# Patient Record
Sex: FEMALE | Race: WHITE | NOT HISPANIC OR LATINO | Employment: UNEMPLOYED | URBAN - METROPOLITAN AREA
[De-identification: names, ages, dates, MRNs, and addresses within clinical notes are randomized per-mention and may not be internally consistent; named-entity substitution may affect disease eponyms.]

---

## 2018-09-13 ENCOUNTER — APPOINTMENT (EMERGENCY)
Dept: RADIOLOGY | Facility: HOSPITAL | Age: 53
End: 2018-09-13
Payer: COMMERCIAL

## 2018-09-13 ENCOUNTER — HOSPITAL ENCOUNTER (EMERGENCY)
Facility: HOSPITAL | Age: 53
Discharge: HOME | End: 2018-09-13
Attending: EMERGENCY MEDICINE | Admitting: EMERGENCY MEDICINE
Payer: COMMERCIAL

## 2018-09-13 VITALS
RESPIRATION RATE: 18 BRPM | WEIGHT: 178 LBS | SYSTOLIC BLOOD PRESSURE: 142 MMHG | DIASTOLIC BLOOD PRESSURE: 81 MMHG | HEIGHT: 61 IN | OXYGEN SATURATION: 100 % | TEMPERATURE: 98.6 F | BODY MASS INDEX: 33.61 KG/M2 | HEART RATE: 84 BPM

## 2018-09-13 DIAGNOSIS — M71.50 TRAUMATIC BURSITIS: Primary | ICD-10-CM

## 2018-09-13 PROCEDURE — 99283 EMERGENCY DEPT VISIT LOW MDM: CPT

## 2018-09-13 PROCEDURE — 96372 THER/PROPH/DIAG INJ SC/IM: CPT

## 2018-09-13 PROCEDURE — 73564 X-RAY EXAM KNEE 4 OR MORE: CPT

## 2018-09-13 RX ORDER — KETOROLAC TROMETHAMINE 30 MG/ML
30 INJECTION, SOLUTION INTRAMUSCULAR; INTRAVENOUS ONCE
Status: COMPLETED | OUTPATIENT
Start: 2018-09-13 | End: 2018-09-13

## 2018-09-13 RX ADMIN — KETOROLAC TROMETHAMINE 30 MG: 30 INJECTION, SOLUTION INTRAMUSCULAR at 12:55

## 2018-09-13 NOTE — DISCHARGE INSTRUCTIONS
Knee Bursitis   WHAT YOU NEED TO KNOW:   Knee bursitis is inflammation of the bursa in your knee  The bursa is a fluid-filled sac that acts as a cushion between a bone and a tendon  A tendon is a cord of strong tissue that connects muscles to bones  DISCHARGE INSTRUCTIONS:   Medicines:   · NSAIDs:  These medicines decrease swelling, pain, and fever  NSAIDs are available without a doctor's order  Ask your healthcare provider which medicine is right for you  Ask how much to take and when to take it  Take as directed  NSAIDs can cause stomach bleeding and kidney problems if not taken correctly  · Antibiotics: These help fight an infection caused by bacteria  You may need antibiotics if your bursitis is caused by infection  · Take your medicine as directed  Contact your healthcare provider if you think your medicine is not helping or if you have side effects  Tell him of her if you are allergic to any medicine  Keep a list of the medicines, vitamins, and herbs you take  Include the amounts, and when and why you take them  Bring the list or the pill bottles to follow-up visits  Carry your medicine list with you in case of an emergency  Manage your symptoms:   · Rest:  Rest your knee as much as possible to decrease pain and swelling  Slowly start to do more each day  Return to your daily activities as directed  · Ice:  Ice helps decrease swelling and pain  Ice may also help prevent tissue damage  Use an ice pack, or put crushed ice in a plastic bag  Cover it with a towel and place it on your knee for 15 to 20 minutes, 3 to 4 times each day, as directed  · Heat:  Heat helps decrease pain and stiffness  Apply heat on the area for 15 to 20 minutes, 3 to 4 times each day, as directed  · Compression:  Healthcare providers may wrap your knee with tape or an elastic bandage to decrease swelling  Loosen the elastic bandage if you start to lose feeling in your toes      · Elevation:  Raise your knee above the level of your heart as often as you can  This will help decrease swelling and pain  Prop your knee on pillows or blankets to keep it elevated comfortably  Physical therapy:  A physical therapist teaches you exercises to help improve movement and strength, and to decrease pain  Prevent another knee injury:   · Stretch, warm up, and cool down:  Always stretch and do warmup and cool-down exercises before and after you exercise  This will help loosen your muscles and decrease stress on your knees  Rest between workouts  · Protect your knees:  Use kneepads when you kneel on a hard surface and when you play sports  Stand and walk around every 20 minutes if you have to kneel for a long period of time  Follow up with your healthcare provider as directed:  Write down your questions so you remember to ask them during your visits  Contact your healthcare provider if:   · Your pain and swelling increase  · Your symptoms do not improve with treatment  · You have a fever  · You have questions or concerns about your condition or care  © 2017 [facility] Information is for End User's use only and may not be sold, redistributed or otherwise used for commercial purposes  All illustrations and images included in CareNotes® are the copyrighted property of A D A M , Inc  or Barry Hinojosa  The above information is an  only  It is not intended as medical advice for individual conditions or treatments  Talk to your doctor, nurse or pharmacist before following any medical regimen to see if it is safe and effective for you

## 2018-09-13 NOTE — ED PROVIDER NOTES
History  Chief Complaint   Patient presents with    Knee Injury     pt states she slipped on friday and injured her L knee  77-year-old female presents to the emergency department with complaints of left-sided knee pain  States that 6 days ago she was pushing her car to the side of the road when she slipped and fell onto the left knee  States she has had persistent pain in the front of the knee since that time which is worse with ambulation or when going up steps  Denies feeling of instability in this knee  Taking naproxen as needed for pain with minimal relief of symptoms  Patient states she has not had any previous injury to this knee  History provided by:  Patient   used: No        None       Past Medical History:   Diagnosis Date    Fibromyalgia     Medical history unknown        History reviewed  No pertinent surgical history  History reviewed  No pertinent family history  I have reviewed and agree with the history as documented  Social History   Substance Use Topics    Smoking status: Never Smoker    Smokeless tobacco: Never Used    Alcohol use No        Review of Systems   Constitutional: Negative for chills and fever  HENT: Negative for congestion, dental problem and sore throat  Respiratory: Negative for cough  Cardiovascular: Negative for chest pain  Gastrointestinal: Negative for abdominal pain  Musculoskeletal: Negative for back pain and neck pain  Knee pain     Skin: Negative for rash and wound  All other systems reviewed and are negative  Physical Exam  Physical Exam   Constitutional: She is oriented to person, place, and time  Vital signs are normal  She appears well-developed and well-nourished  HENT:   Head: Normocephalic and atraumatic  Cardiovascular: Normal rate and regular rhythm  Pulmonary/Chest: Effort normal and breath sounds normal  No respiratory distress  She has no wheezes  She has no rhonchi   She has no rales    Musculoskeletal:        Left knee: She exhibits swelling  She exhibits normal range of motion, no effusion, no ecchymosis, no deformity, no laceration, no erythema, normal alignment, no LCL laxity and normal patellar mobility  Tenderness (anteriorly) found  Neurological: She is alert and oriented to person, place, and time  Skin: Skin is warm and dry  Psychiatric: She has a normal mood and affect  Her behavior is normal    Nursing note and vitals reviewed  Vital Signs  ED Triage Vitals [09/13/18 1233]   Temperature Pulse Respirations Blood Pressure SpO2   98 6 °F (37 °C) 84 18 142/81 100 %      Temp Source Heart Rate Source Patient Position - Orthostatic VS BP Location FiO2 (%)   Oral Monitor Lying -- --      Pain Score       6           Vitals:    09/13/18 1233   BP: 142/81   Pulse: 84   Patient Position - Orthostatic VS: Lying       Visual Acuity      ED Medications  Medications   ketorolac (TORADOL) injection 30 mg (30 mg Intramuscular Given 9/13/18 1255)       Diagnostic Studies  Results Reviewed     None                 XR knee 4+ views left injury   ED Interpretation by Mason Tesfaye PA-C (09/13 1308)   Normal x-ray of the left knee                 Procedures  Procedures       Phone Contacts  ED Phone Contact    ED Course                               MDM  Number of Diagnoses or Management Options  Diagnosis management comments: Differential diagnosis includes but not limited to:  Contusion, traumatic bursitis, tendonitis  Doubt fracture         Amount and/or Complexity of Data Reviewed  Tests in the radiology section of CPT®: ordered      CritCare Time    Disposition  Final diagnoses:   Traumatic bursitis     Time reflects when diagnosis was documented in both MDM as applicable and the Disposition within this note     Time User Action Codes Description Comment    9/13/2018  1:10 PM Naeem Lucio Add [M71 50] Traumatic bursitis       ED Disposition     ED Disposition Condition Comment    Discharge  Kelton Felton discharge to home/self care  Condition at discharge: Stable        Follow-up Information     Follow up With Specialties Details Why [address] [place] [address] [facility] Schedule an appointment as soon as possible for a visit If symptoms worsen Mans[facility] [address] [place] [address] [id number] [phone]          Patient's Medications    No medications on file     No discharge procedures on file      ED Provider  Electronically Signed by           Sharon Agarwal PA-C  09/13/18 5708

## 2019-08-19 ENCOUNTER — TRANSCRIBE ORDERS (OUTPATIENT)
Dept: ADMINISTRATIVE | Facility: HOSPITAL | Age: 54
End: 2019-08-19

## 2019-08-19 DIAGNOSIS — Z12.39 BREAST SCREENING, UNSPECIFIED: Primary | ICD-10-CM

## 2019-08-19 DIAGNOSIS — Z13.820 SPECIAL SCREENING FOR OSTEOPOROSIS: ICD-10-CM

## 2019-09-24 ENCOUNTER — HOSPITAL ENCOUNTER (OUTPATIENT)
Dept: RADIOLOGY | Facility: MEDICAL CENTER | Age: 54
Discharge: HOME | End: 2019-09-24
Payer: COMMERCIAL

## 2019-09-24 VITALS — WEIGHT: 178 LBS | BODY MASS INDEX: 33.61 KG/M2 | HEIGHT: 61 IN

## 2019-09-24 DIAGNOSIS — Z13.820 SPECIAL SCREENING FOR OSTEOPOROSIS: ICD-10-CM

## 2019-09-24 DIAGNOSIS — Z12.39 BREAST SCREENING, UNSPECIFIED: ICD-10-CM

## 2019-09-24 PROCEDURE — 77080 DXA BONE DENSITY AXIAL: CPT

## 2019-09-24 PROCEDURE — 77067 SCR MAMMO BI INCL CAD: CPT

## 2019-10-04 ENCOUNTER — APPOINTMENT (OUTPATIENT)
Dept: RADIOLOGY | Facility: CLINIC | Age: 54
End: 2019-10-04
Payer: COMMERCIAL

## 2019-10-04 ENCOUNTER — OFFICE VISIT (OUTPATIENT)
Dept: URGENT CARE | Facility: CLINIC | Age: 54
End: 2019-10-04
Payer: COMMERCIAL

## 2019-10-04 VITALS
BODY MASS INDEX: 37.34 KG/M2 | HEART RATE: 69 BPM | DIASTOLIC BLOOD PRESSURE: 73 MMHG | HEIGHT: 61 IN | TEMPERATURE: 98.2 F | SYSTOLIC BLOOD PRESSURE: 173 MMHG | RESPIRATION RATE: 16 BRPM | WEIGHT: 197.8 LBS | OXYGEN SATURATION: 96 %

## 2019-10-04 DIAGNOSIS — G89.29 CHRONIC PAIN OF RIGHT ANKLE: Primary | ICD-10-CM

## 2019-10-04 DIAGNOSIS — M79.671 CHRONIC FOOT PAIN, RIGHT: ICD-10-CM

## 2019-10-04 DIAGNOSIS — G89.29 CHRONIC FOOT PAIN, RIGHT: ICD-10-CM

## 2019-10-04 DIAGNOSIS — M25.571 CHRONIC PAIN OF RIGHT ANKLE: Primary | ICD-10-CM

## 2019-10-04 PROCEDURE — 73630 X-RAY EXAM OF FOOT: CPT

## 2019-10-04 PROCEDURE — 99213 OFFICE O/P EST LOW 20 MIN: CPT | Performed by: PHYSICIAN ASSISTANT

## 2019-10-04 PROCEDURE — 73610 X-RAY EXAM OF ANKLE: CPT

## 2019-10-04 RX ORDER — IBUPROFEN 600 MG/1
600 TABLET ORAL EVERY 8 HOURS SCHEDULED
Qty: 30 TABLET | Refills: 0 | Status: SHIPPED | OUTPATIENT
Start: 2019-10-04 | End: 2020-04-22

## 2019-10-04 NOTE — PROGRESS NOTES
[facility]        NAME: Skinny Vidal is a 47 y o  female  : 1965    MRN: [id number]  DATE: 2019  TIME: 10:53 AM    Assessment and Plan   Chronic pain of right ankle [M25 571, G89 29]  1  Chronic pain of right ankle  XR ankle 3+ vw right    ibuprofen (MOTRIN) 600 mg tablet    Ambulatory referral to Physical Therapy    Ambulatory referral to Orthopedic Surgery   2  Chronic foot pain, right  XR foot 3+ vw right    ibuprofen (MOTRIN) 600 mg tablet    Ambulatory referral to Physical Therapy    Ambulatory referral to Orthopedic Surgery         Patient Instructions   X-ray of right foot and ankle performed in office-  Official radiology report pending at this time  If there are any positive findings our office will contact you  Alternate ice and heat therapy  Recommend a trial of scheduled high-dose anti inflammatory medications to decrease inflammation-prescription sent to the pharmacy for ibuprofen 600 mg  Wear supportive shoes  Consultation placed for Orthopedics and Physical therapy  Follow up with PCP in 3-5 days  Proceed to  ER if symptoms worsen  Chief Complaint     Chief Complaint   Patient presents with    Ankle Pain     Pt reports that she twisted her ankle approx a month ago and has only had it wrapped but pt reports its not any better         History of Present Illness   The patient is a 66-year-old female who presents with right foot and ankle pain that has been present for approximately 1 month  She cannot recall a specific event or injury  She states that she may have been walking and rolled her ankle   She denies any prior right ankle/foot injury or surgeries  She did follow up with her primary care physician and was told to apply an Ace wrap  Since then the pain has not improved and she now has increased pain in her foot  There is no swelling or bruising  Negative wound or abrasion    The pain is primarily located on the outer aspect of the ankle as well as the lateral aspect of the foot  She has been taking Motrin as needed for the pain but not scheduled  HPI    Review of Systems   Review of Systems   Constitutional: Negative for chills and fever  Musculoskeletal:        Right foot and ankle pain  Skin: Negative for color change, pallor, rash and wound  All other systems reviewed and are negative  Current Medications       Current Outpatient Medications:     ibuprofen (MOTRIN) 600 mg tablet, Take 1 tablet (600 mg total) by mouth every 8 (eight) hours, Disp: 30 tablet, Rfl: 0    Current Allergies     Allergies as of 10/04/2019    (No Known Allergies)            The following portions of the patient's history were reviewed and updated as appropriate: allergies, current medications, past family history, past medical history, past social history, past surgical history and problem list      Past Medical History:   Diagnosis Date    Fibromyalgia     Medical history unknown     Osteopenia        Past Surgical History:   Procedure Laterality Date    REDUCTION MAMMAPLASTY Bilateral        Family History   Problem Relation Age of Onset    Ovarian cancer Mother     Colon cancer Father     No Known Problems Maternal Grandmother     No Known Problems Maternal Grandfather     No Known Problems Paternal Grandmother     No Known Problems Paternal Grandfather     No Known Problems Son     Leukemia Maternal Uncle          Medications have been verified  Objective   BP (!) 173/73   Pulse 69   Temp 98 2 °F (36 8 °C)   Resp 16   Ht 5' 1" (1 549 m)   Wt 89 7 kg (197 lb 12 8 oz)   SpO2 96%   BMI 37 37 kg/m²        Physical Exam     Physical Exam   Constitutional: She appears well-developed and well-nourished  No distress  HENT:   Head: Normocephalic and atraumatic  Musculoskeletal:        Right ankle: She exhibits normal range of motion, no swelling, no ecchymosis, no deformity, no laceration and normal pulse  Tenderness   Lateral malleolus tenderness found  No medial malleolus, no AITFL, no CF ligament, no posterior TFL, no head of 5th metatarsal and no proximal fibula tenderness found  Achilles tendon normal  Achilles tendon exhibits no pain, no defect and normal Garcia's test results  Right foot: There is tenderness  There is normal range of motion, no bony tenderness, no swelling, normal capillary refill, no crepitus, no deformity and no laceration  Feet:    Skin: She is not diaphoretic  Nursing note and vitals reviewed

## 2019-10-04 NOTE — PATIENT INSTRUCTIONS
X-ray of right foot and ankle performed in office-  Official radiology report pending at this time  If there are any positive findings our office will contact you  Alternate ice and heat therapy  Recommend a trial of scheduled high-dose anti inflammatory medications to decrease inflammation-prescription sent to the pharmacy for ibuprofen 600 mg  Wear supportive shoes  Consultation placed for Orthopedics and Physical therapy  Follow up with PCP in 3-5 days  Proceed to  ER if symptoms worsen

## 2019-10-05 ENCOUNTER — DOCUMENTATION (OUTPATIENT)
Dept: URGENT CARE | Facility: CLINIC | Age: 54
End: 2019-10-05

## 2019-10-07 ENCOUNTER — OFFICE VISIT (OUTPATIENT)
Dept: PHYSICAL THERAPY | Facility: CLINIC | Age: 54
End: 2019-10-07
Payer: COMMERCIAL

## 2019-10-07 DIAGNOSIS — M25.571 PAIN IN JOINT, ANKLE AND FOOT, RIGHT: ICD-10-CM

## 2019-10-07 PROCEDURE — 97140 MANUAL THERAPY 1/> REGIONS: CPT | Performed by: PHYSICAL THERAPIST

## 2019-10-07 PROCEDURE — 97110 THERAPEUTIC EXERCISES: CPT | Performed by: PHYSICAL THERAPIST

## 2019-10-07 PROCEDURE — 97162 PT EVAL MOD COMPLEX 30 MIN: CPT | Performed by: PHYSICAL THERAPIST

## 2019-10-07 NOTE — PROGRESS NOTES
PT Evaluation     Today's date: 10/7/2019  Patient name: Donnie Frazier  : 1965  MRN: [id number]  Referring provider: Karishma Still PA-C  Dx:   Encounter Diagnosis     ICD-10-CM    1  Chronic pain of right ankle M25 571 Ambulatory referral to Physical Therapy    G89 29    2  Chronic foot pain, right M79 671 Ambulatory referral to Physical Therapy    G89 29                   Assessment  Assessment details: Donnie Frazier is a 47 y o  female was evaluated on 10/7/2019 under Direct Access for signs and symptoms consistent with Pain in joint, ankle and foot, right  Donnie Frazier has the above listed impairments and will benefit from skilled PT to improve deficits to return to prior level of function  Under direct access, the patient can be treated for 30 days without a prescription from the physician  Please do not hesitate to contact me at [phone]  Thank you for allowing me to participate in Donnie Frazier care  Impairments: abnormal coordination, abnormal gait, abnormal muscle firing, abnormal muscle tone, abnormal or restricted ROM, abnormal movement, activity intolerance, impaired balance, impaired physical strength, lacks appropriate home exercise program, pain with function and weight-bearing intolerance  Barriers to therapy: Shoes that the patient must wear during working hours      Understanding of Dx/Px/POC: good   Prognosis: good    Goals  Impairment Goals 4-6 weeks  - Decrease pain to 5/10  - Improve ankle AROM to equal to the unaffected lower extremity  - Increase ankle strength to 5/5 throughout  - Increase hip strength to 4/5 throughout    Functional Goals 6-8 weeks  - Return to Prior Level of Function  - Increase Functional Status Measure (FOTO) to: 72  - Patient will be independent with HEP  - Patient will be able to walk functionally >1 hour  - Patient will be able to perform sit to stand x10  - Patient will be able to ascend and descend stairs x10        Plan  Patient would benefit from: skilled PT  Referral necessary: No  Planned modality interventions: cryotherapy, electrical stimulation/Russian stimulation, H-Wave, TENS, thermotherapy: hydrocollator packs and unattended electrical stimulation  Planned therapy interventions: abdominal trunk stabilization, activity modification, ADL retraining, balance/weight bearing training, breathing training, body mechanics training, coordination, flexibility, functional ROM exercises, graded exercise, home exercise program, work reintegration, therapeutic training, therapeutic exercise, therapeutic activities, stretching, strengthening, self care, postural training, patient education, neuromuscular re-education, muscle pump exercises, motor coordination training, Longo taping, manual therapy, joint mobilization, IADL retraining, balance and gait training  Frequency: 2x week  Duration in visits: 16  Duration in weeks: 8  Treatment plan discussed with: patient, PTA and referring physician        Subjective Evaluation    Pain  Current pain ratin  At best pain ratin  At worst pain ratin  Location: Right lateral foot pain  Patient Goals  Patient goal: She wants to be able to walk  WORK:  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  Her work is not heavy, she has to  the lids, load them in a box, and then push the box onto the conveyer  She is on her feet for 8 hours with 2 30 min breaks  Wears steel toed shoes  She stands on a foam mat  She works [employer]  HOME LIFE: Lives alone with her dog  She reports that he is a big dog  She reports that she does not have to take him for walks, because he would not be able to make it  She has to mow, and take care of all the house stuff by herself  HOBBIES/EXERCISE: She likes to walk 2 miles a day  Has not been able to do this lately      PAIN LOCATION/DESCRIPTORS:  Patient reports her pain is on the side of her foot   Pain is achy, sharp, intermittent, varies, deep  AGGRAVATING FACTORS:  Sleeping in bed is painful  Heel rise, down stairs, walking, standing in one spot  EASES: meds  LATENCY:  30-45 mins  DAY PATTERN:  Usually pain is worst at night  Or first think in the AM     IMAGING:  X-ray: negative  PLOF:  100%  HISTORY OF CURRENT INJURY:  Patient reports that the pain started 2 weeks ago  Patient reports that she noticed the pain really ramped up last Friday  She does not reports that she can recall a mechanism of injury  She reports that she just woke one morning and can't remember what might have happened  She reports that she was doing yard work the day before  She thinks maybe she might have twisted it and just couldn't remember  She reports that some days it is better than others  Objective     Observations     Right Ankle/Foot   Positive for edema  Active Range of Motion   Left Ankle/Foot   Dorsiflexion (ke): 10 degrees   Plantar flexion: 40 degrees   Inversion: 24 degrees   Eversion: 6 degrees     Right Ankle/Foot   Dorsiflexion (ke): 5 degrees with pain  Plantar flexion: 40 degrees with pain  Inversion: 32 degrees with pain  Eversion: 2 degrees with pain    Joint Play     Right Ankle/Foot  Joints within functional limits are the midfoot and forefoot  Hypomobile in the talocrural joint  Tests     Right Ankle/Foot   Negative for anterior drawer, eversion talar tilt, impingement sign, posterior drawer and syndesmosis external rotation  Additional Tests Details  TTP to 3rd met head dosally, and 5th met head laterally 3/4        Swelling   Left Ankle/Foot   Figure 8: 52 cm    Right Ankle/Foot   Figure 8: 54 cm            Diagnosis: Right ankle/foot pain   Precautions: n/a   Manual Therapy 10/7/19       STM ankle/   lateral foot HJS, PT                                       Exercise Diary         Ankle pumps full range 20x       Kneeling DF stretch [id number]       Standing HR Wobble board balance        Gait mechanics- not walking on the outside of her foot                                                                                                                Taping Star tape               Modalities

## 2019-10-10 ENCOUNTER — OFFICE VISIT (OUTPATIENT)
Dept: PHYSICAL THERAPY | Facility: CLINIC | Age: 54
End: 2019-10-10
Payer: COMMERCIAL

## 2019-10-10 DIAGNOSIS — M25.571 PAIN IN JOINT, ANKLE AND FOOT, RIGHT: Primary | ICD-10-CM

## 2019-10-10 PROCEDURE — 97110 THERAPEUTIC EXERCISES: CPT | Performed by: PHYSICAL THERAPIST

## 2019-10-10 PROCEDURE — 97140 MANUAL THERAPY 1/> REGIONS: CPT | Performed by: PHYSICAL THERAPIST

## 2019-10-10 PROCEDURE — 97112 NEUROMUSCULAR REEDUCATION: CPT | Performed by: PHYSICAL THERAPIST

## 2019-10-10 NOTE — PROGRESS NOTES
Daily Note     Today's date: 10/10/2019  Patient name: Veronica Luna  : 1965  MRN: [id number]  Referring provider: Lucien Hernandez PA-C  Dx:   Encounter Diagnosis     ICD-10-CM    1  Pain in joint, ankle and foot, right M25 571                 Patient showed up without an appointment this visit and was still accommodated  Subjective: Patient states that her foot continues to be very painful  She states that she has noticed her shoes that she has to wear at work seem to be really bothering it  Objective: See treatment diary below      Assessment: Tolerated treatment well  Patient exhibited good technique with therapeutic exercises  Patient was able to perform all exercises noted today without increased pain  She is improving slowly toward long term goals  Advised in modified shoe wear  Taped bottom of foot  Patient presents with signs and symptoms consistent with possible gar's neuroma  Rule out as able  Plan: Continue per plan of care  Diagnosis: Right ankle/foot pain   Precautions: n/a   Manual Therapy 10/7/19 10/10/19      STM ankle/   lateral foot HJS, PT HJS, PT                                      Exercise Diary         Ankle pumps full range 20x 20x      Kneeling DF stretch [id number]      Standing HR  20x      Wobble board balance front to back only  2 mins       Gait mechanics- not walking on the outside of her foot  HJS, PT      BAPs                                                                                                 Pt education  Discussed shoe wear/modifications Discuss cushion       Taping Star tape Star tape over 3-4th met heads              Modalities

## 2019-10-21 ENCOUNTER — APPOINTMENT (OUTPATIENT)
Dept: PHYSICAL THERAPY | Facility: CLINIC | Age: 54
End: 2019-10-21
Payer: COMMERCIAL

## 2019-10-24 ENCOUNTER — APPOINTMENT (OUTPATIENT)
Dept: PHYSICAL THERAPY | Facility: CLINIC | Age: 54
End: 2019-10-24
Payer: COMMERCIAL

## 2019-10-28 ENCOUNTER — APPOINTMENT (OUTPATIENT)
Dept: PHYSICAL THERAPY | Facility: CLINIC | Age: 54
End: 2019-10-28
Payer: COMMERCIAL

## 2019-10-31 ENCOUNTER — APPOINTMENT (OUTPATIENT)
Dept: PHYSICAL THERAPY | Facility: CLINIC | Age: 54
End: 2019-10-31
Payer: COMMERCIAL

## 2020-04-20 ENCOUNTER — TELEPHONE (OUTPATIENT)
Dept: FAMILY MEDICINE CLINIC | Facility: CLINIC | Age: 55
End: 2020-04-20

## 2020-04-22 ENCOUNTER — DOCUMENTATION (OUTPATIENT)
Dept: URGENT CARE | Facility: CLINIC | Age: 55
End: 2020-04-22

## 2020-04-22 ENCOUNTER — TELEMEDICINE (OUTPATIENT)
Dept: FAMILY MEDICINE CLINIC | Facility: CLINIC | Age: 55
End: 2020-04-22
Payer: COMMERCIAL

## 2020-04-22 DIAGNOSIS — Z20.828 EXPOSURE TO SARS-ASSOCIATED CORONAVIRUS: ICD-10-CM

## 2020-04-22 DIAGNOSIS — Z20.828 EXPOSURE TO SARS-ASSOCIATED CORONAVIRUS: Primary | ICD-10-CM

## 2020-04-22 PROCEDURE — 99213 OFFICE O/P EST LOW 20 MIN: CPT | Performed by: NURSE PRACTITIONER

## 2020-04-22 PROCEDURE — 87635 SARS-COV-2 COVID-19 AMP PRB: CPT

## 2020-04-22 RX ORDER — ERGOCALCIFEROL 1.25 MG/1
CAPSULE ORAL
COMMUNITY
Start: 2020-03-26 | End: 2021-01-19 | Stop reason: SDUPTHER

## 2020-04-22 RX ORDER — HYDROXYZINE HYDROCHLORIDE 25 MG/1
25 TABLET, FILM COATED ORAL 3 TIMES DAILY
COMMUNITY
Start: 2020-03-31 | End: 2020-04-22

## 2020-04-22 RX ORDER — TRAMADOL HYDROCHLORIDE 50 MG/1
50 TABLET ORAL 2 TIMES DAILY
COMMUNITY
Start: 2020-04-13 | End: 2020-09-07

## 2020-04-23 ENCOUNTER — TELEMEDICINE (OUTPATIENT)
Dept: FAMILY MEDICINE CLINIC | Facility: CLINIC | Age: 55
End: 2020-04-23
Payer: COMMERCIAL

## 2020-04-23 VITALS — BODY MASS INDEX: 37.19 KG/M2 | WEIGHT: 197 LBS | HEIGHT: 61 IN

## 2020-04-23 DIAGNOSIS — Z20.828 EXPOSURE TO SARS-ASSOCIATED CORONAVIRUS: Primary | ICD-10-CM

## 2020-04-23 PROCEDURE — 99213 OFFICE O/P EST LOW 20 MIN: CPT | Performed by: FAMILY MEDICINE

## 2020-04-23 RX ORDER — AZITHROMYCIN 250 MG/1
TABLET, FILM COATED ORAL
Qty: 6 TABLET | Refills: 0 | Status: SHIPPED | OUTPATIENT
Start: 2020-04-23 | End: 2020-04-28 | Stop reason: ALTCHOICE

## 2020-04-23 RX ORDER — FLUTICASONE PROPIONATE 50 MCG
2 SPRAY, SUSPENSION (ML) NASAL DAILY
Qty: 1 BOTTLE | Refills: 1 | Status: SHIPPED | OUTPATIENT
Start: 2020-04-23 | End: 2020-05-18

## 2020-04-24 ENCOUNTER — TELEPHONE (OUTPATIENT)
Dept: FAMILY MEDICINE CLINIC | Facility: CLINIC | Age: 55
End: 2020-04-24

## 2020-04-24 LAB — SARS-COV-2 RNA SPEC QL NAA+PROBE: DETECTED

## 2020-04-28 ENCOUNTER — TELEMEDICINE (OUTPATIENT)
Dept: FAMILY MEDICINE CLINIC | Facility: CLINIC | Age: 55
End: 2020-04-28
Payer: COMMERCIAL

## 2020-04-28 VITALS — WEIGHT: 197 LBS | BODY MASS INDEX: 37.19 KG/M2 | HEIGHT: 61 IN

## 2020-04-28 DIAGNOSIS — U07.1 COVID-19 VIRUS INFECTION: Primary | ICD-10-CM

## 2020-04-28 PROCEDURE — 99213 OFFICE O/P EST LOW 20 MIN: CPT | Performed by: FAMILY MEDICINE

## 2020-04-28 RX ORDER — HYDROXYZINE HYDROCHLORIDE 25 MG/1
TABLET, FILM COATED ORAL
COMMUNITY
Start: 2020-04-26 | End: 2021-01-18 | Stop reason: ALTCHOICE

## 2020-04-28 RX ORDER — BUDESONIDE AND FORMOTEROL FUMARATE DIHYDRATE 160; 4.5 UG/1; UG/1
2 AEROSOL RESPIRATORY (INHALATION) 2 TIMES DAILY
Qty: 1 INHALER | Refills: 0 | Status: SHIPPED | OUTPATIENT
Start: 2020-04-28 | End: 2020-05-20

## 2020-04-28 RX ORDER — CHOLECALCIFEROL (VITAMIN D3) 125 MCG
1 CAPSULE ORAL DAILY
Qty: 90 CAPSULE | Refills: 1 | Status: SHIPPED | OUTPATIENT
Start: 2020-04-28 | End: 2020-10-16

## 2020-04-29 ENCOUNTER — TELEMEDICINE (OUTPATIENT)
Dept: FAMILY MEDICINE CLINIC | Facility: CLINIC | Age: 55
End: 2020-04-29
Payer: COMMERCIAL

## 2020-04-29 VITALS — HEIGHT: 61 IN | WEIGHT: 197 LBS | BODY MASS INDEX: 37.19 KG/M2

## 2020-04-29 DIAGNOSIS — U07.1 COVID-19 VIRUS INFECTION: Primary | ICD-10-CM

## 2020-04-29 PROBLEM — K63.5 COLON POLYP: Status: ACTIVE | Noted: 2020-04-29

## 2020-04-29 PROBLEM — Z20.828 EXPOSURE TO SARS-ASSOCIATED CORONAVIRUS: Status: RESOLVED | Noted: 2020-04-22 | Resolved: 2020-04-29

## 2020-04-29 PROBLEM — K64.8 INTERNAL HEMORRHOIDS: Status: ACTIVE | Noted: 2020-04-29

## 2020-04-29 PROBLEM — Z80.0 FAMILY HISTORY OF COLORECTAL CANCER: Status: ACTIVE | Noted: 2020-04-29

## 2020-04-29 PROCEDURE — 99213 OFFICE O/P EST LOW 20 MIN: CPT | Performed by: FAMILY MEDICINE

## 2020-04-30 ENCOUNTER — TELEMEDICINE (OUTPATIENT)
Dept: FAMILY MEDICINE CLINIC | Facility: CLINIC | Age: 55
End: 2020-04-30
Payer: COMMERCIAL

## 2020-04-30 VITALS — BODY MASS INDEX: 37.19 KG/M2 | WEIGHT: 197 LBS | HEIGHT: 61 IN

## 2020-04-30 DIAGNOSIS — U07.1 COVID-19 VIRUS INFECTION: Primary | ICD-10-CM

## 2020-04-30 PROCEDURE — 99213 OFFICE O/P EST LOW 20 MIN: CPT | Performed by: FAMILY MEDICINE

## 2020-05-08 ENCOUNTER — TELEMEDICINE (OUTPATIENT)
Dept: FAMILY MEDICINE CLINIC | Facility: CLINIC | Age: 55
End: 2020-05-08
Payer: COMMERCIAL

## 2020-05-08 VITALS — WEIGHT: 197 LBS | BODY MASS INDEX: 37.19 KG/M2 | HEIGHT: 61 IN

## 2020-05-08 DIAGNOSIS — U07.1 COVID-19 VIRUS INFECTION: Primary | ICD-10-CM

## 2020-05-08 PROCEDURE — 99213 OFFICE O/P EST LOW 20 MIN: CPT | Performed by: FAMILY MEDICINE

## 2020-05-08 PROCEDURE — 3008F BODY MASS INDEX DOCD: CPT | Performed by: FAMILY MEDICINE

## 2020-05-15 DIAGNOSIS — Z20.828 EXPOSURE TO SARS-ASSOCIATED CORONAVIRUS: ICD-10-CM

## 2020-05-18 RX ORDER — FLUTICASONE PROPIONATE 50 MCG
SPRAY, SUSPENSION (ML) NASAL
Qty: 16 ML | Refills: 1 | Status: SHIPPED | OUTPATIENT
Start: 2020-05-18 | End: 2020-06-11

## 2020-05-20 DIAGNOSIS — U07.1 COVID-19 VIRUS INFECTION: ICD-10-CM

## 2020-05-20 RX ORDER — BUDESONIDE AND FORMOTEROL FUMARATE DIHYDRATE 160; 4.5 UG/1; UG/1
AEROSOL RESPIRATORY (INHALATION)
Qty: 10.2 INHALER | Refills: 0 | Status: SHIPPED | OUTPATIENT
Start: 2020-05-20 | End: 2021-01-18 | Stop reason: ALTCHOICE

## 2020-06-10 DIAGNOSIS — Z20.828 EXPOSURE TO SARS-ASSOCIATED CORONAVIRUS: ICD-10-CM

## 2020-06-11 RX ORDER — FLUTICASONE PROPIONATE 50 MCG
SPRAY, SUSPENSION (ML) NASAL
Qty: 16 ML | Refills: 1 | Status: SHIPPED | OUTPATIENT
Start: 2020-06-11 | End: 2020-10-19 | Stop reason: ALTCHOICE

## 2020-09-06 DIAGNOSIS — M79.7 FIBROMYALGIA: ICD-10-CM

## 2020-09-07 RX ORDER — TRAMADOL HYDROCHLORIDE 50 MG/1
TABLET ORAL
Qty: 45 TABLET | Refills: 2 | Status: SHIPPED | OUTPATIENT
Start: 2020-09-07 | End: 2021-01-18 | Stop reason: ALTCHOICE

## 2020-10-16 DIAGNOSIS — U07.1 COVID-19 VIRUS INFECTION: ICD-10-CM

## 2020-10-16 RX ORDER — CHOLECALCIFEROL (VITAMIN D3) 125 MCG
CAPSULE ORAL
Qty: 90 CAPSULE | Refills: 0 | Status: SHIPPED | OUTPATIENT
Start: 2020-10-16 | End: 2020-10-19 | Stop reason: ALTCHOICE

## 2020-11-19 ENCOUNTER — TELEPHONE (OUTPATIENT)
Dept: FAMILY MEDICINE CLINIC | Facility: CLINIC | Age: 55
End: 2020-11-19

## 2020-11-19 DIAGNOSIS — F33.1 MODERATE EPISODE OF RECURRENT MAJOR DEPRESSIVE DISORDER (HCC): ICD-10-CM

## 2020-11-19 DIAGNOSIS — Z11.4 SCREENING FOR HIV (HUMAN IMMUNODEFICIENCY VIRUS): ICD-10-CM

## 2020-11-19 DIAGNOSIS — E61.1 IRON DEFICIENCY: ICD-10-CM

## 2020-11-19 DIAGNOSIS — Z11.59 NEED FOR HEPATITIS C SCREENING TEST: ICD-10-CM

## 2020-11-19 DIAGNOSIS — E53.8 B12 DEFICIENCY: ICD-10-CM

## 2020-11-19 DIAGNOSIS — E55.9 VITAMIN D DEFICIENCY: ICD-10-CM

## 2020-11-19 DIAGNOSIS — M79.7 FIBROMYALGIA: Primary | ICD-10-CM

## 2020-11-19 RX ORDER — MILNACIPRAN HYDROCHLORIDE 50 MG/1
50 TABLET, FILM COATED ORAL 2 TIMES DAILY
Qty: 60 TABLET | Refills: 0 | Status: SHIPPED | OUTPATIENT
Start: 2020-11-19 | End: 2020-12-15

## 2020-11-19 RX ORDER — DOXEPIN HYDROCHLORIDE 75 MG/1
CAPSULE ORAL
Qty: 45 CAPSULE | Refills: 0 | Status: SHIPPED | OUTPATIENT
Start: 2020-11-19 | End: 2020-12-08

## 2020-11-19 RX ORDER — PREDNISONE 10 MG/1
TABLET ORAL
COMMUNITY
Start: 2020-10-22 | End: 2021-01-18 | Stop reason: ALTCHOICE

## 2020-12-08 DIAGNOSIS — M79.7 FIBROMYALGIA: ICD-10-CM

## 2020-12-08 DIAGNOSIS — F33.1 MODERATE EPISODE OF RECURRENT MAJOR DEPRESSIVE DISORDER (HCC): ICD-10-CM

## 2020-12-08 RX ORDER — DOXEPIN HYDROCHLORIDE 75 MG/1
CAPSULE ORAL
Qty: 45 CAPSULE | Refills: 0 | Status: SHIPPED | OUTPATIENT
Start: 2020-12-08 | End: 2021-01-06

## 2020-12-15 DIAGNOSIS — M79.7 FIBROMYALGIA: ICD-10-CM

## 2020-12-15 RX ORDER — MILNACIPRAN HYDROCHLORIDE 50 MG/1
TABLET, FILM COATED ORAL
Qty: 180 TABLET | Refills: 1 | Status: SHIPPED | OUTPATIENT
Start: 2020-12-15 | End: 2021-05-18 | Stop reason: ALTCHOICE

## 2021-01-06 DIAGNOSIS — F33.1 MODERATE EPISODE OF RECURRENT MAJOR DEPRESSIVE DISORDER (HCC): ICD-10-CM

## 2021-01-06 DIAGNOSIS — M79.7 FIBROMYALGIA: ICD-10-CM

## 2021-01-06 RX ORDER — DOXEPIN HYDROCHLORIDE 75 MG/1
CAPSULE ORAL
Qty: 90 CAPSULE | Refills: 1 | Status: SHIPPED | OUTPATIENT
Start: 2021-01-06 | End: 2021-03-22 | Stop reason: ALTCHOICE

## 2021-01-18 ENCOUNTER — LAB (OUTPATIENT)
Dept: LAB | Facility: CLINIC | Age: 56
End: 2021-01-18
Payer: COMMERCIAL

## 2021-01-18 DIAGNOSIS — Z11.59 NEED FOR HEPATITIS C SCREENING TEST: ICD-10-CM

## 2021-01-18 DIAGNOSIS — E53.8 B12 DEFICIENCY: ICD-10-CM

## 2021-01-18 DIAGNOSIS — E61.1 IRON DEFICIENCY: ICD-10-CM

## 2021-01-18 DIAGNOSIS — E55.9 VITAMIN D DEFICIENCY: ICD-10-CM

## 2021-01-18 DIAGNOSIS — Z11.4 SCREENING FOR HIV (HUMAN IMMUNODEFICIENCY VIRUS): ICD-10-CM

## 2021-01-18 DIAGNOSIS — M79.7 FIBROMYALGIA: ICD-10-CM

## 2021-01-18 LAB
25(OH)D3 SERPL-MCNC: 28.5 NG/ML (ref 30–100)
ALBUMIN SERPL BCP-MCNC: 3.5 G/DL (ref 3.5–5)
ALP SERPL-CCNC: 76 U/L (ref 46–116)
ALT SERPL W P-5'-P-CCNC: 20 U/L (ref 12–78)
ANION GAP SERPL CALCULATED.3IONS-SCNC: 8 MMOL/L (ref 4–13)
AST SERPL W P-5'-P-CCNC: 18 U/L (ref 5–45)
BASOPHILS # BLD AUTO: 0.04 THOUSANDS/ΜL (ref 0–0.1)
BASOPHILS NFR BLD AUTO: 1 % (ref 0–1)
BILIRUB SERPL-MCNC: 0.26 MG/DL (ref 0.2–1)
BUN SERPL-MCNC: 10 MG/DL (ref 5–25)
CALCIUM SERPL-MCNC: 8.6 MG/DL (ref 8.3–10.1)
CHLORIDE SERPL-SCNC: 103 MMOL/L (ref 100–108)
CO2 SERPL-SCNC: 28 MMOL/L (ref 21–32)
CREAT SERPL-MCNC: 0.71 MG/DL (ref 0.6–1.3)
EOSINOPHIL # BLD AUTO: 0.15 THOUSAND/ΜL (ref 0–0.61)
EOSINOPHIL NFR BLD AUTO: 3 % (ref 0–6)
ERYTHROCYTE [DISTWIDTH] IN BLOOD BY AUTOMATED COUNT: 11.2 % (ref 11.6–15.1)
FERRITIN SERPL-MCNC: 10 NG/ML (ref 8–388)
GFR SERPL CREATININE-BSD FRML MDRD: 96 ML/MIN/1.73SQ M
GLUCOSE P FAST SERPL-MCNC: 96 MG/DL (ref 65–99)
HCT VFR BLD AUTO: 39.3 % (ref 34.8–46.1)
HCV AB SER QL: NORMAL
HGB BLD-MCNC: 12.7 G/DL (ref 11.5–15.4)
IMM GRANULOCYTES # BLD AUTO: 0.01 THOUSAND/UL (ref 0–0.2)
IMM GRANULOCYTES NFR BLD AUTO: 0 % (ref 0–2)
LYMPHOCYTES # BLD AUTO: 1.35 THOUSANDS/ΜL (ref 0.6–4.47)
LYMPHOCYTES NFR BLD AUTO: 25 % (ref 14–44)
MCH RBC QN AUTO: 31 PG (ref 26.8–34.3)
MCHC RBC AUTO-ENTMCNC: 32.3 G/DL (ref 31.4–37.4)
MCV RBC AUTO: 96 FL (ref 82–98)
MONOCYTES # BLD AUTO: 0.5 THOUSAND/ΜL (ref 0.17–1.22)
MONOCYTES NFR BLD AUTO: 9 % (ref 4–12)
NEUTROPHILS # BLD AUTO: 3.43 THOUSANDS/ΜL (ref 1.85–7.62)
NEUTS SEG NFR BLD AUTO: 62 % (ref 43–75)
NRBC BLD AUTO-RTO: 0 /100 WBCS
PLATELET # BLD AUTO: 236 THOUSANDS/UL (ref 149–390)
PMV BLD AUTO: 9.9 FL (ref 8.9–12.7)
POTASSIUM SERPL-SCNC: 4.3 MMOL/L (ref 3.5–5.3)
PROT SERPL-MCNC: 7.4 G/DL (ref 6.4–8.2)
RBC # BLD AUTO: 4.1 MILLION/UL (ref 3.81–5.12)
SODIUM SERPL-SCNC: 139 MMOL/L (ref 136–145)
VIT B12 SERPL-MCNC: 316 PG/ML (ref 100–900)
WBC # BLD AUTO: 5.48 THOUSAND/UL (ref 4.31–10.16)

## 2021-01-18 PROCEDURE — 87389 HIV-1 AG W/HIV-1&-2 AB AG IA: CPT

## 2021-01-18 PROCEDURE — 82728 ASSAY OF FERRITIN: CPT

## 2021-01-18 PROCEDURE — 86803 HEPATITIS C AB TEST: CPT

## 2021-01-18 PROCEDURE — 36415 COLL VENOUS BLD VENIPUNCTURE: CPT

## 2021-01-18 PROCEDURE — 85025 COMPLETE CBC W/AUTO DIFF WBC: CPT

## 2021-01-18 PROCEDURE — 80053 COMPREHEN METABOLIC PANEL: CPT

## 2021-01-18 PROCEDURE — 82306 VITAMIN D 25 HYDROXY: CPT

## 2021-01-18 PROCEDURE — 82607 VITAMIN B-12: CPT

## 2021-01-19 ENCOUNTER — OFFICE VISIT (OUTPATIENT)
Dept: FAMILY MEDICINE CLINIC | Facility: CLINIC | Age: 56
End: 2021-01-19
Payer: COMMERCIAL

## 2021-01-19 VITALS
SYSTOLIC BLOOD PRESSURE: 122 MMHG | RESPIRATION RATE: 16 BRPM | BODY MASS INDEX: 36.44 KG/M2 | HEIGHT: 61 IN | WEIGHT: 193 LBS | DIASTOLIC BLOOD PRESSURE: 84 MMHG | HEART RATE: 86 BPM | OXYGEN SATURATION: 98 %

## 2021-01-19 DIAGNOSIS — E55.9 VITAMIN D DEFICIENCY: Primary | ICD-10-CM

## 2021-01-19 DIAGNOSIS — E61.1 IRON DEFICIENCY: ICD-10-CM

## 2021-01-19 DIAGNOSIS — Z12.31 ENCOUNTER FOR SCREENING MAMMOGRAM FOR BREAST CANCER: ICD-10-CM

## 2021-01-19 DIAGNOSIS — E53.8 B12 DEFICIENCY: ICD-10-CM

## 2021-01-19 DIAGNOSIS — M54.42 ACUTE LEFT-SIDED LOW BACK PAIN WITH LEFT-SIDED SCIATICA: ICD-10-CM

## 2021-01-19 DIAGNOSIS — Z00.00 WELL ADULT EXAM: ICD-10-CM

## 2021-01-19 DIAGNOSIS — Z23 NEED FOR VACCINATION: ICD-10-CM

## 2021-01-19 DIAGNOSIS — E66.01 CLASS 2 SEVERE OBESITY DUE TO EXCESS CALORIES WITH SERIOUS COMORBIDITY AND BODY MASS INDEX (BMI) OF 36.0 TO 36.9 IN ADULT (HCC): ICD-10-CM

## 2021-01-19 LAB — HIV 1+2 AB+HIV1 P24 AG SERPL QL IA: NORMAL

## 2021-01-19 PROCEDURE — 90471 IMMUNIZATION ADMIN: CPT | Performed by: FAMILY MEDICINE

## 2021-01-19 PROCEDURE — 3008F BODY MASS INDEX DOCD: CPT | Performed by: FAMILY MEDICINE

## 2021-01-19 PROCEDURE — 99396 PREV VISIT EST AGE 40-64: CPT | Performed by: FAMILY MEDICINE

## 2021-01-19 PROCEDURE — 3725F SCREEN DEPRESSION PERFORMED: CPT | Performed by: FAMILY MEDICINE

## 2021-01-19 PROCEDURE — 1036F TOBACCO NON-USER: CPT | Performed by: FAMILY MEDICINE

## 2021-01-19 PROCEDURE — 90682 RIV4 VACC RECOMBINANT DNA IM: CPT | Performed by: FAMILY MEDICINE

## 2021-01-19 RX ORDER — ERGOCALCIFEROL 1.25 MG/1
50000 CAPSULE ORAL WEEKLY
Qty: 12 CAPSULE | Refills: 0 | Status: SHIPPED | OUTPATIENT
Start: 2021-01-19 | End: 2021-04-09

## 2021-01-19 RX ORDER — CYANOCOBALAMIN (VITAMIN B-12) 500 MCG
1000 TABLET ORAL DAILY
Qty: 90 TABLET | Refills: 1 | Status: SHIPPED | OUTPATIENT
Start: 2021-01-19 | End: 2021-03-17

## 2021-01-19 RX ORDER — TIZANIDINE 2 MG/1
2 TABLET ORAL
Qty: 30 TABLET | Refills: 0 | Status: SHIPPED | OUTPATIENT
Start: 2021-01-19 | End: 2021-02-15

## 2021-01-19 NOTE — PROGRESS NOTES
Assessment/Plan:    1  Vitamin D deficiency  -     ergocalciferol (VITAMIN D2) 50,000 units; Take 1 capsule (50,000 Units total) by mouth once a week    2  Iron deficiency  -     Ambulatory referral to Hematology / Oncology; Future    3  Need for vaccination  -     influenza vaccine, quadrivalent, recombinant, PF, 0 5 mL, for patients 18 yr+ (FLUBLOK)    4  Well adult exam    5  Encounter for screening mammogram for breast cancer  -     Ambulatory referral to Gynecology; Future  -     Mammo screening bilateral w 3d & cad; Future; Expected date: 01/19/2021    6  B12 deficiency  -     vitamin B-12 (CYANOCOBALAMIN) 500 MCG TABS; Take 2 tablets (1,000 mcg total) by mouth daily    7  Acute left-sided low back pain with left-sided sciatica  -     XR spine lumbar minimum 4 views non injury; Future; Expected date: 01/19/2021  -     tiZANidine (ZANAFLEX) 2 mg tablet; Take 1 tablet (2 mg total) by mouth daily at bedtime    8  Class 2 severe obesity due to excess calories with serious comorbidity and body mass index (BMI) of 36 0 to 36 9 in adult ([facility] [address] )       Vit d 50K   b12 1000  Send to hematology for iron ifusion   Has had it in [place] past and felt much better with it   feritin 10   Also saella increase to 100 bid   And look at the [facility] for swimming     coivd is finsihed       Subjective:      Patient ID: Bhavin Smallwood is a 54 y o  female  HPI     Lab review   covid is finsihed   Fibro is eh savalla didn't work much   Did well with water ex  Doxepin at night for sleeping   No major wt chagnes     Left sided sciatca off and on for months but worsening over past couple weeks   No injury     The following portions of the patient's history were reviewed and updated as appropriate: allergies, current medications, past family history, past medical history, past social history, past surgical history and problem list     Review of Systems   Constitutional: Negative for fever and unexpected weight change     HENT: Negative for nosebleeds and trouble swallowing  Eyes: Negative for visual disturbance  Respiratory: Negative for chest tightness and shortness of breath  Cardiovascular: Negative for chest pain, palpitations and leg swelling  Gastrointestinal: Negative for abdominal pain, constipation, diarrhea and nausea  Endocrine: Negative for cold intolerance  Genitourinary: Negative for dysuria and urgency  Musculoskeletal: Positive for arthralgias, back pain and myalgias  Negative for joint swelling  Skin: Negative for rash  Neurological: Negative for tremors, seizures and syncope  Hematological: Does not bruise/bleed easily  Psychiatric/Behavioral: Negative for hallucinations and suicidal ideas           Objective:      /84 (BP Location: Left arm, Patient Position: Sitting, Cuff Size: Large)   Pulse 86   Resp 16   Ht 5' 1" (1 549 m)   Wt 87 5 kg (193 lb)   SpO2 98%   BMI 36 47 kg/m²     Lab on 01/18/2021   Component Date Value    Vit D, 25-Hydroxy 01/18/2021 28 5*    Ferritin 01/18/2021 10     Sodium 01/18/2021 139     Potassium 01/18/2021 4 3     Chloride 01/18/2021 103     CO2 01/18/2021 28     ANION GAP 01/18/2021 8     BUN 01/18/2021 10     Creatinine 01/18/2021 0 71     Glucose, Fasting 01/18/2021 96     Calcium 01/18/2021 8 6     AST 01/18/2021 18     ALT 01/18/2021 20     Alkaline Phosphatase 01/18/2021 76     Total Protein 01/18/2021 7 4     Albumin 01/18/2021 3 5     Total Bilirubin 01/18/2021 0 26     eGFR 01/18/2021 96     WBC 01/18/2021 5 48     RBC 01/18/2021 4 10     Hemoglobin 01/18/2021 12 7     Hematocrit 01/18/2021 39 3     MCV 01/18/2021 96     MCH 01/18/2021 31 0     MCHC 01/18/2021 32 3     RDW 01/18/2021 11 2*    MPV 01/18/2021 9 9     Platelets 42/62/1761 236     nRBC 01/18/2021 0     Neutrophils Relative 01/18/2021 62     Immat GRANS % 01/18/2021 0     Lymphocytes Relative 01/18/2021 25     Monocytes Relative 01/18/2021 9     Eosinophils Relative 01/18/2021 3     Basophils Relative 01/18/2021 1     Neutrophils Absolute 01/18/2021 3 43     Immature Grans Absolute 01/18/2021 0 01     Lymphocytes Absolute 01/18/2021 1 35     Monocytes Absolute 01/18/2021 0 50     Eosinophils Absolute 01/18/2021 0 15     Basophils Absolute 01/18/2021 0 04     Vitamin B-12 01/18/2021 316     Hepatitis C Ab 01/18/2021 Non-reactive           Physical Exam  Vitals signs and nursing note reviewed  Constitutional:       Appearance: She is well-developed  She is obese  HENT:      Head: Normocephalic and atraumatic  Neck:      Musculoskeletal: Normal range of motion and neck supple  Cardiovascular:      Rate and Rhythm: Normal rate and regular rhythm  Heart sounds: Normal heart sounds  No murmur  Pulmonary:      Effort: Pulmonary effort is normal       Breath sounds: Normal breath sounds  No wheezing or rales  Abdominal:      General: Bowel sounds are normal  There is no distension  Palpations: Abdomen is soft  Tenderness: There is no abdominal tenderness  Musculoskeletal: Normal range of motion  General: Tenderness present  Lymphadenopathy:      Cervical: No cervical adenopathy  Skin:     General: Skin is warm and dry  Capillary Refill: Capillary refill takes less than 2 seconds  Findings: No rash  Neurological:      Mental Status: She is alert and oriented to person, place, and time  Cranial Nerves: No cranial nerve deficit  Sensory: No sensory deficit  Motor: No abnormal muscle tone  Psychiatric:         Behavior: Behavior normal          Thought Content: Thought content normal          Judgment: Judgment normal          BMI Counseling: Body mass index is 36 47 kg/m²  The BMI is above normal  Nutrition recommendations include decreasing portion sizes, encouraging healthy choices of fruits and vegetables and limiting drinks that contain sugar   Exercise recommendations include moderate physical activity 150 minutes/week  No pharmacotherapy was ordered             Berhane Guo MD  [place] [address]

## 2021-01-21 ENCOUNTER — TELEPHONE (OUTPATIENT)
Dept: HEMATOLOGY ONCOLOGY | Facility: CLINIC | Age: 56
End: 2021-01-21

## 2021-01-21 NOTE — TELEPHONE ENCOUNTER
New Patient Encounter    New Patient Intake Form   Patient Details:  Patt Rubi  1965  [id number]    Background Information:  [address] starts by opening a telephone encounter and gathering the following information   Who is calling to schedule? If not self, relationship to patient? self   Referring Provider Julieta Woods   What is the diagnosis? fe def anemia   Is this diagnosis confirmed? Yes   When was the diagnosis? 2 years ago   Is there a confirmed diagnosis from a biopsy/tissue reviewed by pathology? Were outside slides requested? Is patient aware of diagnosis? Yes   Is there a personal history and what kind? Is there a family history and what kind? Reason for visit? History Of   Have you had any imaging or labs done? If so: when, where? yes  [facility]   Are records in EPIC? yes   If patient has a prior history of breast cancer were old records obtained? NA   Was the patient told to bring a disk? Does the patient smoke or Vape? no   If yes, how many packs or cartridges per day? Scheduling Information:   Preferred Dane: Cassandra Bowen     Are there any dates/time the patient cannot be seen? Miscellaneous: pt does not recall name of hematologist she saw at [facility], but she did have iron infusions in her office  She will try to remember who it was and request records be sent to our office   After completing the above information, please route to Financial Counselor and the appropriate Nurse Navigator for review

## 2021-01-25 ENCOUNTER — APPOINTMENT (OUTPATIENT)
Dept: RADIOLOGY | Facility: CLINIC | Age: 56
End: 2021-01-25
Payer: COMMERCIAL

## 2021-01-25 DIAGNOSIS — M54.42 ACUTE LEFT-SIDED LOW BACK PAIN WITH LEFT-SIDED SCIATICA: ICD-10-CM

## 2021-01-25 PROCEDURE — 72110 X-RAY EXAM L-2 SPINE 4/>VWS: CPT

## 2021-01-26 DIAGNOSIS — M54.42 ACUTE LEFT-SIDED LOW BACK PAIN WITH LEFT-SIDED SCIATICA: Primary | ICD-10-CM

## 2021-01-26 RX ORDER — TRAMADOL HYDROCHLORIDE 50 MG/1
50 TABLET ORAL EVERY 6 HOURS
Qty: 20 TABLET | Refills: 2 | Status: SHIPPED | OUTPATIENT
Start: 2021-01-26 | End: 2021-01-31

## 2021-01-29 ENCOUNTER — NURSE TRIAGE (OUTPATIENT)
Dept: PHYSICAL THERAPY | Facility: OTHER | Age: 56
End: 2021-01-29

## 2021-01-29 DIAGNOSIS — M54.50 ACUTE LEFT-SIDED LOW BACK PAIN, UNSPECIFIED WHETHER SCIATICA PRESENT: Primary | ICD-10-CM

## 2021-01-29 NOTE — TELEPHONE ENCOUNTER
Additional Information   Negative: Is this related to a work injury?  Negative: Is this related to an MVA?  Negative: Has the patient had unexplained weight loss?  Negative: Does the patient have a fever?  Negative: Is the patient experiencing blood in sputum?  Negative: Is the patient experiencing urine retention?  Negative: Is the patient experiencing acute drop foot or paralysis?  Negative: Has the patient experienced major trauma? (fall from height, high speed collision, direct blow to spine) and is also experiencing nausea, light-headedness, or loss of consciousness?  Negative: Is this a chronic condition? Background - Initial Assessment  Clinical complaint: left lower back pain which radiates down the left leg down to the foot  No numbness or tingling  No history of back or neck issues  States she also has bilateral upper back pain which is non radiating  No incident or injury noted  Date of onset: 1/15/2021  Frequency of pain: constant  Quality of pain: aching and throbbing at times    Protocols used: [facility] COMPREHENSIVE SPINE PROGRAM PROTOCOL    This RN did review in detail the Comprehensive Spine Program and what we can provide for their back/neck pain  Patient is agreeable to being triaged by this RN and would like to proceed with Physical Therapy  Referral was placed for Physical Therapy at the [facility] site  Patients information was sent to the  to make evaluation appointment  Patient made aware that the PT office  will be calling to schedule the appointment  Patient was provided with the phone number to the PT office  No further questions and/or concerns were voiced by the patient at this time  Patient states understanding of the referral that was placed  Referral Closed

## 2021-02-13 DIAGNOSIS — M54.42 ACUTE LEFT-SIDED LOW BACK PAIN WITH LEFT-SIDED SCIATICA: ICD-10-CM

## 2021-02-15 RX ORDER — TIZANIDINE 2 MG/1
2 TABLET ORAL
Qty: 30 TABLET | Refills: 0 | Status: SHIPPED | OUTPATIENT
Start: 2021-02-15 | End: 2021-03-10

## 2021-02-17 ENCOUNTER — EVALUATION (OUTPATIENT)
Dept: PHYSICAL THERAPY | Facility: CLINIC | Age: 56
End: 2021-02-17
Payer: COMMERCIAL

## 2021-02-17 VITALS — TEMPERATURE: 97.8 F | SYSTOLIC BLOOD PRESSURE: 147 MMHG | HEART RATE: 86 BPM | DIASTOLIC BLOOD PRESSURE: 94 MMHG

## 2021-02-17 DIAGNOSIS — M54.50 ACUTE LEFT-SIDED LOW BACK PAIN, UNSPECIFIED WHETHER SCIATICA PRESENT: Primary | ICD-10-CM

## 2021-02-17 PROCEDURE — 97161 PT EVAL LOW COMPLEX 20 MIN: CPT

## 2021-02-17 NOTE — PROGRESS NOTES
PT Evaluation     Today's date: 2021  Patient name: Masha Steele  : 1965  MRN: [id number]  Referring provider: Todd Zaldivar PT  Dx:   Encounter Diagnosis     ICD-10-CM    1  Acute left-sided low back pain, unspecified whether sciatica present  M54 5 Ambulatory referral to PT spine     PT plan of care cert/re-cert                  Assessment  Assessment details: Pt is a pleasant 54 y o  female who presents to [place] [address] PT with acute onset of L sided low back pain w/ radicular sx into B LE, no specific CHIQUIS  Today, she presents with decreased and painful thoracolumbar ROM and joint mobility, decreased B LE and core strength, poor postural awareness, and high self reports of pain    Functionally, she is limited in her ability to stand and ambulate, negotiate stairs, sleep through the night, perform age appropriate recreation and exercise, and perform normal home duties  She is motivated to improve  Pt will benefit from skilled PT to address the aforementioned deficits and limitations in an effort to maximize pain free functional mobility and overall quality of life  Progress as able with these goals in mind  Impairments: abnormal coordination, abnormal gait, abnormal muscle firing, abnormal muscle tone, abnormal or restricted ROM, abnormal movement, activity intolerance, impaired physical strength and pain with function  Understanding of Dx/Px/POC: good   Prognosis: good    Goals  Short term goals (3 weeks):  1) Pt will improve thoracolumbar mobility deficits by 25% pain free  2) Pt will improve B LE and core strength deficits by 1/3 grade MMT  3) Pt will improve pain at worse to <4/10  4) Pt will initiate and progress HEP w/ special emphasis on functional core control and thoracolumbar mobility  Long term goals (6 weeks)  1) Pt will improve FOTO to at least 60   2) Pt will sleep through the night in positioning of choosing 6/7 nights a week w/o waking due to pain    3) Pt will perform full week of self care and home activities w/ improved thoracolumbar mechanics and pain <3/10 throughout  4) Pt will be independent and compliant w/ HEP in order to maximize functional benefit of skilled PT following d/c          Plan  Plan details: HEP to start: see scanned doc    Patient would benefit from: skilled PT  Planned modality interventions: cryotherapy and thermotherapy: hydrocollator packs  Planned therapy interventions: abdominal trunk stabilization, activity modification, ADL retraining, manual therapy, massage, motor coordination training, neuromuscular re-education, patient education, therapeutic training, therapeutic exercise, therapeutic activities, stretching, strengthening, home exercise program, functional ROM exercises, gait training, balance, balance/weight bearing training and body mechanics training  Frequency: 2x week  Duration in visits: 12  Duration in weeks: 6  Treatment plan discussed with: patient        Subjective Evaluation    History of Present Illness  Mechanism of injury: Pt has had L side low back and posterior hip pain, radiating into B LE L>R for the last 3-4 weeks  Notes that just before this time she had dog who was ill, she was sleeping on the floor with him  Not sure if this contributed to pain as she notes no other specific CHIQUIS  Also gets pain in L side shoulder and back  Hard to get comfortable and sleep at night, has trouble w/ prolonged positioning and prolonged WB  Feels that she cannot stand up straight s/t pain  Main goal is pain relief  Hopeful to be able to walk and move without pain   Functional status below:   Quality of life: good    Pain  Current pain ratin  At best pain ratin  At worst pain rating: 10  Location: L side low back, L side hip, radiating down L LE> R LE   Relieving factors: heat and rest  Aggravating factors: stair climbing, standing, walking, overhead activity, lifting and running  Progression: worsening    Social Support  Steps to enter house: yes  Stairs in house: no   Lives in: [facility]  Lives with: alone    Employment status: not working (used to work for [employer] )  Patient Goals  Patient goals for therapy: increased strength, decreased pain, increased motion and return to sport/leisure activities  Patient goal: be able to walk, ride horses, move without pain! Objective     Concurrent Complaints  Negative for bladder dysfunction, bowel dysfunction and saddle (S4) numbness    Postural Observations  Seated posture: poor  Standing posture: poor  Correction of posture: makes symptoms better        Palpation     Additional Palpation Details  Pt is TTP and has increased tissue density through L side lumbar PS and QL/piri     Neurological Testing     Sensation     Lumbar   Left   Intact: light touch    Right   Intact: light touch    Reflexes   Left   Patellar (L4): normal (2+)  Achilles (S1): normal (2+)    Right   Patellar (L4): normal (2+)  Achilles (S1): normal (2+)    Active Range of Motion     Additional Active Range of Motion Details  Flex: 75% w/ bracing upon standing and mod pain, guarded   Ext: 50% w/ mod pain in L side low back  SB R: 50% w/ min L sided pain  SB L: 25% w/ mod L sided pain  Rot R: 25-50% w/ mod L sided pain  Rot L: 50-75% w/o significant pain    Guarded throughout       Strength/Myotome Testing     Additional Strength Details  B LE grossly 4-/5 w/ guarding and discomfort throughout    B knee flex/ext grossly 4/5     Muscle Activation   Patient unable to activate left transverse abdominals and right transverse abdominals       Additional Muscle Activation Details  Core Strength: 1/5 w/ cueing to maintain proper brace w/ 1+/5 march     Tests     Additional Tests Details  Manual lumbar traction: LAD on R provides relief to L, L side no relief w/ LAD    CORY provides relief w/ pillow under hips     Passive SLR to ~90 deg B w/o significant pain    Standing lumbar ext helps to relieve discomfort     L side lumbar UPAs painful, L side SI TTP, sacral mobs min antalgic (PA)    Ambulation     Ambulation: Level Surfaces     Additional Level Surfaces Ambulation Details  Forward flexed through hips and stiff through tspine and low back, poor WB on L LE, fatigues quickly, mod antalgic during clinical distances     Functional Assessment        Comments  Sit<>stand: UE support on LE w/ R sided WS and mod pain     BW squat: to 50% w/ R sided WS and mod pain    Stairs: TBA    SLS: unable on L LE s/t pain      Flowsheet Rows      Most Recent Value   PT/OT G-Codes   Current Score  39   Projected Score  57   FOTO information reviewed  Yes             Precautions: fibromyalgia, HTN       Date (Visit #) 2/17 (1) IE       Manual              DTM and TPR             Lumbar mobs  tested B            Lumbar traction  trialed B                                          Exercise Diary              Ther Ex        Active w/u             HS/glute/piri/HF stretching B HS and piri tested x30 sec           Mobility (LTR, open books, cat/cow) LTR x 10 B    CORY and PPU to elbow x3-4 mins       Rows/ext        Hip stability                                                Neuro Re Ed        TrA progressions   isos and march x 5-10 each            Bridge progressions  reg w/ TrA brace x 5            Squat progressions             Hip abd walking/monster walking             Balance         Standing and sitting lumbar ext w/ overpressure demo and practice x 7 mins        Review of HEP and POC x 5 mins                       Ther Act             Stairs             Functional Transfers             Functional Lifting                                                                                                                                                           Modalities             heat             Ice              Mechanical Traction

## 2021-02-19 ENCOUNTER — APPOINTMENT (OUTPATIENT)
Dept: PHYSICAL THERAPY | Facility: CLINIC | Age: 56
End: 2021-02-19
Payer: COMMERCIAL

## 2021-02-22 ENCOUNTER — CONSULT (OUTPATIENT)
Dept: HEMATOLOGY ONCOLOGY | Facility: CLINIC | Age: 56
End: 2021-02-22
Payer: COMMERCIAL

## 2021-02-22 VITALS
HEART RATE: 95 BPM | BODY MASS INDEX: 36.35 KG/M2 | TEMPERATURE: 98.3 F | WEIGHT: 192.5 LBS | RESPIRATION RATE: 17 BRPM | SYSTOLIC BLOOD PRESSURE: 140 MMHG | HEIGHT: 61 IN | DIASTOLIC BLOOD PRESSURE: 81 MMHG | OXYGEN SATURATION: 98 %

## 2021-02-22 DIAGNOSIS — Z98.84 H/O GASTRIC BYPASS: Primary | ICD-10-CM

## 2021-02-22 DIAGNOSIS — E53.8 B12 DEFICIENCY: ICD-10-CM

## 2021-02-22 DIAGNOSIS — E61.1 IRON DEFICIENCY: ICD-10-CM

## 2021-02-22 PROCEDURE — 99243 OFF/OP CNSLTJ NEW/EST LOW 30: CPT | Performed by: PHYSICIAN ASSISTANT

## 2021-02-22 RX ORDER — CYANOCOBALAMIN 1000 UG/ML
1000 INJECTION INTRAMUSCULAR; SUBCUTANEOUS ONCE
Status: CANCELLED | OUTPATIENT
Start: 2021-02-25

## 2021-02-22 RX ORDER — SODIUM CHLORIDE 9 MG/ML
20 INJECTION, SOLUTION INTRAVENOUS ONCE
Status: CANCELLED | OUTPATIENT
Start: 2021-02-25

## 2021-02-22 NOTE — PROGRESS NOTES
Oncology Outpatient Consult Note  Magali Sanford 54 y o  female MRN: @ Encounter: [id number]        Date:  2/22/2021      Assessment/ Plan:    1  Iron deficiency anemia and B12 deficiency secondary to decreased absorption  She status post gastric bypass surgery  She has received IV iron in the past with good tolerance  Potential side effects  of IV iron could include but may not be limited to:  change in taste, diarrhea, muscle cramps, nausea or vomiting, pain in the arms or legs, pain or burning sensation in the injection site, allergic reaction  The patient verbalized understanding and wishes to proceed  Will arrange for Venofer 300 milligrams x 6 doses along with B12 1000 micro grams IM x6 doses  We discussed possibility of a maintenance program       She is asked to follow-up in 6 months with repeat labs      Labs and imaging studies are reviewed by ordering provider once results are available  If there are findings that need immediate attention, you will be contacted when results available  Discussing results and the implication on your healthcare is best discussed in person at your follow-up visit  HPI:  Magali Sanford is a 54 y o  seen for initial consultation 2/22/2021 at the referral of Marisol Devine MD regarding iron deficiency  1/18/2021 hemoglobin 12 7, MCV of 96, white blood cell count 5 48,  62% neutrophils, 25% lymphocytes, 9% monocytes, platelets 580  CMP normal     Vitamin B12 316, ferritin of 10  Normal hepatitis C antibody and HIV antigens/ antibody    5/13/20:   Hemoglobin 13 6, MCV 91 6, white blood cell count 5 2, normal differential, platelets 423    She is status post gastric bypass surgery approximately 2006  She underwent upper endoscopy and colonoscopy 12/31/2019 by Dr Nathaly Tobias  The anastomosis was noted to look good  Internal hemorrhoids noted in 1 small polyp was removed    Repeat colonoscopy in 5 years was recommended,    She has received IV iron in the past at [facility] with good tolerance  Cannot recall when her last treatment was but it was many years ago  States she does not like going to doctors    She does have chronic back pain  Spends her free time wood working and being creative    She has been postmenopausal for many years  Menses had always been erratic  She has fatigue  Denies any headaches, dizziness, chest pain or shortness of breath  No palpitations  No GI or  issues  She denies any melena, hematochezia or other sources of blood loss  She does take B12 orally faithfully        Test Results:      Labs:   Lab Results   Component Value Date    HGB 12 7 01/18/2021    HCT 39 3 01/18/2021    MCV 96 01/18/2021     01/18/2021    WBC 5 48 01/18/2021    NRBC 0 01/18/2021     Lab Results   Component Value Date    K 4 3 01/18/2021     01/18/2021    CO2 28 01/18/2021    BUN 10 01/18/2021    CREATININE 0 71 01/18/2021    GLUF 96 01/18/2021    CALCIUM 8 6 01/18/2021    AST 18 01/18/2021    ALT 20 01/18/2021    ALKPHOS 76 01/18/2021    EGFR 96 01/18/2021           Imaging:   Xr Spine Lumbar Minimum 4 Views Non Injury    Result Date: 1/26/2021  Narrative: LUMBAR SPINE INDICATION:   M[age]: Lumbago with sciatica, left side  COMPARISON:  None VIEWS:  XR SPINE LUMBAR MINIMUM 4 VIEWS NON INJURY Images: 5 FINDINGS: There are 5 non rib bearing lumbar vertebral bodies  There is no evidence of acute fracture or destructive osseous lesion  Mild dextroconvex scoliosis  There are degenerative changes in the facet joints of the lower lumbar spine and in the disc spaces of the lower thoracic and upper lumbar spine  The pedicles appear intact  No pars defects  SI joints appear normal  There are nonspecific calcifications in the left flank area not clearly related to the left kidney  Impression: No acute osseous abnormality  Degenerative changes as described  Scoliosis   Nonspecific left flank calcification of uncertain etiology  Workstation performed: [id number]           ROS: As mentioned in HPI & Interval History otherwise 14 point ROS negative  Active Problems:   Patient Active Problem List   Diagnosis    COVID-19 virus infection    Colon polyp    Internal hemorrhoids    Family history of colorectal cancer    Vitamin D deficiency    Iron deficiency       Past Medical History:   Past Medical History:   Diagnosis Date    Fibromyalgia     Medical history unknown     Osteopenia        Surgical History:   Past Surgical History:   Procedure Laterality Date    CARPAL TUNNEL RELEASE Bilateral     21 y o  with left ulnar nerve release   CARPAL TUNNEL RELEASE Bilateral      SECTION      ELBOW SURGERY      Tennis elbow    GASTRIC BYPASS      REDUCTION MAMMAPLASTY Bilateral     WISDOM TOOTH EXTRACTION         Family History:    Family History   Problem Relation Age of Onset    Ovarian cancer Mother     Heart disease Mother     Colon cancer Father     No Known Problems Maternal Grandmother     No Known Problems Maternal Grandfather     Diabetes Paternal Grandmother     No Known Problems Paternal Grandfather     No Known Problems Son     Leukemia Maternal Uncle     Diabetes Paternal Aunt     Diabetes Paternal Uncle        Cancer-related family history includes Colon cancer in her father; Ovarian cancer in her mother  Social History:   Social History     Socioeconomic History    Marital status:       Spouse name: Not on file    Number of children: Not on file    Years of education: Not on file    Highest education level: Not on file   Occupational History    Not on file   Social Needs    Financial resource strain: Not on file    Food insecurity     Worry: Not on file     Inability: Not on file    Transportation needs     Medical: Not on file     Non-medical: Not on file   Tobacco Use    Smoking status: Never Smoker    Smokeless tobacco: Never Used   Substance and Sexual Activity    Alcohol use: No    Drug use: No    Sexual activity: Not on file   Lifestyle    Physical activity     Days per week: Not on file     Minutes per session: Not on file    Stress: Not on file   Relationships    Social connections     Talks on phone: Not on file     Gets together: Not on file     Attends Mandaen service: Not on file     Active member of club or organization: Not on file     Attends meetings of clubs or organizations: Not on file     Relationship status: Not on file    Intimate partner violence     Fear of current or ex partner: Not on file     Emotionally abused: Not on file     Physically abused: Not on file     Forced sexual activity: Not on file   Other Topics Concern    Not on file   Social History Narrative    Not on file       Current Medications:   Current Outpatient Medications   Medication Sig Dispense Refill    doxepin (SINEquan) 75 MG capsule TAKE 1 CAP AT BEDTIME FOR 1 WEEK THEN INCREASE TO 2 CAPSULES AFTER 1 WEEK 90 capsule 1    ergocalciferol (VITAMIN D2) 50,000 units Take 1 capsule (50,000 Units total) by mouth once a week 12 capsule 0    Savella 50 MG TABS TAKE 1 TABLET BY MOUTH TWICE A  tablet 1    tiZANidine (ZANAFLEX) 2 mg tablet TAKE 1 TABLET (2 MG TOTAL) BY MOUTH DAILY AT BEDTIME 30 tablet 0    vitamin B-12 (CYANOCOBALAMIN) 500 MCG TABS Take 2 tablets (1,000 mcg total) by mouth daily 90 tablet 1     No current facility-administered medications for this visit  Allergies: Allergies   Allergen Reactions    Calamine Other (See Comments)         Physical Exam:    There is no height or weight on file to calculate BSA     Ht Readings from Last 3 Encounters:   01/19/21 5' 1" (1 549 m)   05/08/20 5' 1" (1 549 m)   04/30/20 5' 1" (1 549 m)       Wt Readings from Last 3 Encounters:   01/19/21 87 5 kg (193 lb)   05/08/20 89 4 kg (197 lb)   04/30/20 89 4 kg (197 lb)        Temp Readings from Last 3 Encounters:   02/17/21 97 8 °F (36 6 °C)   10/04/19 98 2 °F (36 8 °C)   09/13/18 98 6 °F (37 °C) (Oral)        BP Readings from Last 3 Encounters:   02/17/21 147/94   01/19/21 122/84   10/04/19 (!) 173/73         Pulse Readings from Last 3 Encounters:   02/17/21 86   01/19/21 86   10/04/19 69         Physical Exam    Physical Exam  Vitals signs reviewed  Constitutional:       General: She is not in acute distress  Appearance: She is well-developed  She is not diaphoretic  HENT:      Head: Normocephalic and atraumatic  Eyes:      Conjunctiva/sclera: Conjunctivae normal    Neck:      Musculoskeletal: Normal range of motion and neck supple  Trachea: No tracheal deviation  Cardiovascular:      Rate and Rhythm: Normal rate and regular rhythm  Heart sounds: No murmur  No friction rub  No gallop  Pulmonary:      Effort: Pulmonary effort is normal  No respiratory distress  Breath sounds: Normal breath sounds  No wheezing or rales  Chest:      Chest wall: No tenderness  Abdominal:      General: There is no distension  Palpations: Abdomen is soft  Tenderness: There is no abdominal tenderness  Lymphadenopathy:      Cervical: No cervical adenopathy  Skin:     General: Skin is warm and dry  Coloration: Skin is not pale  Findings: No erythema  Neurological:      Mental Status: She is alert and oriented to person, place, and time  Psychiatric:         Behavior: Behavior normal          Thought Content:  Thought content normal          Judgment: Judgment normal              Emergency Contacts:    Contact,No, [phone],

## 2021-02-24 ENCOUNTER — OFFICE VISIT (OUTPATIENT)
Dept: PHYSICAL THERAPY | Facility: CLINIC | Age: 56
End: 2021-02-24
Payer: COMMERCIAL

## 2021-02-24 DIAGNOSIS — M54.50 ACUTE LEFT-SIDED LOW BACK PAIN, UNSPECIFIED WHETHER SCIATICA PRESENT: Primary | ICD-10-CM

## 2021-02-24 PROCEDURE — 97112 NEUROMUSCULAR REEDUCATION: CPT

## 2021-02-24 PROCEDURE — 97110 THERAPEUTIC EXERCISES: CPT

## 2021-02-24 NOTE — PROGRESS NOTES
Daily Note     Today's date: 2021  Patient name: Priscila López  : 1965  MRN: [id number]  Referring provider: Shellie Noe, PT  Dx:   Encounter Diagnosis     ICD-10-CM    1  Acute left-sided low back pain, unspecified whether sciatica present  M54 5                   Subjective: Pt reports lots of stress in personal life over last week, back has held up pretty well  Has been using different shoes and feels that this has been beneficial        Objective: Excellent ability to brace through core stabilizers w/ minimal cueing, holds contraction well despite fatigue  Assessment: Tolerated treatment well  Patient demonstrated fatigue post treatment and would benefit from continued PT  Excellent tolerance to initial progressions, shows good form and pacing and takes cueing well  Will benefit from progressive strengthening and exercise increases as sx allow  Focus on techniques that can be replicated at home  Plan: Continue per plan of care   sumo squats, stairs, hip abd work       Precautions: fibromyalgia, HTN       Date (Visit #)  (1) IE  (2)       Manual              DTM and TPR             Lumbar mobs  tested B            Lumbar traction  trialed B                                          Exercise Diary              Ther Ex        Active w/u    recumbent pre 6 min lvl 3          HS/glute/piri/HF stretching B HS and piri tested x30 sec HS and piri x3-4 min total          Mobility (LTR, open books, cat/cow) LTR x 10 B    CORY and PPU to elbow x3-4 mins LTR 2x10      Rows/ext  Blue tband sh ext 3x10       Hip stability                                                Neuro Re Ed        TrA progressions   isos and march x 5-10 each   isos to 90/90 single leg ext taps 2x10-15         Bridge progressions  reg w/ TrA brace x 5  2x10 w/ ad sq         Squat progressions    dowel hip hinge x10, reg squat at bar 2x10         Hip abd walking/monster walking             Balance  GTB pallof press 2x10 B, overhead chop x 10-15        Standing and sitting lumbar ext w/ overpressure demo and practice x 7 mins Standing lumbar ext w/ dowel x10-15 w/ demo and practice x2-3 mins        Review of HEP and POC x 5 mins Review and update x 2-3 mins                       Ther Act             Stairs             Functional Transfers             Functional Lifting                                                                                                                                                           Modalities             heat             Ice              Mechanical Traction

## 2021-02-25 ENCOUNTER — APPOINTMENT (OUTPATIENT)
Dept: PHYSICAL THERAPY | Facility: CLINIC | Age: 56
End: 2021-02-25
Payer: COMMERCIAL

## 2021-03-01 ENCOUNTER — OFFICE VISIT (OUTPATIENT)
Dept: PHYSICAL THERAPY | Facility: CLINIC | Age: 56
End: 2021-03-01
Payer: COMMERCIAL

## 2021-03-01 DIAGNOSIS — E53.8 B12 DEFICIENCY: Primary | ICD-10-CM

## 2021-03-01 DIAGNOSIS — M54.50 ACUTE LEFT-SIDED LOW BACK PAIN, UNSPECIFIED WHETHER SCIATICA PRESENT: Primary | ICD-10-CM

## 2021-03-01 DIAGNOSIS — E61.1 IRON DEFICIENCY: ICD-10-CM

## 2021-03-01 PROCEDURE — 97112 NEUROMUSCULAR REEDUCATION: CPT

## 2021-03-01 PROCEDURE — 97110 THERAPEUTIC EXERCISES: CPT

## 2021-03-01 PROCEDURE — 97140 MANUAL THERAPY 1/> REGIONS: CPT

## 2021-03-01 RX ORDER — SODIUM CHLORIDE 9 MG/ML
20 INJECTION, SOLUTION INTRAVENOUS ONCE
Status: CANCELLED | OUTPATIENT
Start: 2021-03-03

## 2021-03-01 RX ORDER — CYANOCOBALAMIN 1000 UG/ML
1000 INJECTION INTRAMUSCULAR; SUBCUTANEOUS ONCE
Status: CANCELLED | OUTPATIENT
Start: 2021-03-03

## 2021-03-01 NOTE — PROGRESS NOTES
Daily Note     Today's date: 3/1/2021  Patient name: Kody Yo  : 1965  MRN: [id number]  Referring provider: Richar Graves, PT  Dx:   Encounter Diagnosis     ICD-10-CM    1  Acute left-sided low back pain, unspecified whether sciatica present  M54 5                   Subjective: Pt reports that she felt good after last session but that she is sore today  Notes that she had busy day walking and being on feet yesterday  Objective: Requires cueing for depth, pacing, and form w/ split squat, sumo squat, and initially w/ hip hinge work  Shows better ability to self correct w/ continued reps and cueing  Assessment: Tolerated treatment well  Patient demonstrated fatigue post treatment and would benefit from continued PT  Pt reports fatiuge but no increase in pain by end of session  Most of her discomfort is localized to L side SI joint, responds well to AP mobs in supine  Attempts at PA mobs are more painful  Added split squat, bridge march, and HF stretching to HEP  Continue to progress intensity of exercise program as sx allow, focus on techniques that can be replicated at home  Plan: Continue per plan of care   stand from ground, prayer or child's pose, self hip mobility        Precautions: fibromyalgia, HTN       Date (Visit #)  (1) IE  (2)  3/1 (3)      Manual              DTM and TPR             Lumbar mobs  tested B     tested       Lumbar traction  trialed B     LAD x2-3 min hold B LE             PA sacral mobs in prone grade III x 1 min              3 x 1 min rounds supine AP L SI joint mobs           Exercise Diary              Ther Ex        Active w/u    recumbent pre 6 min lvl 3   upright pre 6 min lvl 2-3       HS/glute/piri/HF stretching B HS and piri tested x30 sec HS and piri x3-4 min total   B HS and piri  5 mins        Mobility (LTR, open books, cat/cow) LTR x 10 B    CORY and PPU to elbow x3-4 mins LTR 2x10 x10-15     CORY and PPU review x 1 min      Rows/ext Blue tband sh ext 3x10       Hip stability   S/l open books x 10-15 B                                              Neuro Re Ed        TrA progressions   isos and march x 5-10 each   isos to 90/90 single leg ext taps 2x10-15  isos x10-15       Bridge progressions  reg w/ TrA brace x 5  2x10 w/ ad sq  2x10 w/ ad sq, march 2x10-15        Squat progressions    dowel hip hinge x10, reg squat at bar 2x10  dowel hip hinge x 10    Reg squat x 10-15        Hip abd walking/monster walking      SLDL x10-15        Balance  GTB pallof press 2x10 B, overhead chop x 10-15  Review of tband work       Standing and sitting lumbar ext w/ overpressure demo and practice x 7 mins Standing lumbar ext w/ dowel x10-15 w/ demo and practice x2-3 mins  Review       Review of HEP and POC x 5 mins Review and update x 2-3 mins  Review x 2-3 mins                      Ther Act             Stairs             Functional Transfers             Functional Lifting                                                                                                                                                           Modalities             heat             Ice              Mechanical Traction

## 2021-03-03 ENCOUNTER — HOSPITAL ENCOUNTER (OUTPATIENT)
Dept: INFUSION CENTER | Facility: CLINIC | Age: 56
Discharge: HOME | End: 2021-03-03
Payer: COMMERCIAL

## 2021-03-03 VITALS
HEART RATE: 85 BPM | OXYGEN SATURATION: 98 % | DIASTOLIC BLOOD PRESSURE: 73 MMHG | RESPIRATION RATE: 19 BRPM | SYSTOLIC BLOOD PRESSURE: 123 MMHG

## 2021-03-03 DIAGNOSIS — E61.1 IRON DEFICIENCY: ICD-10-CM

## 2021-03-03 DIAGNOSIS — E53.8 B12 DEFICIENCY: Primary | ICD-10-CM

## 2021-03-03 PROCEDURE — 96372 THER/PROPH/DIAG INJ SC/IM: CPT

## 2021-03-03 PROCEDURE — 96365 THER/PROPH/DIAG IV INF INIT: CPT

## 2021-03-03 RX ORDER — CYANOCOBALAMIN 1000 UG/ML
1000 INJECTION INTRAMUSCULAR; SUBCUTANEOUS ONCE
Status: COMPLETED | OUTPATIENT
Start: 2021-03-03 | End: 2021-03-03

## 2021-03-03 RX ORDER — CYANOCOBALAMIN 1000 UG/ML
1000 INJECTION INTRAMUSCULAR; SUBCUTANEOUS ONCE
Status: CANCELLED | OUTPATIENT
Start: 2021-03-12

## 2021-03-03 RX ORDER — SODIUM CHLORIDE 9 MG/ML
20 INJECTION, SOLUTION INTRAVENOUS ONCE
Status: CANCELLED | OUTPATIENT
Start: 2021-03-12

## 2021-03-03 RX ORDER — SODIUM CHLORIDE 9 MG/ML
20 INJECTION, SOLUTION INTRAVENOUS ONCE
Status: COMPLETED | OUTPATIENT
Start: 2021-03-03 | End: 2021-03-03

## 2021-03-03 RX ADMIN — IRON SUCROSE 300 MG: 20 INJECTION, SOLUTION INTRAVENOUS at 09:46

## 2021-03-03 RX ADMIN — SODIUM CHLORIDE 20 ML/HR: 0.9 INJECTION, SOLUTION INTRAVENOUS at 09:39

## 2021-03-03 RX ADMIN — CYANOCOBALAMIN 1000 MCG: 1000 INJECTION, SOLUTION INTRAMUSCULAR; SUBCUTANEOUS at 09:46

## 2021-03-03 NOTE — PROGRESS NOTES
Pt  tolerated treatment without incident, B12 admin  IM in   PATRICK  AVS given  Next appt  confirmed

## 2021-03-04 ENCOUNTER — APPOINTMENT (OUTPATIENT)
Dept: PHYSICAL THERAPY | Facility: CLINIC | Age: 56
End: 2021-03-04
Payer: COMMERCIAL

## 2021-03-10 DIAGNOSIS — M54.42 ACUTE LEFT-SIDED LOW BACK PAIN WITH LEFT-SIDED SCIATICA: ICD-10-CM

## 2021-03-10 RX ORDER — CYANOCOBALAMIN 1000 UG/ML
1000 INJECTION INTRAMUSCULAR; SUBCUTANEOUS ONCE
Status: CANCELLED | OUTPATIENT
Start: 2021-03-12

## 2021-03-10 RX ORDER — TIZANIDINE 2 MG/1
2 TABLET ORAL
Qty: 30 TABLET | Refills: 0 | Status: SHIPPED | OUTPATIENT
Start: 2021-03-10 | End: 2021-03-22

## 2021-03-10 RX ORDER — SODIUM CHLORIDE 9 MG/ML
20 INJECTION, SOLUTION INTRAVENOUS ONCE
Status: CANCELLED | OUTPATIENT
Start: 2021-03-12

## 2021-03-11 ENCOUNTER — APPOINTMENT (OUTPATIENT)
Dept: PHYSICAL THERAPY | Facility: CLINIC | Age: 56
End: 2021-03-11
Payer: COMMERCIAL

## 2021-03-12 ENCOUNTER — HOSPITAL ENCOUNTER (OUTPATIENT)
Dept: INFUSION CENTER | Facility: CLINIC | Age: 56
Discharge: HOME | End: 2021-03-12
Payer: COMMERCIAL

## 2021-03-12 VITALS
HEART RATE: 94 BPM | OXYGEN SATURATION: 98 % | TEMPERATURE: 97.4 F | RESPIRATION RATE: 18 BRPM | SYSTOLIC BLOOD PRESSURE: 162 MMHG | DIASTOLIC BLOOD PRESSURE: 78 MMHG

## 2021-03-12 DIAGNOSIS — E61.1 IRON DEFICIENCY: Primary | ICD-10-CM

## 2021-03-12 DIAGNOSIS — E53.8 B12 DEFICIENCY: ICD-10-CM

## 2021-03-12 PROCEDURE — 96365 THER/PROPH/DIAG IV INF INIT: CPT

## 2021-03-12 PROCEDURE — 96372 THER/PROPH/DIAG INJ SC/IM: CPT

## 2021-03-12 RX ORDER — SODIUM CHLORIDE 9 MG/ML
20 INJECTION, SOLUTION INTRAVENOUS ONCE
Status: COMPLETED | OUTPATIENT
Start: 2021-03-12 | End: 2021-03-12

## 2021-03-12 RX ORDER — CYANOCOBALAMIN 1000 UG/ML
1000 INJECTION INTRAMUSCULAR; SUBCUTANEOUS ONCE
Status: COMPLETED | OUTPATIENT
Start: 2021-03-12 | End: 2021-03-12

## 2021-03-12 RX ORDER — SODIUM CHLORIDE 9 MG/ML
20 INJECTION, SOLUTION INTRAVENOUS ONCE
Status: CANCELLED | OUTPATIENT
Start: 2021-03-19

## 2021-03-12 RX ORDER — CYANOCOBALAMIN 1000 UG/ML
1000 INJECTION INTRAMUSCULAR; SUBCUTANEOUS ONCE
Status: CANCELLED | OUTPATIENT
Start: 2021-03-19

## 2021-03-12 RX ADMIN — IRON SUCROSE 300 MG: 20 INJECTION, SOLUTION INTRAVENOUS at 12:46

## 2021-03-12 RX ADMIN — SODIUM CHLORIDE 20 ML/HR: 0.9 INJECTION, SOLUTION INTRAVENOUS at 12:40

## 2021-03-12 RX ADMIN — CYANOCOBALAMIN 1000 MCG: 1000 INJECTION, SOLUTION INTRAMUSCULAR; SUBCUTANEOUS at 12:49

## 2021-03-12 NOTE — NURSING NOTE
Patient arrives infusion center for Venofer and B12 injection  Patient offers no complaints  B12 injection given to LUE with no issues noted  Patient resting in recliner chair

## 2021-03-12 NOTE — PLAN OF CARE
Problem: Potential for Falls  Goal: Patient will remain free of falls  Description: INTERVENTIONS:  - Assess patient frequently for physical needs  -  Identify cognitive and physical deficits and behaviors that affect risk of falls  -  [place] fall precautions as indicated by assessment   - Educate patient/family on patient safety including physical limitations  - Instruct patient to call for assistance with activity based on assessment  - Modify environment to reduce risk of injury  - Consider OT/PT consult to assist with strengthening/mobility  Outcome: Progressing     Problem: Knowledge Deficit  Goal: Patient/family/caregiver demonstrates understanding of disease process, treatment plan, medications, and discharge instructions  Description: Complete learning assessment and assess knowledge base    Interventions:  - Provide teaching at level of understanding  - Provide teaching via preferred learning methods  Outcome: Progressing

## 2021-03-12 NOTE — NURSING NOTE
Venofer completed with no adverse reactions noted  Patient ate snack prior to discharge  AAox4, ambulating with no gait disturbance noted   Patient aware of next appointment, declined AVS

## 2021-03-15 ENCOUNTER — OFFICE VISIT (OUTPATIENT)
Dept: PHYSICAL THERAPY | Facility: CLINIC | Age: 56
End: 2021-03-15
Payer: COMMERCIAL

## 2021-03-15 DIAGNOSIS — M54.50 ACUTE LEFT-SIDED LOW BACK PAIN, UNSPECIFIED WHETHER SCIATICA PRESENT: Primary | ICD-10-CM

## 2021-03-15 PROCEDURE — 97110 THERAPEUTIC EXERCISES: CPT

## 2021-03-15 PROCEDURE — 97112 NEUROMUSCULAR REEDUCATION: CPT

## 2021-03-15 NOTE — PROGRESS NOTES
Daily Note     Today's date: 3/15/2021  Patient name: Patt Rubi  : 1965  MRN: [id number]  Referring provider: Yasmine Gomes, PT  Dx:   Encounter Diagnosis     ICD-10-CM    1  Acute left-sided low back pain, unspecified whether sciatica present  M54 5                   Subjective: Pt reports that she missed last few visits with a sinus infection  Reports that she felt good after last PT session, fatigued but not in more pain  Has kept up w/ exercises in her time away from PT  Objective: Requires cueing for proper hip abd activation during functional training but is able to correct  Fatigues quickly  Assessment: Tolerated treatment well  Patient demonstrated fatigue post treatment and would benefit from continued PT  Pt reports fatigue but no increase in pain by end of session  Does well w/ restart of exercise program, pt in similar position to where she was at prior to PT layoff  Plan to re-evaluate in coming sessions  Plan: Continue per plan of care   core stab progressions, SLDL and hip abd work       Precautions: fibromyalgia, HTN       Date (Visit #)  (1) IE  (2)  3/1 (3)  3/15 (4)     Manual              DTM and TPR             Lumbar mobs  tested B     tested       Lumbar traction  trialed B     LAD x2-3 min hold B LE  x2 min           PA sacral mobs in prone grade III x 1 min              3 x 1 min rounds supine AP L SI joint mobs           Exercise Diary              Ther Ex        Active w/u    recumbent pre 6 min lvl 3   upright pre 6 min lvl 2-3  x10 min mid session lvl 2-3      HS/glute/piri/HF stretching B HS and piri tested x30 sec HS and piri x3-4 min total   B HS and piri  5 mins   B HS and piri x 5 min total      Mobility (LTR, open books, cat/cow) LTR x 10 B    CORY and PPU to elbow x3-4 mins LTR 2x10 x10-15     CORY and PPU review x 1 min  x10-15 total     Review of prone work     Rows/ext  Blue tband sh ext 3x10   Yellow tubing rows and ext 3x10 each    Hip stability   S/l open books x 10-15 B  Review                                             Neuro Re Ed        TrA progressions   isos and march x 5-10 each   isos to 90/90 single leg ext taps 2x10-15  isos x10-15  isos x 10-15      Bridge progressions  reg w/ TrA brace x 5  2x10 w/ ad sq  2x10 w/ ad sq, march 2x10-15   2x10 w/ ad sq      Squat progressions    dowel hip hinge x10, reg squat at bar 2x10  dowel hip hinge x 10    Reg squat x 10-15   review      Hip abd walking/monster walking      SLDL x10-15        Balance  GTB pallof press 2x10 B, overhead chop x 10-15  Review of tband work  Yellow tubing pallof x 20 B     YTB sh ext w/ march 2x15      Standing and sitting lumbar ext w/ overpressure demo and practice x 7 mins Standing lumbar ext w/ dowel x10-15 w/ demo and practice x2-3 mins  Review  Review      Review of HEP and POC x 5 mins Review and update x 2-3 mins  Review x 2-3 mins  Review and update x 2-3 mins         pball iso press x 10, w/ GTB hip abd and ext 2x10 each             Ther Act             Stairs             Functional Transfers             Functional Lifting                                                                                                                                                           Modalities             heat             Ice              Mechanical Traction

## 2021-03-17 DIAGNOSIS — E53.8 B12 DEFICIENCY: ICD-10-CM

## 2021-03-17 RX ORDER — CHOLECALCIFEROL (VITAMIN D3) 125 MCG
CAPSULE ORAL
Qty: 60 TABLET | Refills: 2 | Status: SHIPPED | OUTPATIENT
Start: 2021-03-17 | End: 2021-05-12

## 2021-03-18 ENCOUNTER — OFFICE VISIT (OUTPATIENT)
Dept: PHYSICAL THERAPY | Facility: CLINIC | Age: 56
End: 2021-03-18
Payer: COMMERCIAL

## 2021-03-18 DIAGNOSIS — M54.50 ACUTE LEFT-SIDED LOW BACK PAIN, UNSPECIFIED WHETHER SCIATICA PRESENT: Primary | ICD-10-CM

## 2021-03-18 PROCEDURE — 97110 THERAPEUTIC EXERCISES: CPT

## 2021-03-18 PROCEDURE — 97112 NEUROMUSCULAR REEDUCATION: CPT

## 2021-03-18 NOTE — PROGRESS NOTES
Daily Note     Today's date: 3/18/2021  Patient name: Aj Reddy  : 1965  MRN: [id number]  Referring provider: Dayna Fish, PT  Dx:   Encounter Diagnosis     ICD-10-CM    1  Acute left-sided low back pain, unspecified whether sciatica present  M54 5                   Subjective: Pt reports that her low back and hips were sore after last session  Was fine immediately after but sat down later in day and was very stiff upon standing  Objective: Requires cueing for proper core bracing during all standing activities, pain slightly decreases w/ proper positioning and weight bearing  Flexion bias today, all ext drills immediately increase sx  Assessment: Tolerated treatment well  Patient demonstrated fatigue post treatment and would benefit from continued PT  Pt shows good ability to correct and brace through core once cued  Asked to consider position and muscle activation during static activities such as standing to do dishes  Shows good tolerance to gentle activation drills, progress as these allow  Plan on re-assessment at next session barring setback  Plan: Continue per plan of care   progress note, washing dishes drills - static posture control      Precautions: fibromyalgia, HTN       Date (Visit #)  (1) IE  (2)  3/1 (3)  3/15 (4)  3/18 (5)   Manual              DTM and TPR             Lumbar mobs  tested B     tested       Lumbar traction  trialed B     LAD x2-3 min hold B LE  x2 min  x2 min B         PA sacral mobs in prone grade III x 1 min    trialed           3 x 1 min rounds supine AP L SI joint mobs           Exercise Diary              Ther Ex        Active w/u    recumbent pre 6 min lvl 3   upright pre 6 min lvl 2-3  x10 min mid session lvl 2-3   pre 10 min lvl 3-4   HS/glute/piri/HF stretching B HS and piri tested x30 sec HS and piri x3-4 min total   B HS and piri  5 mins   B HS and piri x 5 min total   HS and piri w/ intermittent LAD x 5 mins    Mobility (LTR, open books, cat/cow) LTR x 10 B    CORY and PPU to elbow x3-4 mins LTR 2x10 x10-15     CORY and PPU review x 1 min  x10-15 total     Review of prone work  x10-15     CORY x2 mins, PPU x 5 (deferred)    Standing ext deferred    Rows/ext  Blue tband sh ext 3x10   Yellow tubing rows and ext 3x10 each    Hip stability   S/l open books x 10-15 B  Review  Review                                            Neuro Re Ed        TrA progressions   isos and march x 5-10 each   isos to 90/90 single leg ext taps 2x10-15  isos x10-15  isos x 10-15   isos x 10, ad sq x 10   Bridge progressions  reg w/ TrA brace x 5  2x10 w/ ad sq  2x10 w/ ad sq, march 2x10-15   2x10 w/ ad sq   3x10 w/ ad sq   Squat progressions    dowel hip hinge x10, reg squat at bar 2x10  dowel hip hinge x 10    Reg squat x 10-15   review      Hip abd walking/monster walking      SLDL x10-15        Balance  GTB pallof press 2x10 B, overhead chop x 10-15  Review of tband work  Yellow tubing pallof x 20 B     YTB sh ext w/ march 2x15      Standing and sitting lumbar ext w/ overpressure demo and practice x 7 mins Standing lumbar ext w/ dowel x10-15 w/ demo and practice x2-3 mins  Review  Review      Review of HEP and POC x 5 mins Review and update x 2-3 mins  Review x 2-3 mins  Review and update x 2-3 mins         pball iso press x 10, w/ GTB hip abd and ext 2x10 each  pball iso press x20 total, w/ WS demo and mini march x20 - explanation of form x 5 mins         Review of HEP and POC x 3-4 mins   Ther Act             Stairs             Functional Transfers             Functional Lifting                                                                                                                                                           Modalities             heat             Ice              Mechanical Traction

## 2021-03-19 ENCOUNTER — TELEPHONE (OUTPATIENT)
Dept: FAMILY MEDICINE CLINIC | Facility: CLINIC | Age: 56
End: 2021-03-19

## 2021-03-19 ENCOUNTER — HOSPITAL ENCOUNTER (OUTPATIENT)
Dept: INFUSION CENTER | Facility: CLINIC | Age: 56
Discharge: HOME | End: 2021-03-19
Payer: COMMERCIAL

## 2021-03-19 VITALS
HEART RATE: 79 BPM | DIASTOLIC BLOOD PRESSURE: 67 MMHG | SYSTOLIC BLOOD PRESSURE: 129 MMHG | RESPIRATION RATE: 18 BRPM | TEMPERATURE: 94 F | OXYGEN SATURATION: 97 %

## 2021-03-19 DIAGNOSIS — E61.1 IRON DEFICIENCY: Primary | ICD-10-CM

## 2021-03-19 DIAGNOSIS — E53.8 B12 DEFICIENCY: ICD-10-CM

## 2021-03-19 PROCEDURE — 96372 THER/PROPH/DIAG INJ SC/IM: CPT

## 2021-03-19 PROCEDURE — 96365 THER/PROPH/DIAG IV INF INIT: CPT

## 2021-03-19 RX ORDER — SODIUM CHLORIDE 9 MG/ML
20 INJECTION, SOLUTION INTRAVENOUS ONCE
Status: CANCELLED | OUTPATIENT
Start: 2021-03-26

## 2021-03-19 RX ORDER — CYANOCOBALAMIN 1000 UG/ML
1000 INJECTION INTRAMUSCULAR; SUBCUTANEOUS ONCE
Status: CANCELLED | OUTPATIENT
Start: 2021-03-26

## 2021-03-19 RX ORDER — CYANOCOBALAMIN 1000 UG/ML
1000 INJECTION INTRAMUSCULAR; SUBCUTANEOUS ONCE
Status: COMPLETED | OUTPATIENT
Start: 2021-03-19 | End: 2021-03-19

## 2021-03-19 RX ORDER — SODIUM CHLORIDE 9 MG/ML
20 INJECTION, SOLUTION INTRAVENOUS ONCE
Status: COMPLETED | OUTPATIENT
Start: 2021-03-19 | End: 2021-03-19

## 2021-03-19 RX ADMIN — CYANOCOBALAMIN 1000 MCG: 1000 INJECTION, SOLUTION INTRAMUSCULAR; SUBCUTANEOUS at 14:35

## 2021-03-19 RX ADMIN — IRON SUCROSE 300 MG: 20 INJECTION, SOLUTION INTRAVENOUS at 14:35

## 2021-03-19 RX ADMIN — SODIUM CHLORIDE 20 ML/HR: 0.9 INJECTION, SOLUTION INTRAVENOUS at 14:35

## 2021-03-19 NOTE — TELEPHONE ENCOUNTER
SUSY from the [facility] Determination sent to [id number] on 3/19/2021

## 2021-03-22 ENCOUNTER — OFFICE VISIT (OUTPATIENT)
Dept: PHYSICAL THERAPY | Facility: CLINIC | Age: 56
End: 2021-03-22
Payer: COMMERCIAL

## 2021-03-22 ENCOUNTER — OFFICE VISIT (OUTPATIENT)
Dept: FAMILY MEDICINE CLINIC | Facility: CLINIC | Age: 56
End: 2021-03-22
Payer: COMMERCIAL

## 2021-03-22 VITALS
DIASTOLIC BLOOD PRESSURE: 82 MMHG | SYSTOLIC BLOOD PRESSURE: 138 MMHG | HEART RATE: 88 BPM | HEIGHT: 61 IN | OXYGEN SATURATION: 98 % | RESPIRATION RATE: 16 BRPM | BODY MASS INDEX: 36.44 KG/M2 | WEIGHT: 193 LBS

## 2021-03-22 DIAGNOSIS — E66.01 CLASS 2 SEVERE OBESITY DUE TO EXCESS CALORIES WITH SERIOUS COMORBIDITY AND BODY MASS INDEX (BMI) OF 36.0 TO 36.9 IN ADULT (HCC): ICD-10-CM

## 2021-03-22 DIAGNOSIS — E53.8 B12 DEFICIENCY: ICD-10-CM

## 2021-03-22 DIAGNOSIS — M79.7 FIBROMYALGIA: ICD-10-CM

## 2021-03-22 DIAGNOSIS — M54.42 ACUTE LEFT-SIDED LOW BACK PAIN WITH LEFT-SIDED SCIATICA: ICD-10-CM

## 2021-03-22 DIAGNOSIS — M54.50 ACUTE LEFT-SIDED LOW BACK PAIN, UNSPECIFIED WHETHER SCIATICA PRESENT: Primary | ICD-10-CM

## 2021-03-22 DIAGNOSIS — Z98.84 H/O GASTRIC BYPASS: ICD-10-CM

## 2021-03-22 DIAGNOSIS — E55.9 VITAMIN D DEFICIENCY: Primary | ICD-10-CM

## 2021-03-22 DIAGNOSIS — F33.1 MODERATE EPISODE OF RECURRENT MAJOR DEPRESSIVE DISORDER (HCC): ICD-10-CM

## 2021-03-22 DIAGNOSIS — E61.1 IRON DEFICIENCY: ICD-10-CM

## 2021-03-22 PROBLEM — U07.1 COVID-19 VIRUS INFECTION: Status: RESOLVED | Noted: 2020-04-28 | Resolved: 2021-03-22

## 2021-03-22 PROCEDURE — 99214 OFFICE O/P EST MOD 30 MIN: CPT | Performed by: FAMILY MEDICINE

## 2021-03-22 PROCEDURE — 1036F TOBACCO NON-USER: CPT | Performed by: FAMILY MEDICINE

## 2021-03-22 PROCEDURE — 3008F BODY MASS INDEX DOCD: CPT | Performed by: FAMILY MEDICINE

## 2021-03-22 PROCEDURE — 97110 THERAPEUTIC EXERCISES: CPT

## 2021-03-22 RX ORDER — FAMOTIDINE 20 MG/1
20 TABLET, FILM COATED ORAL 2 TIMES DAILY
Qty: 28 TABLET | Refills: 0 | Status: SHIPPED | OUTPATIENT
Start: 2021-03-22 | End: 2021-05-18 | Stop reason: ALTCHOICE

## 2021-03-22 RX ORDER — NAPROXEN 500 MG/1
500 TABLET ORAL 2 TIMES DAILY WITH MEALS
Qty: 28 TABLET | Refills: 0 | Status: SHIPPED | OUTPATIENT
Start: 2021-03-22 | End: 2021-05-18 | Stop reason: ALTCHOICE

## 2021-03-22 RX ORDER — TRAMADOL HYDROCHLORIDE 50 MG/1
TABLET ORAL
COMMUNITY
Start: 2021-03-11 | End: 2021-03-22 | Stop reason: ALTCHOICE

## 2021-03-22 RX ORDER — METHOCARBAMOL 500 MG/1
500 TABLET, FILM COATED ORAL 2 TIMES DAILY
Qty: 28 TABLET | Refills: 0 | Status: SHIPPED | OUTPATIENT
Start: 2021-03-22 | End: 2021-05-18 | Stop reason: ALTCHOICE

## 2021-03-22 RX ORDER — DOXEPIN HYDROCHLORIDE 75 MG/1
CAPSULE ORAL
Qty: 90 CAPSULE | Refills: 1 | Status: SHIPPED | OUTPATIENT
Start: 2021-03-22 | End: 2021-04-11

## 2021-03-22 NOTE — PROGRESS NOTES
Progress Note     Today's date: 3/22/2021  Patient name: Michelle Goodman  : 1965  MRN: [id number]  Referring provider: Maik Calvillo, PT  Dx:   Encounter Diagnosis     ICD-10-CM    1  Acute left-sided low back pain, unspecified whether sciatica present  M54 5        Start Time: 925  Stop Time: 1010  Total time in clinic (min): 45 minutes    Subjective: Pt reports that today has not been a good day  Pain up to 8/10  Has had more pain in the last 1 5 weeks  Not sure why  Saw PCP this morning, was told to continue w/ PT  Pt notes that infusion treatments help her overall health and mental state but not low back pain  Has had a very stressful personal life as of late  Functional update below:     Goals  Short term goals (3 weeks): ALL MIN PROGRESS  1) Pt will improve thoracolumbar mobility deficits by 25% pain free  2) Pt will improve B LE and core strength deficits by 1/3 grade MMT  3) Pt will improve pain at worse to <4/10  4) Pt will initiate and progress HEP w/ special emphasis on functional core control and thoracolumbar mobility  Long term goals (6 weeks) ALL MIN PROGRESS  1) Pt will improve FOTO to at least 60   2) Pt will sleep through the night in positioning of choosing 6/7 nights a week w/o waking due to pain  3) Pt will perform full week of self care and home activities w/ improved thoracolumbar mechanics and pain <3/10 throughout     4) Pt will be independent and compliant w/ HEP in order to maximize functional benefit of skilled PT following d/c      *goals extended by 2 and 4 weeks, respectively       Pain  Current pain ratin  At best pain rating: 3  At worst pain ratin-9  Location: L side low back, L side hip, radiating down L LE> R LE   Relieving factors: heat and rest  Aggravating factors: stair climbing, standing, walking, overhead activity, lifting and running  Progression: similar since IE    Social Support  Steps to enter house: yes  Stairs in house: no   Lives in: one-story house  Lives with: alone    Employment status: not working (used to work for [employer] )  Patient Goals  Patient goals for therapy: increased strength, decreased pain, increased motion and return to sport/leisure activities  Patient goal: be able to walk, ride horses, move without pain! Objective     Concurrent Complaints  Negative for bladder dysfunction, bowel dysfunction and saddle (S4) numbness    Postural Observations  Seated posture: poor  Standing posture: poor  Correction of posture: makes symptoms better        Palpation     Additional Palpation Details  Pt is TTP and has increased tissue density through L side lumbar PS and QL/piri     Neurological Testing     Sensation     Lumbar   Left   Intact: light touch    Right   Intact: light touch    Reflexes   Left   Patellar (L4): normal (2+)  Achilles (S1): normal (2+)    Right   Patellar (L4): normal (2+)  Achilles (S1): normal (2+)    Active Range of Motion     Additional Active Range of Motion Details  Flex: 75% w/ bracing upon standing and mod pain, guarded   Ext: 50% w/ mod pain in L side low back  SB R: 50% w/ min L sided pain  SB L: 25% w/ mod L sided pain  Rot R: 25-50% w/ mod L sided pain  Rot L: 50-75% w/o significant pain    Guarded throughout - no significant change today      Strength/Myotome Testing     Additional Strength Details  B LE grossly 4-/5 w/ guarding and discomfort throughout    B knee flex/ext grossly 4/5     Muscle Activation   Patient unable to activate left transverse abdominals and right transverse abdominals       Additional Muscle Activation Details  Core Strength: 1/5 w/ cueing to maintain proper brace w/ 1+/5 march     Tests     Additional Tests Details  Manual lumbar traction: LAD on R provides relief to L, L side no relief w/ LAD    CORY provides relief w/ pillow under hips     Passive SLR to ~90 deg B w/o significant pain    Standing lumbar ext greatly increases sx    Mechanical lumbar traction provides good relief today    Ambulation     Ambulation: Level Surfaces     Additional Level Surfaces Ambulation Details  Forward flexed through hips and stiff through tspine and low back, poor WB on L LE, fatigues quickly, mod antalgic during clinical distances     Hunched forward at hips and low back, antalgic, guarded today - 3/22     Functional Assessment        Comments  Sit<>stand: UE support on LE w/ R sided WS and mod pain     BW squat: to 50% w/ R sided WS and mod pain    Stairs: TBA    SLS: unable on L LE s/t pain    *not reassessed 3/22 due to high pain levels         Assessment: Tolerated treatment fair  Patient demonstrated fatigue post treatment and would benefit from continued PT  Pt does well w/ mechanical traction, notes relief from sx to 4-5/10 to end session  Will explore this further in future sessions  She has not made significant progress since IE, however today was a difficult day to need an progress note as this was her worst day since starting skilled PT  There had been some initial improvement but sx have not continued to improve since  She is motivated to improve and will continue on 2x/week basis for next 2-4 weeks  Should she continue to fail to see improvement we will make appropriate referral       Plan: Continue per plan of care   traction progressions      Precautions: fibromyalgia, HTN       Date (Visit #) 3/22 (6) PN  3/1 (3)  3/15 (4)  3/18 (5)   Manual              DTM and TPR             Lumbar mobs     tested       Lumbar traction      LAD x2-3 min hold B LE  x2 min  x2 min B         PA sacral mobs in prone grade III x 1 min    trialed           3 x 1 min rounds supine AP L SI joint mobs           Exercise Diary              Ther Ex        Active w/u    recumbent pre 6 min lvl 3   upright pre 6 min lvl 2-3  x10 min mid session lvl 2-3   pre 10 min lvl 3-4   HS/glute/piri/HF stretching B HS and piri x2-3 mins  HS and piri x3-4 min total   B HS and piri  5 mins   B HS and piri x 5 min total HS and piri w/ intermittent LAD x 5 mins    Mobility (LTR, open books, cat/cow) x10-15  LTR 2x10 x10-15     CORY and PPU review x 1 min  x10-15 total     Review of prone work  x10-15     CORY x2 mins, PPU x 5 (deferred)    Standing ext deferred    Rows/ext  Blue tband sh ext 3x10   Yellow tubing rows and ext 3x10 each    Hip stability   S/l open books x 10-15 B  Review  Review     Mechanical traction 18 min up to 90 lbs intermittent         FOTO, ROM, HEP, MMT review x 5 mins                                Neuro Re Ed        TrA progressions    isos to 90/90 single leg ext taps 2x10-15  isos x10-15  isos x 10-15   isos x 10, ad sq x 10   Bridge progressions  2x10 w/ ad sq  2x10 w/ ad sq, march 2x10-15   2x10 w/ ad sq   3x10 w/ ad sq   Squat progressions   dowel hip hinge x10, reg squat at bar 2x10  dowel hip hinge x 10    Reg squat x 10-15   review      Hip abd walking/monster walking      SLDL x10-15        Balance  GTB pallof press 2x10 B, overhead chop x 10-15  Review of tband work  Yellow tubing pallof x 20 B     YTB sh ext w/ march 2x15       Standing lumbar ext w/ dowel x10-15 w/ demo and practice x2-3 mins  Review  Review       Review and update x 2-3 mins  Review x 2-3 mins  Review and update x 2-3 mins         pball iso press x 10, w/ GTB hip abd and ext 2x10 each  pball iso press x20 total, w/ WS demo and mini march x20 - explanation of form x 5 mins         Review of HEP and POC x 3-4 mins   Ther Act             Stairs             Functional Transfers             Functional Lifting                                                                                                                                                           Modalities             heat  pre x 5 mins seated            Ice              Mechanical Traction

## 2021-03-22 NOTE — PROGRESS NOTES
Assessment/Plan:  Left foot her back on the doxepin and were going to cut the Savella down as it really was not helping her anyway social to half a pill in the morning full pill at night for a week half a pill in the morning half a pill at night for a week and then 0 pills in the morning and half a pill at night for a week then stop    1  Vitamin D deficiency  -     Vitamin D 25 hydroxy; Future; Expected date: 2021    2  Fibromyalgia  -     doxepin (SINEquan) 75 MG capsule; 1 cap qhs  -     Ambulatory referral to Rheumatology; Future    3  Moderate episode of recurrent major depressive disorder (HCC)  -     doxepin (SINEquan) 75 MG capsule; 1 cap qhs    4  Iron deficiency  -     Ferritin; Future; Expected date: 2021    5  H/O gastric bypass  -     Ambulatory referral to Weight Management; Future    6  B12 deficiency  -     Vitamin B12; Future; Expected date: 2021    7  Class 2 severe obesity due to excess calories with serious comorbidity and body mass index (BMI) of 36 0 to 36 9 in adult ([facility] )    8  Acute left-sided low back pain with left-sided sciatica  Comments:  2-3 months but woseing last couple weeks - cant stand long or sit long   Orders:  -     methocarbamol (ROBAXIN) 500 mg tablet; Take 1 tablet (500 mg total) by mouth 2 (two) times a day for 14 days  -     naproxen (NAPROSYN) 500 mg tablet; Take 1 tablet (500 mg total) by mouth 2 (two) times a day with meals for 14 days  -     famotidine (PEPCID) 20 mg tablet; Take 1 tablet (20 mg total) by mouth 2 (two) times a day for 14 days         Subjective:      Patient ID: Priscila López is a 54 y o  female      HPI   Since last visit   On savella for fibro - was seeing rheum Dr Miguel Elliott but ins issues   Needs a new rheum   Also with iron def - seeing heme and 3 irons in and no majior improvement in sx   And vit d 50K  was slightly low at 28 and she is on otc   b12 def - on otc for that isreal   Was on doxapin  75mg   MDD:  Dog  son tried to kill himself       The following portions of the patient's history were reviewed and updated as appropriate: allergies, current medications, past family history, past medical history, past social history, past surgical history and problem list     Review of Systems   Constitutional: Negative for fever  Cardiovascular: Negative for chest pain  Gastrointestinal: Negative for abdominal pain  Genitourinary: Negative for dysuria and pelvic pain  Musculoskeletal: Positive for back pain  Neurological: Positive for weakness, numbness and headaches  All other systems reviewed and are negative  Objective:      /82 (BP Location: Left arm, Patient Position: Sitting, Cuff Size: Standard)   Pulse 88   Resp 16   Ht 5' 1" (1 549 m)   Wt 87 5 kg (193 lb)   SpO2 98%   BMI 36 47 kg/m²     No visits with results within 2 Week(s) from this visit     Latest known visit with results is:   Lab on 01/18/2021   Component Date Value    Vit D, 25-Hydroxy 01/18/2021 28 5*    Ferritin 01/18/2021 10     Sodium 01/18/2021 139     Potassium 01/18/2021 4 3     Chloride 01/18/2021 103     CO2 01/18/2021 28     ANION GAP 01/18/2021 8     BUN 01/18/2021 10     Creatinine 01/18/2021 0 71     Glucose, Fasting 01/18/2021 96     Calcium 01/18/2021 8 6     AST 01/18/2021 18     ALT 01/18/2021 20     Alkaline Phosphatase 01/18/2021 76     Total Protein 01/18/2021 7 4     Albumin 01/18/2021 3 5     Total Bilirubin 01/18/2021 0 26     eGFR 01/18/2021 96     WBC 01/18/2021 5 48     RBC 01/18/2021 4 10     Hemoglobin 01/18/2021 12 7     Hematocrit 01/18/2021 39 3     MCV 01/18/2021 96     MCH 01/18/2021 31 0     MCHC 01/18/2021 32 3     RDW 01/18/2021 11 2*    MPV 01/18/2021 9 9     Platelets 96/01/0711 236     nRBC 01/18/2021 0     Neutrophils Relative 01/18/2021 62     Immat GRANS % 01/18/2021 0     Lymphocytes Relative 01/18/2021 25     Monocytes Relative 01/18/2021 9     Eosinophils Relative 01/18/2021 3     Basophils Relative 01/18/2021 1     Neutrophils Absolute 01/18/2021 3 43     Immature Grans Absolute 01/18/2021 0 01     Lymphocytes Absolute 01/18/2021 1 35     Monocytes Absolute 01/18/2021 0 50     Eosinophils Absolute 01/18/2021 0 15     Basophils Absolute 01/18/2021 0 04     Vitamin B-12 01/18/2021 316     Hepatitis C Ab 01/18/2021 Non-reactive     HIV-1/HIV-2 Ab 01/18/2021 Non-Reactive           Physical Exam  Vitals signs and nursing note reviewed  Constitutional:       Appearance: She is well-developed  She is obese  HENT:      Head: Normocephalic and atraumatic  Neck:      Musculoskeletal: Normal range of motion and neck supple  Cardiovascular:      Rate and Rhythm: Normal rate and regular rhythm  Heart sounds: Normal heart sounds  No murmur  Pulmonary:      Effort: Pulmonary effort is normal       Breath sounds: Normal breath sounds  No wheezing or rales  Abdominal:      General: Bowel sounds are normal  There is no distension  Palpations: Abdomen is soft  Tenderness: There is no abdominal tenderness  Musculoskeletal: Normal range of motion  General: No tenderness  Lymphadenopathy:      Cervical: No cervical adenopathy  Skin:     General: Skin is warm and dry  Capillary Refill: Capillary refill takes less than 2 seconds  Findings: No rash  Neurological:      Mental Status: She is alert and oriented to person, place, and time  Cranial Nerves: No cranial nerve deficit  Sensory: No sensory deficit  Motor: No abnormal muscle tone  Psychiatric:         Behavior: Behavior normal          Thought Content:  Thought content normal          Judgment: Judgment normal               Polly Carrero MD  [place] [id number]

## 2021-03-25 ENCOUNTER — APPOINTMENT (OUTPATIENT)
Dept: PHYSICAL THERAPY | Facility: CLINIC | Age: 56
End: 2021-03-25
Payer: COMMERCIAL

## 2021-03-26 ENCOUNTER — HOSPITAL ENCOUNTER (OUTPATIENT)
Dept: INFUSION CENTER | Facility: CLINIC | Age: 56
Discharge: HOME | End: 2021-03-26
Payer: COMMERCIAL

## 2021-03-26 VITALS
RESPIRATION RATE: 20 BRPM | DIASTOLIC BLOOD PRESSURE: 62 MMHG | SYSTOLIC BLOOD PRESSURE: 152 MMHG | TEMPERATURE: 98.2 F | HEART RATE: 88 BPM

## 2021-03-26 DIAGNOSIS — E61.1 IRON DEFICIENCY: Primary | ICD-10-CM

## 2021-03-26 DIAGNOSIS — E53.8 B12 DEFICIENCY: ICD-10-CM

## 2021-03-26 PROCEDURE — 96372 THER/PROPH/DIAG INJ SC/IM: CPT

## 2021-03-26 PROCEDURE — 96365 THER/PROPH/DIAG IV INF INIT: CPT

## 2021-03-26 RX ORDER — SODIUM CHLORIDE 9 MG/ML
20 INJECTION, SOLUTION INTRAVENOUS ONCE
Status: COMPLETED | OUTPATIENT
Start: 2021-03-26 | End: 2021-03-26

## 2021-03-26 RX ORDER — CYANOCOBALAMIN 1000 UG/ML
1000 INJECTION INTRAMUSCULAR; SUBCUTANEOUS ONCE
Status: COMPLETED | OUTPATIENT
Start: 2021-03-26 | End: 2021-03-26

## 2021-03-26 RX ORDER — CYANOCOBALAMIN 1000 UG/ML
1000 INJECTION INTRAMUSCULAR; SUBCUTANEOUS ONCE
Status: CANCELLED | OUTPATIENT
Start: 2021-04-02

## 2021-03-26 RX ORDER — SODIUM CHLORIDE 9 MG/ML
20 INJECTION, SOLUTION INTRAVENOUS ONCE
Status: CANCELLED | OUTPATIENT
Start: 2021-04-02

## 2021-03-26 RX ADMIN — IRON SUCROSE 300 MG: 20 INJECTION, SOLUTION INTRAVENOUS at 13:38

## 2021-03-26 RX ADMIN — CYANOCOBALAMIN 1000 MCG: 1000 INJECTION, SOLUTION INTRAMUSCULAR; SUBCUTANEOUS at 13:39

## 2021-03-26 RX ADMIN — SODIUM CHLORIDE 20 ML/HR: 0.9 INJECTION, SOLUTION INTRAVENOUS at 13:30

## 2021-03-30 ENCOUNTER — TELEPHONE (OUTPATIENT)
Dept: PHYSICAL THERAPY | Facility: CLINIC | Age: 56
End: 2021-03-30

## 2021-03-30 NOTE — TELEPHONE ENCOUNTER
Called pt to discuss POC as she cancelled last visit and has no future visits scheduled  Will attempt again in future if no call back  Asked for call back regarding future sessions and need for more sessions

## 2021-04-02 ENCOUNTER — HOSPITAL ENCOUNTER (OUTPATIENT)
Dept: INFUSION CENTER | Facility: CLINIC | Age: 56
End: 2021-04-02

## 2021-04-02 ENCOUNTER — TELEPHONE (OUTPATIENT)
Dept: INFUSION CENTER | Facility: CLINIC | Age: 56
End: 2021-04-02

## 2021-04-02 NOTE — TELEPHONE ENCOUNTER
Received VM from Savannah Hernandez today from 7:54am that only included her name and telephone number  I looked into her record and noted that she is scheduled today in the afternoon to receive Venofer  I attempted to return her call  I left her VM with our hours of operation and requested a return call so that we may assist her

## 2021-04-02 NOTE — TELEPHONE ENCOUNTER
Km Borges called back stating that she is not feeling well and needs to Cx today's Venofer Infusion  She will keep her appnt next week and reschedule this appnt while she is here next week

## 2021-04-05 ENCOUNTER — OFFICE VISIT (OUTPATIENT)
Dept: PHYSICAL THERAPY | Facility: CLINIC | Age: 56
End: 2021-04-05
Payer: COMMERCIAL

## 2021-04-05 DIAGNOSIS — M54.50 ACUTE LEFT-SIDED LOW BACK PAIN, UNSPECIFIED WHETHER SCIATICA PRESENT: Primary | ICD-10-CM

## 2021-04-05 PROCEDURE — 97110 THERAPEUTIC EXERCISES: CPT

## 2021-04-05 NOTE — PROGRESS NOTES
Daily Note     Today's date: 2021  Patient name: Reymundo Giraldo  : 1965  MRN: [id number]  Referring provider: Michell Rojas, PT  Dx:   Encounter Diagnosis     ICD-10-CM    1  Acute left-sided low back pain, unspecified whether sciatica present  M54 5                   Subjective: Pt reports that she felt good for 1-2 days after last session, thinks that traction table was beneficial  Reports that she has had more pain lately, pain up to 8/10  Wanted to garden this morning but was unable to due to pain  Objective: no clear directional bias today, all forms of motion increase or maintain pain  Assessment: Tolerated treatment fair  Patient demonstrated fatigue post treatment and would benefit from continued PT  Pt reports no significant change within session today  She has significant discomfort to begin and end, w/ no clear pattern of exacerbating or relieving activity  I walked her over to ortho office to get an appt w/ pain management  She has more visits here and we will continue to work through pain process, but further referral is appropriate at this point due to continued nature of pain, variability and high irritability of sx, and no clear benefit from PT to date  Pt in agreement w/ plan and will call w/ any changes  Plan: Continue per plan of care   check on MD follow up      Precautions: fibromyalgia, HTN       Date (Visit #) 3/22 (6) PN  (7)   3/15 (4)  3/18 (5)   Manual              DTM and TPR             Lumbar mobs     tested       Lumbar traction      LAD x2-3 min hold B LE  x2 min  x2 min B         PA sacral mobs in prone grade III x 1 min    trialed           3 x 1 min rounds supine AP L SI joint mobs           Exercise Diary              Ther Ex        Active w/u     upright pre 6 min lvl 2-3  x10 min mid session lvl 2-3   pre 10 min lvl 3-4   HS/glute/piri/HF stretching B HS and piri x2-3 mins  No   B HS and piri  5 mins   B HS and piri x 5 min total   HS and piri w/ intermittent LAD x 5 mins    Mobility (LTR, open books, cat/cow) x10-15   x10-15     CORY and PPU review x 1 min  x10-15 total     Review of prone work  x10-15     CORY x2 mins, PPU x 5 (deferred)    Standing ext deferred    Rows/ext    Yellow tubing rows and ext 3x10 each    Hip stability   S/l open books x 10-15 B  Review  Review     Mechanical traction 18 min up to 90 lbs intermittent  Mechanical traction x 17 min up to 93 lbs intermittent in supported hooklying       FOTO, ROM, HEP, MMT review x 5 mins  Review of HEP and POC, walked to ortho x 6-7 mins                              Neuro Re Ed        TrA progressions     isos x10-15  isos x 10-15   isos x 10, ad sq x 10   Bridge progressions    2x10 w/ ad sq, march 2x10-15   2x10 w/ ad sq   3x10 w/ ad sq   Squat progressions    dowel hip hinge x 10    Reg squat x 10-15   review      Hip abd walking/monster walking      SLDL x10-15        Balance   Review of tband work  Yellow tubing pallof x 20 B     YTB sh ext w/ march 2x15        Review  Review        Review x 2-3 mins  Review and update x 2-3 mins         pball iso press x 10, w/ GTB hip abd and ext 2x10 each  pball iso press x20 total, w/ WS demo and mini march x20 - explanation of form x 5 mins         Review of HEP and POC x 3-4 mins   Ther Act             Stairs             Functional Transfers             Functional Lifting                                                                                                                                                           Modalities             heat  pre x 5 mins seated            Ice              Mechanical Traction

## 2021-04-06 DIAGNOSIS — M79.7 FIBROMYALGIA: ICD-10-CM

## 2021-04-06 RX ORDER — MILNACIPRAN HYDROCHLORIDE 50 MG/1
TABLET, FILM COATED ORAL 2 TIMES DAILY
OUTPATIENT
Start: 2021-04-06

## 2021-04-07 ENCOUNTER — APPOINTMENT (OUTPATIENT)
Dept: PHYSICAL THERAPY | Facility: CLINIC | Age: 56
End: 2021-04-07
Payer: COMMERCIAL

## 2021-04-09 ENCOUNTER — HOSPITAL ENCOUNTER (OUTPATIENT)
Dept: INFUSION CENTER | Facility: CLINIC | Age: 56
Discharge: HOME | End: 2021-04-09
Payer: COMMERCIAL

## 2021-04-09 VITALS — TEMPERATURE: 97.5 F | HEART RATE: 74 BPM | OXYGEN SATURATION: 96 % | RESPIRATION RATE: 15 BRPM

## 2021-04-09 DIAGNOSIS — E61.1 IRON DEFICIENCY: Primary | ICD-10-CM

## 2021-04-09 DIAGNOSIS — E55.9 VITAMIN D DEFICIENCY: ICD-10-CM

## 2021-04-09 DIAGNOSIS — E53.8 B12 DEFICIENCY: ICD-10-CM

## 2021-04-09 PROCEDURE — 96365 THER/PROPH/DIAG IV INF INIT: CPT

## 2021-04-09 PROCEDURE — 96372 THER/PROPH/DIAG INJ SC/IM: CPT

## 2021-04-09 RX ORDER — SODIUM CHLORIDE 9 MG/ML
20 INJECTION, SOLUTION INTRAVENOUS ONCE
Status: COMPLETED | OUTPATIENT
Start: 2021-04-09 | End: 2021-04-09

## 2021-04-09 RX ORDER — SODIUM CHLORIDE 9 MG/ML
20 INJECTION, SOLUTION INTRAVENOUS ONCE
Status: CANCELLED | OUTPATIENT
Start: 2021-04-10

## 2021-04-09 RX ORDER — ERGOCALCIFEROL 1.25 MG/1
CAPSULE ORAL
Qty: 12 CAPSULE | Refills: 0 | Status: SHIPPED | OUTPATIENT
Start: 2021-04-09 | End: 2021-07-18

## 2021-04-09 RX ORDER — CYANOCOBALAMIN 1000 UG/ML
1000 INJECTION INTRAMUSCULAR; SUBCUTANEOUS ONCE
Status: CANCELLED | OUTPATIENT
Start: 2021-04-10

## 2021-04-09 RX ORDER — CYANOCOBALAMIN 1000 UG/ML
1000 INJECTION INTRAMUSCULAR; SUBCUTANEOUS ONCE
Status: COMPLETED | OUTPATIENT
Start: 2021-04-09 | End: 2021-04-09

## 2021-04-09 RX ADMIN — CYANOCOBALAMIN 1000 MCG: 1000 INJECTION, SOLUTION INTRAMUSCULAR; SUBCUTANEOUS at 13:26

## 2021-04-09 RX ADMIN — IRON SUCROSE 300 MG: 20 INJECTION, SOLUTION INTRAVENOUS at 13:24

## 2021-04-09 RX ADMIN — SODIUM CHLORIDE 20 ML/HR: 0.9 INJECTION, SOLUTION INTRAVENOUS at 13:24

## 2021-04-09 NOTE — PROGRESS NOTES
Pt resting with no complaints except for hip pain which has been ongoing  Encouraged her to call MD if pain continues  Verbalized understanding, vitals stable, call bell within reach  All questions answered and emotional support given  Will continue to monitor

## 2021-04-09 NOTE — PLAN OF CARE
Problem: Potential for Falls  Goal: Patient will remain free of falls  Description: INTERVENTIONS:  - Assess patient frequently for physical needs  -  Identify cognitive and physical deficits and behaviors that affect risk of falls    -  Fieldon fall precautions as indicated by assessment   - Educate patient/family on patient safety including physical limitations  - Instruct patient to call for assistance with activity based on assessment  - Modify environment to reduce risk of injury  - Consider OT/PT consult to assist with strengthening/mobility  Outcome: Progressing

## 2021-04-11 DIAGNOSIS — F33.1 MODERATE EPISODE OF RECURRENT MAJOR DEPRESSIVE DISORDER (HCC): ICD-10-CM

## 2021-04-11 DIAGNOSIS — M79.7 FIBROMYALGIA: ICD-10-CM

## 2021-04-11 RX ORDER — DOXEPIN HYDROCHLORIDE 75 MG/1
CAPSULE ORAL
Qty: 180 CAPSULE | Refills: 1 | Status: SHIPPED | OUTPATIENT
Start: 2021-04-11 | End: 2022-01-03

## 2021-04-12 ENCOUNTER — OFFICE VISIT (OUTPATIENT)
Dept: PHYSICAL THERAPY | Facility: CLINIC | Age: 56
End: 2021-04-12
Payer: COMMERCIAL

## 2021-04-12 DIAGNOSIS — M54.50 ACUTE LEFT-SIDED LOW BACK PAIN, UNSPECIFIED WHETHER SCIATICA PRESENT: Primary | ICD-10-CM

## 2021-04-12 PROCEDURE — 97110 THERAPEUTIC EXERCISES: CPT

## 2021-04-12 PROCEDURE — 97140 MANUAL THERAPY 1/> REGIONS: CPT

## 2021-04-13 DIAGNOSIS — Z23 ENCOUNTER FOR IMMUNIZATION: ICD-10-CM

## 2021-04-14 ENCOUNTER — OFFICE VISIT (OUTPATIENT)
Dept: PHYSICAL THERAPY | Facility: CLINIC | Age: 56
End: 2021-04-14
Payer: COMMERCIAL

## 2021-04-14 DIAGNOSIS — M79.7 FIBROMYALGIA: ICD-10-CM

## 2021-04-14 DIAGNOSIS — M54.50 ACUTE LEFT-SIDED LOW BACK PAIN, UNSPECIFIED WHETHER SCIATICA PRESENT: Primary | ICD-10-CM

## 2021-04-14 PROCEDURE — 97110 THERAPEUTIC EXERCISES: CPT

## 2021-04-14 PROCEDURE — 97140 MANUAL THERAPY 1/> REGIONS: CPT

## 2021-04-14 NOTE — PROGRESS NOTES
Daily Note     Today's date: 2021  Patient name: Reymundo Giraldo  : 1965  MRN: [id number]  Referring provider: Ann Marie Garica MD  Dx:   Encounter Diagnosis     ICD-10-CM    1  Acute left-sided low back pain, unspecified whether sciatica present  M54 5                   Subjective: Pt reports that she had some relief after last session that lasted until she went to bed, pain returned at that time  Not quite as bad today as at last session  Notes clicking in hip and feels that her hip is the main issue  Objective: MWM w/ belt distraction for hip IR/ER and flexion improve pain free ROM, well maintained by end of session  Assessment: Tolerated treatment well  Patient demonstrated fatigue post treatment and would benefit from continued PT      Plan: Continue per plan of care        Precautions: fibromyalgia, HTN       Date (Visit #) 3/22 (6) PN 4 (7)   (8)   (9)     Manual              DTM and TPR             Lumbar mobs           Lumbar traction     LAD on L side 3x2 min holds Same x 3 sets   x2 min B        MET for L innonimate post rotation attempted x 2-3 mins  rev  trialed            MWM w/ belt distraction for L hip IR/ER and flex x 8 min total        Exercise Diary              Ther Ex        Active w/u       pre 10 min lvl 3-4   HS/glute/piri/HF stretching B HS and piri x2-3 mins  No  L side HS and piri glute attempted x 2-3 mins   L side HS and piri/glute x 4-5   HS and piri w/ intermittent LAD x 5 mins    Mobility (LTR, open books, cat/cow) x10-15   LTR 2x10-15, s/l open books on L side attempted x 10-15  x10-15 total      x10-15     CORY x2 mins, PPU x 5 (deferred)    Standing ext deferred    Rows/ext   Reflexes and red flag check x 2-3 mins Ad sq w/ ball in sitting x 20, belt abd x 20     Hip stability   Self lumbar correction in sitting w/ cueing x 5 mins Gait review x 3-4 mins  Review     Mechanical traction 18 min up to 90 lbs intermittent  Mechanical traction x 17 min up to 93 lbs intermittent in supported hooklying  Standing march x 20     FOTO, ROM, HEP, MMT review x 5 mins  Review of HEP and POC, walked to ortho x 6-7 mins Review and update x 2-3 mins Review and update x 2-3 mins       Ad sq in hooklying and sitting x 3-4 mins        Self hip abd in sitting x3-4 mins              Neuro Re Ed        TrA progressions     isos x10-15   isos x 10, ad sq x 10   Bridge progressions      3x10 w/ ad sq   Squat progressions         Hip abd walking/monster walking            Balance                             pball iso press x20 total, w/ WS demo and mini march x20 - explanation of form x 5 mins         Review of HEP and POC x 3-4 mins   Ther Act             Stairs             Functional Transfers             Functional Lifting                                                                                                                                                           Modalities             heat  pre x 5 mins seated     pre seated x 6 mins       Ice              Mechanical Traction

## 2021-04-15 RX ORDER — MILNACIPRAN HYDROCHLORIDE 50 MG/1
TABLET, FILM COATED ORAL 2 TIMES DAILY
Refills: 0 | OUTPATIENT
Start: 2021-04-15

## 2021-04-15 RX ORDER — MILNACIPRAN HYDROCHLORIDE 50 MG/1
TABLET, FILM COATED ORAL 2 TIMES DAILY
OUTPATIENT
Start: 2021-04-15

## 2021-04-19 ENCOUNTER — APPOINTMENT (OUTPATIENT)
Dept: PHYSICAL THERAPY | Facility: CLINIC | Age: 56
End: 2021-04-19
Payer: COMMERCIAL

## 2021-04-21 ENCOUNTER — OFFICE VISIT (OUTPATIENT)
Dept: PHYSICAL THERAPY | Facility: CLINIC | Age: 56
End: 2021-04-21
Payer: COMMERCIAL

## 2021-04-21 DIAGNOSIS — M54.50 ACUTE LEFT-SIDED LOW BACK PAIN, UNSPECIFIED WHETHER SCIATICA PRESENT: Primary | ICD-10-CM

## 2021-04-21 PROCEDURE — 97140 MANUAL THERAPY 1/> REGIONS: CPT

## 2021-04-21 PROCEDURE — 97110 THERAPEUTIC EXERCISES: CPT

## 2021-04-21 NOTE — PROGRESS NOTES
Progress Note     Today's date: 2021  Patient name: Codey Lei  : 1965  MRN: [id number]  Referring provider: Eric Reese MD  Dx:   Encounter Diagnosis     ICD-10-CM    1  Acute left-sided low back pain, unspecified whether sciatica present  M54 5 PT plan of care cert/re-cert       Start Time: 1315  Stop Time: 1350  Total time in clinic (min): 35 minutes    Subjective: Pt reports continued pain in L side posterior and anterior hip, and to a lesser extent through low back  Reports that she feels good after leaving PT, has a few hours of relief, but pain always returns  Reports that she tried to garden over the weekend and pain was increased on Monday, , had to cancel as a result  Wants to continue w/ PT until MD follow up next week, as she seems to improve post sessions  Functional update below:       Goals  Short term goals (3 weeks): ALL MIN PROGRESS  1) Pt will improve thoracolumbar mobility deficits by 25% pain free  2) Pt will improve B LE and core strength deficits by 1/3 grade MMT  3) Pt will improve pain at worse to <4/10  4) Pt will initiate and progress HEP w/ special emphasis on functional core control and thoracolumbar mobility  Long term goals (6 weeks) ALL MIN PROGRESS  1) Pt will improve FOTO to at least 60   2) Pt will sleep through the night in positioning of choosing 6/7 nights a week w/o waking due to pain  3) Pt will perform full week of self care and home activities w/ improved thoracolumbar mechanics and pain <3/10 throughout  4) Pt will be independent and compliant w/ HEP in order to maximize functional benefit of skilled PT following d/c      *goals extended by 2 and 4 weeks, respectively   : goals again extended by 2 and 4 weeks as significant progress has not been made in last 2-3 weeks  To be updated in accordance w/ results of MD visit         Pain  Current pain ratin  At best pain rating: 3  At worst pain ratin-9  Location: L side low back, L side hip, radiating down L LE> R LE   Relieving factors: heat and rest  Aggravating factors: stair climbing, standing, walking, overhead activity, lifting and running  Progression: no change since last PN     Social Support  Steps to enter house: yes  Stairs in house: no   Lives in: [address] [address]  Lives with: alone    Employment status: not working (used to work for [employer] )  Patient Goals  Patient goals for therapy: increased strength, decreased pain, increased motion and return to sport/leisure activities  Patient goal: be able to walk, ride horses, move without pain!         Objective     Concurrent Complaints  Negative for bladder dysfunction, bowel dysfunction and saddle (S4) numbness    Postural Observations  Seated posture: poor  Standing posture: poor  Correction of posture: makes symptoms better        Palpation     Additional Palpation Details  Pt is TTP and has increased tissue density through L side lumbar PS and QL/piri    -4/21: TTP through anterior L hip extending into groin, pain w/ palpation of posterior aspect of L hip    Neurological Testing     Sensation     Lumbar   Left   Intact: light touch    Right   Intact: light touch    Reflexes   Left   Patellar (L4): normal (2+)  Achilles (S1): normal (2+)    Right   Patellar (L4): normal (2+)  Achilles (S1): normal (2+)    Active Range of Motion     Additional Active Range of Motion Details  Flex: 75% w/ bracing upon standing and mod pain, guarded   Ext: 50% w/ mod pain in L side low back  SB R: 50% w/ min L sided pain  SB L: 25% w/ mod L sided pain  Rot R: 25-50% w/ mod L sided pain  Rot L: 50-75% w/o significant pain    Guarded throughout - no significant change today    4/21: no significant change, pain w/ B SB to no greater than 50%, rotations guarded L>R      L hip flex and IR/ER grossly 50% of normal limits, improving to grossly 75% immediately post MWM       Strength/Myotome Testing     Additional Strength Details  B LE grossly 4-/5 w/ guarding and discomfort throughout    B knee flex/ext grossly 4/5 4/21: no change     Muscle Activation   Patient unable to activate left transverse abdominals and right transverse abdominals  Additional Muscle Activation Details  Core Strength: 1/5 w/ cueing to maintain proper brace w/ 1+/5 march - continued 4/21    Tests     Additional Tests Details  Manual lumbar traction: LAD on R provides relief to L, L side no relief w/ LAD    CORY provides relief w/ pillow under hips - 4/21: not attempted as ext increases pain in all positions over last 2-3 visits     Passive SLR to ~90 deg B w/o significant pain - 4/21: through ~75-80 deg on L before significant stretch     Standing lumbar ext greatly increases sx - 4/21: continued     Mechanical lumbar traction provides good relief today - 4/21: not attempted as this has increased pain in recent sessions    MWM for L hip flexion and IR/ER and belt distraction for L hip have improved pain free motions post- not maintained between sessions     Ambulation     Ambulation: Level Surfaces     Additional Level Surfaces Ambulation Details  Forward flexed through hips and stiff through tspine and low back, poor WB on L LE, fatigues quickly, mod antalgic during clinical distances     Hunched forward at hips and low back, antalgic, guarded today - 3/22     4/21: similar w/ decreased WB and toe touch only on L side, mod antalgic     Functional Assessment        Comments  Sit<>stand: UE support on LE w/ R sided WS and mod pain     BW squat: to 50% w/ R sided WS and mod pain    Stairs: TBA    SLS: unable on L LE s/t pain    *not reassessed 3/22 due to high pain levels     4/21: grossly similar           Assessment: Tolerated treatment fair  Patient would benefit from continued PT  Pt sx largely similar to last PN  Pain is about the same, motion and strength are similar as well  FOTO was similar to IE   She has made progress towards goals, but carryover of progress between sessions has slowed recently  She does appear to have emerging hip sx, does well w/ manual work but does not tolerate significant exercise  Pain in L hip worse w/ WB and attempts at functional strengthening  Will continue w/ pain relief until pain management visit, assess their report and progress as appropriate  Plan: Continue per plan of care   prep for MD visit, MWM and belt progressions      Precautions: fibromyalgia, HTN       Date (Visit #) 3/22 (6) PN 4/5 (7)  4/12 (8)  4/14 (9)  4/21 (10) PN   Manual              DTM and TPR             Lumbar mobs           Lumbar traction     LAD on L side 3x2 min holds Same x 3 sets  LAD on L 3-4x1-2 min holds         MET for L innonimate post rotation attempted x 2-3 mins  rev            MWM w/ belt distraction for L hip IR/ER and flex x 8 min total belt distraction on L x 3-4 mins total, w/ MWM for IR/ER and flex x 10-12 each       Exercise Diary              Ther Ex        Active w/u         HS/glute/piri/HF stretching B HS and piri x2-3 mins  No  L side HS and piri glute attempted x 2-3 mins   L side HS and piri/glute x 4-5  HS and glute piri x 7 min B    Mobility (LTR, open books, cat/cow) x10-15   LTR 2x10-15, s/l open books on L side attempted x 10-15  x10-15 total      LTR x 10-15, s/l open books attempted but deferred    Rows/ext   Reflexes and red flag check x 2-3 mins Ad sq w/ ball in sitting x 20, belt abd x 20  Ad sq rev   Hip stability   Self lumbar correction in sitting w/ cueing x 5 mins Gait review x 3-4 mins      Mechanical traction 18 min up to 90 lbs intermittent  Mechanical traction x 17 min up to 93 lbs intermittent in supported hooklying  Standing march x 20     FOTO, ROM, HEP, MMT review x 5 mins  Review of HEP and POC, walked to ortho x 6-7 mins Review and update x 2-3 mins Review and update x 2-3 mins Review and update w/ FOTO x 5 mins       Ad sq in hooklying and sitting x 3-4 mins        Self hip abd in sitting x3-4 mins   Rev of lumbar stab in supine x 2-3 mins            Neuro Re Ed        TrA progressions     isos x10-15     Bridge progressions        Squat progressions        Hip abd walking/monster walking           Balance                                        Ther Act             Stairs             Functional Transfers             Functional Lifting                                                                                                                                                           Modalities             heat  pre x 5 mins seated     pre seated x 6 mins    pre x 7 min seated    Ice              Mechanical Traction

## 2021-04-26 ENCOUNTER — OFFICE VISIT (OUTPATIENT)
Dept: PHYSICAL THERAPY | Facility: CLINIC | Age: 56
End: 2021-04-26
Payer: COMMERCIAL

## 2021-04-26 DIAGNOSIS — M54.50 ACUTE LEFT-SIDED LOW BACK PAIN, UNSPECIFIED WHETHER SCIATICA PRESENT: Primary | ICD-10-CM

## 2021-04-26 PROCEDURE — 97140 MANUAL THERAPY 1/> REGIONS: CPT

## 2021-04-26 PROCEDURE — 97110 THERAPEUTIC EXERCISES: CPT

## 2021-04-26 NOTE — PROGRESS NOTES
Daily Note     Today's date: 2021  Patient name: Meryl Diehl  : 1965  MRN: [id number]  Referring provider: Gunner Cha MD  Dx:   Encounter Diagnosis     ICD-10-CM    1  Acute left-sided low back pain, unspecified whether sciatica present  M54 5                   Update 21: Pt chart to be formally d/c  She has not reached out for more visits in last month and has no more visits scheduled  Should she require more help, a new chart will be opened  Subjective: Pt reports that hip is still sore, felt good after last session but pain returns  Pain at 7/10 to start  Sees MD tomorrow, is hopeful that her hip will be more thoroughly evaluated  Objective: Pt requires cueing for form w/ proper HF stretching on L side, no difficulty and no stretch on R  L hip s/l abd significantly pain on L, pain free and full strength on R  Assessment: Tolerated treatment well  Patient demonstrated fatigue post treatment and would benefit from continued PT  Min fatigue but overall improvement in mobility post session  Does well w/ manual work, light strethcing, and exercise review  Will discuss MD findings and POC following tomorrow's MD visit  Her hip appears to be more and more of a source of pain  (+) scour, decreased hip rotational ROM, improvement w/ MWM and LAD all point to hip being contributing factor  Plan: Continue per plan of care  review MD findings, MWM progressions, active mobility progressions       Precautions: fibromyalgia, HTN       Date (Visit #)  (11)    (8)   (9)   (10) PN   Manual              DTM and TPR             Lumbar mobs           Lumbar traction  LAD on L x 3-4 min total    LAD on L side 3x2 min holds Same x 3 sets  LAD on L 3-4x1-2 min holds         MET for L innonimate post rotation attempted x 2-3 mins  rev       lat belt distraction L hip 3x2 min, MWM for flex and IR x 10-15 each     MWM w/ belt distraction for L hip IR/ER and flex x 8 min total belt distraction on L x 3-4 mins total, w/ MWM for IR/ER and flex x 10-12 each       Exercise Diary              Ther Ex        Active w/u         HS/glute/piri/HF stretching B HS and piri x8 No  L side HS and piri glute attempted x 2-3 mins   L side HS and piri/glute x 4-5  HS and glute piri x 7 min B    Mobility (LTR, open books, cat/cow) x10-15   LTR 2x10-15, s/l open books on L side attempted x 10-15  x10-15 total      LTR x 10-15, s/l open books attempted but deferred    Rows/ext Self HF stretching x 3-4 min total   Reflexes and red flag check x 2-3 mins Ad sq w/ ball in sitting x 20, belt abd x 20  Ad sq rev   Hip stability   Self lumbar correction in sitting w/ cueing x 5 mins Gait review x 3-4 mins       Mechanical traction x 17 min up to 93 lbs intermittent in supported hooklying  Standing march x 20     Review of HEP and POC x 3-4 mins Review of HEP and POC, walked to ortho x 6-7 mins Review and update x 2-3 mins Review and update x 2-3 mins Review and update w/ FOTO x 5 mins     SLR x 10 B, hip abd attempted but deferred   Ad sq in hooklying and sitting x 3-4 mins        Self hip abd in sitting x3-4 mins   Rev of lumbar stab in supine x 2-3 mins            Neuro Re Ed        TrA progressions     isos x10-15     Bridge progressions        Squat progressions        Hip abd walking/monster walking           Balance                                        Ther Act             Stairs             Functional Transfers             Functional Lifting                                                                                                                                                           Modalities             heat  pre x 5 mins seated     pre seated x 6 mins    pre x 7 min seated    Ice              Mechanical Traction

## 2021-04-27 ENCOUNTER — OFFICE VISIT (OUTPATIENT)
Dept: OBGYN CLINIC | Facility: CLINIC | Age: 56
End: 2021-04-27
Payer: COMMERCIAL

## 2021-04-27 ENCOUNTER — APPOINTMENT (OUTPATIENT)
Dept: RADIOLOGY | Facility: AMBULARY SURGERY CENTER | Age: 56
End: 2021-04-27
Payer: COMMERCIAL

## 2021-04-27 VITALS
DIASTOLIC BLOOD PRESSURE: 96 MMHG | WEIGHT: 198 LBS | HEART RATE: 92 BPM | SYSTOLIC BLOOD PRESSURE: 152 MMHG | HEIGHT: 61 IN | BODY MASS INDEX: 37.38 KG/M2

## 2021-04-27 DIAGNOSIS — M54.42 CHRONIC LEFT-SIDED LOW BACK PAIN WITH LEFT-SIDED SCIATICA: ICD-10-CM

## 2021-04-27 DIAGNOSIS — M79.7 FIBROMYALGIA: ICD-10-CM

## 2021-04-27 DIAGNOSIS — M25.552 PAIN IN LEFT HIP: Primary | ICD-10-CM

## 2021-04-27 DIAGNOSIS — M25.552 PAIN IN LEFT HIP: ICD-10-CM

## 2021-04-27 DIAGNOSIS — G89.29 CHRONIC LEFT-SIDED LOW BACK PAIN WITH LEFT-SIDED SCIATICA: ICD-10-CM

## 2021-04-27 PROCEDURE — 73502 X-RAY EXAM HIP UNI 2-3 VIEWS: CPT

## 2021-04-27 PROCEDURE — 3008F BODY MASS INDEX DOCD: CPT | Performed by: PHYSICAL MEDICINE & REHABILITATION

## 2021-04-27 PROCEDURE — 99243 OFF/OP CNSLTJ NEW/EST LOW 30: CPT | Performed by: PHYSICAL MEDICINE & REHABILITATION

## 2021-04-27 PROCEDURE — 1036F TOBACCO NON-USER: CPT | Performed by: PHYSICAL MEDICINE & REHABILITATION

## 2021-04-27 NOTE — PROGRESS NOTES
1  Pain in left hip  XR hip/pelv 2-3 vws left if performed   2  Fibromyalgia     3  Chronic left-sided low back pain with left-sided sciatica       Orders Placed This Encounter   Procedures    XR hip/pelv 2-3 vws left if performed      Impression:  The patient's pain is multifactorial and is predominantly due to left hip joint derangement and greater trochanteric pain syndrome  There are no concerning neurological deficits  We obtained hip x-rays as below  Her hip joint pain is possibly due to labral derangement  We discussed different treatment options and will be proceeding with a an ultrasound-guided left hip joint injection  This will serve both diagnostic and therapeutic purposes  If no improvement, we will proceed with an MRI of her spine  In regards to the calcification seen her left flank area, we will have her follow up with her primary care physician for this  Patient is in agreement with the above plan  No follow-ups on file  or earlier if symptoms worsen/change  Imaging Studies (I personally reviewed images in PACS and report if it was available):  Lumbar spine x-rays that are most recent to this encounter were reviewed  These images show   "There are 5 non rib bearing lumbar vertebral bodies       There is no evidence of acute fracture or destructive osseous lesion      Mild dextroconvex scoliosis       There are degenerative changes in the facet joints of the lower lumbar spine and in the disc spaces of the lower thoracic and upper lumbar spine      The pedicles appear intact  No pars defects  SI joints appear normal      There are nonspecific calcifications in the left flank area not clearly related to the left kidney      IMPRESSION:     No acute osseous abnormality        Degenerative changes as described      Scoliosis      Nonspecific left flank calcification of uncertain etiology "    Left hip x-rays obtained on 4/27/2021  These images show preserved left hip joint space  No acute osseous abnormalities    HPI:  Tammi Vyas is a 64 y o  female  who presents for evaluation of   Chief Complaint   Patient presents with    Lower Back - Pain       Onset/Mechanism:  Pain started about a month ago without an injury  Location:  Left groin, left hip and left lower back area  Radiation:  Denies  Quality:  Aching  Provocative: Getting out of a car  Putting on socks and shoes  Severity:  5/10 in severity  Associated Symptoms: Trouble getting out of a car  Trouble going from sit to stand if she has been sitting for a while  Treatment so far: Physical therapy, naproxen and methocarbamol  Review of Systems   Constitutional: Positive for activity change  Negative for fever  HENT: Negative for sore throat  Eyes: Negative for visual disturbance  Respiratory: Negative for shortness of breath  Cardiovascular: Negative for chest pain  Gastrointestinal: Negative for abdominal pain  Endocrine: Negative for polydipsia  Genitourinary: Negative for difficulty urinating  Musculoskeletal: Positive for arthralgias  Skin: Negative for rash  Allergic/Immunologic: Negative for immunocompromised state  Neurological: Negative for numbness  Hematological: Does not bruise/bleed easily  Psychiatric/Behavioral: Negative for confusion  No red flag symptoms including fever/chills, unintentional weight change, bowel/bladder incontinence, scissoring gait, personal/family history of cancer, pain worse at night, intravenous drug abuse or trauma  Following history reviewed and updated:  Past Medical History:   Diagnosis Date    Anxiety     Fibromyalgia     Medical history unknown     Osteopenia      Past Surgical History:   Procedure Laterality Date    CARPAL TUNNEL RELEASE Bilateral     21 y o  with left ulnar nerve release        CARPAL TUNNEL RELEASE Bilateral      SECTION      ELBOW SURGERY      Tennis elbow    GASTRIC BYPASS      REDUCTION MAMMAPLASTY Bilateral     WISDOM TOOTH EXTRACTION       Social History   Social History     Substance and Sexual Activity   Alcohol Use No     Social History     Substance and Sexual Activity   Drug Use No     Social History     Tobacco Use   Smoking Status Never Smoker   Smokeless Tobacco Never Used     Family History   Problem Relation Age of Onset    Ovarian cancer Mother     Heart disease Mother     Colon cancer Father     No Known Problems Maternal Grandmother     No Known Problems Maternal Grandfather     Diabetes Paternal Grandmother     No Known Problems Paternal Grandfather     No Known Problems Son     Leukemia Maternal Uncle     Diabetes Paternal Aunt     Diabetes Paternal Uncle      No Known Allergies     Constitutional:  /96   Pulse 92   Ht 5' 1" (1 549 m)   Wt 89 8 kg (198 lb)   BMI 37 41 kg/m²    General: NAD  Eyes: Anicteric sclerae  Neck: Supple  Lungs: Unlabored breathing  Cardiovascular: No lower extremity edema  Skin: Intact without erythema  Neurologic: Sensation intact to light touch  Psychiatric: Mood and affect are appropriate  Orthopedic Examination   Inspection: No obvious deformities, lesions or rashes   ROM: Within normal limits  Limited with hip internal rotation secondary to pain   Palpation:  Nontender to palpation both axially and peripherally  There are no step offs   Neuro: Bilateral extremity strength is normal and symmetric  No muscle atrophy or abnormal tone  Bilateral extremity muscle stretch reflexes are physiologic and symmetric   No myelopathic signs  Sensation to light touch is intact throughout  Neural compression testing: Normal bilateral SLR/dural stretch  Positive bilateral Zavala's maneuver     Gait is normal   Positive logroll maneuver, Stinchfield test and hip scour maneuver    Procedures

## 2021-04-28 ENCOUNTER — APPOINTMENT (OUTPATIENT)
Dept: PHYSICAL THERAPY | Facility: CLINIC | Age: 56
End: 2021-04-28
Payer: COMMERCIAL

## 2021-04-28 ENCOUNTER — TELEPHONE (OUTPATIENT)
Dept: OBGYN CLINIC | Facility: HOSPITAL | Age: 56
End: 2021-04-28

## 2021-04-28 NOTE — TELEPHONE ENCOUNTER
Patient sees Dr Raymond Silver  Patients insurance [employer] is calling in wanting to make sure that we accept the patients insurance

## 2021-05-11 ENCOUNTER — APPOINTMENT (OUTPATIENT)
Dept: LAB | Facility: HOSPITAL | Age: 56
End: 2021-05-11
Payer: COMMERCIAL

## 2021-05-11 DIAGNOSIS — E53.8 B12 DEFICIENCY: ICD-10-CM

## 2021-05-11 DIAGNOSIS — E61.1 IRON DEFICIENCY: ICD-10-CM

## 2021-05-11 DIAGNOSIS — E55.9 VITAMIN D DEFICIENCY: ICD-10-CM

## 2021-05-11 LAB
25(OH)D3 SERPL-MCNC: 70.3 NG/ML (ref 30–100)
FERRITIN SERPL-MCNC: 245 NG/ML (ref 8–388)
VIT B12 SERPL-MCNC: 1851 PG/ML (ref 100–900)

## 2021-05-11 PROCEDURE — 36415 COLL VENOUS BLD VENIPUNCTURE: CPT

## 2021-05-11 PROCEDURE — 82607 VITAMIN B-12: CPT

## 2021-05-11 PROCEDURE — 82728 ASSAY OF FERRITIN: CPT

## 2021-05-11 PROCEDURE — 82306 VITAMIN D 25 HYDROXY: CPT

## 2021-05-18 ENCOUNTER — OFFICE VISIT (OUTPATIENT)
Dept: BARIATRICS | Facility: CLINIC | Age: 56
End: 2021-05-18
Payer: COMMERCIAL

## 2021-05-18 VITALS
BODY MASS INDEX: 36.44 KG/M2 | DIASTOLIC BLOOD PRESSURE: 70 MMHG | HEART RATE: 58 BPM | HEIGHT: 61 IN | WEIGHT: 193 LBS | TEMPERATURE: 97.6 F | SYSTOLIC BLOOD PRESSURE: 122 MMHG

## 2021-05-18 DIAGNOSIS — E66.9 OBESITY, CLASS II, BMI 35-39.9: Primary | ICD-10-CM

## 2021-05-18 DIAGNOSIS — E61.1 IRON DEFICIENCY: ICD-10-CM

## 2021-05-18 DIAGNOSIS — K91.2 POSTSURGICAL MALABSORPTION: ICD-10-CM

## 2021-05-18 DIAGNOSIS — Z98.84 H/O GASTRIC BYPASS: ICD-10-CM

## 2021-05-18 DIAGNOSIS — K59.00 CONSTIPATION, UNSPECIFIED CONSTIPATION TYPE: ICD-10-CM

## 2021-05-18 DIAGNOSIS — Z48.815 ENCOUNTER FOR SURGICAL AFTERCARE FOLLOWING SURGERY OF DIGESTIVE SYSTEM: ICD-10-CM

## 2021-05-18 PROBLEM — E66.812 OBESITY, CLASS II, BMI 35-39.9: Status: ACTIVE | Noted: 2021-05-18

## 2021-05-18 PROCEDURE — 99204 OFFICE O/P NEW MOD 45 MIN: CPT | Performed by: PHYSICIAN ASSISTANT

## 2021-05-18 NOTE — ASSESSMENT & PLAN NOTE
-s/p Shawn-En-Y Gastric Bypass with Dr Lilly Beckford  Approximately 2010  Overall doing Fair  Initial:222 lb-per pt  Current:196 lb    Charlie:157 lb-around 1 1/2 years post-op  Current BMI is Body mass index is 36 77 kg/m²      Tolerating a regular diet-yes  Eating at least 60 grams of protein per day-variable  Following 30/60 minute rule with liquids-no  Drinking at least 64 ounces of fluid per day-yes  Drinking carbonated beverages-yes/regular soda daily  Sufficient exercise-limited with left hip pain and fibromyalgia per patient  Using NSAIDs regularly-yes/aleve-3-4 times per week  Using nicotine-no  Using alcohol-yes/a glass of wine twice monthly    Colonsocopy/colon cancer screening is up-to-date as reported by patient

## 2021-05-18 NOTE — PATIENT INSTRUCTIONS
It was great to meet you today  I would recommend that you add one regular multivitamin with iron daily and 2-500 mg calcium citrate with D per day-space the calcium at least 2 hours apart from the multivitamin and 2 hours from each other  (When you see your PCP you may be able to decrease your vitamin D supplement)    Follow constipation guidelines-if not effective then follow-up with gastroenterology or your PCP    Review medical issues with your PCP    Follow-up with RD/ now and then after that can consider follow-up with medical weigh management providers if desired  Follow-up in one year  We kindly ask that you arrive 15 minutes before your scheduled appointment time with your provider to allow you to be roomed, have your vital signs checked and your chart updated by our staff  We thank you for your patience at your visit  Your appointment time is reserved only for you  If you are unable to make your scheduled visit, we would request that you call to cancel and reschedule it at your earliest convenience  Follow diet as discussed  Follow  vitamin and mineral recommendations as reviewed with you  Bariatric vitamins are highly recommended  Vitamins are important for a life-time  Low levels can lead to deficiency which can lead to other medical problems  Exercise as tolerated    If you have gotten a lab slip at this visit, please note that most labs are FASTING-but you need to drink water the night before and the morning before your labs are done  It is HIGHLY RECOMMENDED that you check with your insurance to make sure all the labs ordered are covered by your individual insurance policy  This is especially important if you also get labs done by other providers outside of [facility]  You want to avoid having duplicate labs done  Note you will be given a lab slip AFTER  your annual visit next year to check your vitamin and mineral levels    You will not need to make a second appointment after the labs are received/reviewed  You will receive a letter/and or phone call with your results  Most labs do NOT honor a lab slip dated one year in advance now  IMPORTANT if you have a [facility]" account, you will receive a letter of your vitamin/mineral results through the computer  Please watch for an update to your chart-since recommendations for supplement adjustments will be sent to you this way  Call our office if he have any problems with abdominal pain especially if associated with fever, chills, nausea, vomiting or any other concerns  All  Post-bariatric surgery patients should be aware that very small quantities of any alcohol  can cause impairment and it is very possible not to feel the effect  The effect can be in the system for several hours  It is also a stomach irritant  It is advised to AVOID alcohol, Nonsteroidal antiinflammatory drugs (NSAIDS) and nicotine of all forms   Any of these can cause stomach irritation/pain

## 2021-05-18 NOTE — ASSESSMENT & PLAN NOTE
She notes over past few months she lost 3-4 lb  But otherwise relatively stable with her current weight    She is interested in Medical weight management  I spent time reviewing her diet and advising her on same  I provided her with our bariatric manual and emphasized reviewing Chapter 2 of the manual and lesson plans  Will refer her to RD/ to establish with our practice  She can continue to work with them or consider follow-up with our medical weight management providers if she desires      She is agreeable to the plan 1

## 2021-05-18 NOTE — ASSESSMENT & PLAN NOTE
Reports she is up-to-date with colonoscopies but has chronic constipation and only moves her bowels a couple times/month  Advised on use of warm water enema until effective and then start using miralax daily   Advised if this is ineffective then she should follow-up with GI/PCP for further management    She denies any black/tarry or bloody stools

## 2021-05-18 NOTE — ASSESSMENT & PLAN NOTE
Malabsorption- patient is at risk for malabsorption of vitamins/minerals secondary to malabsorption from her procedure and restriction of intakes  Reviewed current supplements and advised on same    Taking 50,000 IU vitamin D3 weekly  And takes 1000 mcg vitamin B12 and an additional weekly vitamin b12  No other supplements    She had a few vitamin levels recently checked/reviewed today    Gave her written recommendations on vitamin supplements she should be taking    Will order the remainder of routine labs now

## 2021-05-18 NOTE — PROGRESS NOTES
Assessment/Plan:    Obesity, Class II, BMI 35-39 9  She notes over past few months she lost 3-4 lb  But otherwise relatively stable with her current weight    She is interested in Medical weight management  I spent time reviewing her diet and advising her on same  I provided her with our bariatric manual and emphasized reviewing Chapter 2 of the manual and lesson plans  Will refer her to RD/ to establish with our practice  She can continue to work with them or consider follow-up with our medical weight management providers if she desires  She is agreeable to the plan    Encounter for surgical aftercare following surgery of digestive system  -s/p Shawn-En-Y Gastric Bypass with Dr Terence Langford  Approximately 2010  Overall doing Fair  Initial:222 lb-per pt  Current:196 lb    Charlie:157 lb-around 1 1/2 years post-op  Current BMI is Body mass index is 36 77 kg/m²  Tolerating a regular diet-yes  Eating at least 60 grams of protein per day-variable  Following 30/60 minute rule with liquids-no  Drinking at least 64 ounces of fluid per day-yes  Drinking carbonated beverages-yes/regular soda daily  Sufficient exercise-limited with left hip pain and fibromyalgia per patient  Using NSAIDs regularly-yes/aleve-3-4 times per week  Using nicotine-no  Using alcohol-yes/a glass of wine twice monthly    Colonsocopy/colon cancer screening is up-to-date as reported by patient        Postsurgical malabsorption  Malabsorption- patient is at risk for malabsorption of vitamins/minerals secondary to malabsorption from her procedure and restriction of intakes  Reviewed current supplements and advised on same    Taking 50,000 IU vitamin D3 weekly  And takes 1000 mcg vitamin B12 and an additional weekly vitamin b12  No other supplements    She had a few vitamin levels recently checked/reviewed today    Gave her written recommendations on vitamin supplements she should be taking    Will order the remainder of routine labs now    Iron deficiency  Followed by hematology-last IV iron was in April 2021 per pt    Constipation  Reports she is up-to-date with colonoscopies but has chronic constipation and only moves her bowels a couple times/month  Advised on use of warm water enema until effective and then start using miralax daily  Advised if this is ineffective then she should follow-up with GI/PCP for further management    She denies any black/tarry or bloody stools       Diagnoses and all orders for this visit:    Obesity, Class II, BMI 35-39 9  -     Folate; Future  -     PTH, intact; Future  -     Zinc; Future  -     Vitamin B1, whole blood; Future  -     Vitamin A; Future    Encounter for surgical aftercare following surgery of digestive system  -     Folate; Future  -     PTH, intact; Future  -     Zinc; Future  -     Vitamin B1, whole blood; Future  -     Vitamin A; Future    Postsurgical malabsorption  -     Folate; Future  -     PTH, intact; Future  -     Zinc; Future  -     Vitamin B1, whole blood; Future  -     Vitamin A; Future    Iron deficiency  -     Folate; Future  -     PTH, intact; Future  -     Zinc; Future  -     Vitamin B1, whole blood; Future  -     Vitamin A; Future    Constipation, unspecified constipation type  -     Folate; Future  -     PTH, intact; Future  -     Zinc; Future  -     Vitamin B1, whole blood; Future  -     Vitamin A; Future    H/O gastric bypass  -     Ambulatory referral to Weight Management  -     Folate; Future  -     PTH, intact; Future  -     Zinc; Future  -     Vitamin B1, whole blood; Future  -     Vitamin A; Future          Subjective:      Patient ID: Maru Ya is a 64 y o  female  64year old female status post laparoscopic surjit-en-y gastric bypass surgery by Dr Ne Batista around 10 years ago  Was lost to follow-up with her surgeon's office after initial post-op visit  She is here to establish for routine post -op bariatric surgery follow-up and wants help losing around 30 lb  She is interested in medical weight management/not interested in surgery at this time  She is taking vitamin b12 and vitamin D supplements but no others  She has limited exercise with left hip pain and fibromyalgia  The following portions of the patient's history were reviewed and updated as appropriate: allergies, current medications, past family history, past medical history, past social history, past surgical history and problem list     Review of Systems   Constitutional: Negative for chills, fatigue, fever and unexpected weight change  HENT: Positive for dental problem  Negative for hearing loss, mouth sores, nosebleeds and trouble swallowing  Has missing teeth   Eyes:        No night vision problems   Respiratory: Negative for cough, shortness of breath and wheezing  Cardiovascular: Positive for palpitations  Negative for chest pain  Intermittent seconds of palpitations at times-encouraged to review with PCP   Gastrointestinal: Positive for constipation  Negative for abdominal pain, blood in stool, diarrhea, nausea and vomiting  Endocrine: Positive for heat intolerance  Genitourinary: Negative for difficulty urinating and urgency  Musculoskeletal: Positive for arthralgias, back pain, gait problem, myalgias and neck pain  Skin: Positive for rash  Notes intermittent invetrigo   Allergic/Immunologic: Negative for environmental allergies and food allergies  Neurological: Positive for numbness  Negative for light-headedness and headaches  Intermittent hand numbness with history of carpal tunnel surgery   Hematological: Negative for adenopathy  Bruises/bleeds easily  Easily bruising and bleeding   Psychiatric/Behavioral: Negative for suicidal ideas          Prone to anxiety and depression         Objective:      /70 (BP Location: Left arm, Patient Position: Sitting)   Pulse 58   Temp 97 6 °F (36 4 °C) (Tympanic)   Ht 5' 0 75" (1 543 m)   Wt 87 5 kg (193 lb)   BMI 36 77 kg/m²          Physical Exam  Constitutional:       General: She is not in acute distress  Appearance: She is obese  She is not ill-appearing  HENT:      Mouth/Throat:      Mouth: Mucous membranes are moist    Eyes:      General: No scleral icterus  Conjunctiva/sclera: Conjunctivae normal       Comments: bilateral   Cardiovascular:      Rate and Rhythm: Normal rate and regular rhythm  Heart sounds: Normal heart sounds  Pulmonary:      Effort: Pulmonary effort is normal  No respiratory distress  Breath sounds: No wheezing, rhonchi or rales  Abdominal:      General: Bowel sounds are normal  There is no distension  Palpations: Abdomen is soft  Tenderness: There is no abdominal tenderness  There is no right CVA tenderness, left CVA tenderness, guarding or rebound  Hernia: No hernia is present  Lymphadenopathy:      Cervical: No cervical adenopathy  Skin:     General: Skin is warm and dry  Neurological:      Mental Status: She is alert and oriented to person, place, and time     Psychiatric:         Mood and Affect: Mood normal

## 2021-06-08 DIAGNOSIS — E53.8 B12 DEFICIENCY: ICD-10-CM

## 2021-06-08 RX ORDER — CHOLECALCIFEROL (VITAMIN D3) 125 MCG
CAPSULE ORAL
Qty: 60 TABLET | Refills: 2 | Status: SHIPPED | OUTPATIENT
Start: 2021-06-08 | End: 2021-09-13

## 2021-06-15 ENCOUNTER — OFFICE VISIT (OUTPATIENT)
Dept: OBGYN CLINIC | Facility: CLINIC | Age: 56
End: 2021-06-15
Payer: COMMERCIAL

## 2021-06-15 VITALS — BODY MASS INDEX: 36.44 KG/M2 | HEIGHT: 61 IN | WEIGHT: 193 LBS

## 2021-06-15 DIAGNOSIS — M54.42 CHRONIC LEFT-SIDED LOW BACK PAIN WITH LEFT-SIDED SCIATICA: ICD-10-CM

## 2021-06-15 DIAGNOSIS — G89.29 CHRONIC LEFT-SIDED LOW BACK PAIN WITH LEFT-SIDED SCIATICA: ICD-10-CM

## 2021-06-15 DIAGNOSIS — M25.552 PAIN IN LEFT HIP: Primary | ICD-10-CM

## 2021-06-15 PROCEDURE — 99213 OFFICE O/P EST LOW 20 MIN: CPT | Performed by: PHYSICAL MEDICINE & REHABILITATION

## 2021-06-15 PROCEDURE — 1036F TOBACCO NON-USER: CPT | Performed by: PHYSICAL MEDICINE & REHABILITATION

## 2021-06-15 PROCEDURE — 3008F BODY MASS INDEX DOCD: CPT | Performed by: PHYSICAL MEDICINE & REHABILITATION

## 2021-06-15 PROCEDURE — 20611 DRAIN/INJ JOINT/BURSA W/US: CPT | Performed by: PHYSICAL MEDICINE & REHABILITATION

## 2021-06-15 RX ORDER — LIDOCAINE HYDROCHLORIDE 10 MG/ML
3 INJECTION, SOLUTION INFILTRATION; PERINEURAL
Status: COMPLETED | OUTPATIENT
Start: 2021-06-15 | End: 2021-06-15

## 2021-06-15 RX ORDER — TRIAMCINOLONE ACETONIDE 40 MG/ML
40 INJECTION, SUSPENSION INTRA-ARTICULAR; INTRAMUSCULAR
Status: COMPLETED | OUTPATIENT
Start: 2021-06-15 | End: 2021-06-15

## 2021-06-15 RX ADMIN — TRIAMCINOLONE ACETONIDE 40 MG: 40 INJECTION, SUSPENSION INTRA-ARTICULAR; INTRAMUSCULAR at 12:42

## 2021-06-15 RX ADMIN — LIDOCAINE HYDROCHLORIDE 3 ML: 10 INJECTION, SOLUTION INFILTRATION; PERINEURAL at 12:42

## 2021-06-15 NOTE — PROGRESS NOTES
1  Pain in left hip     2  Chronic left-sided low back pain with left-sided sciatica       Orders Placed This Encounter   Procedures    Large joint arthrocentesis        Impression:  Patient is here in follow up of chronic left-sided low-back pain that is multifactorial and is predominantly due to left hip joint derangement and greater trochanteric pain syndrome  Also possible that this is due to lumbar spine etiology  There are no concerning neurological deficits  We obtained hip x-rays as below which are normal   Her hip joint pain is possibly due to labral derangement  We discussed different treatment options and proceed with a an ultrasound-guided left hip joint injection which provided her immediate relief  I will see her back in four weeks to reassess  If she continues to have pain, can consider an MRI of her lumbar spine  If she continues to have right hip pain, can consider working that up  Imaging Studies (I personally reviewed images in PACS and report):  Lumbar spine x-rays that are most recent to this encounter were reviewed  These images show   "There are 5 non rib bearing lumbar vertebral bodies       There is no evidence of acute fracture or destructive osseous lesion      Mild dextroconvex scoliosis       There are degenerative changes in the facet joints of the lower lumbar spine and in the disc spaces of the lower thoracic and upper lumbar spine      The pedicles appear intact  No pars defects   SI joints appear normal      There are nonspecific calcifications in the left flank area not clearly related to the left kidney      IMPRESSION:     No acute osseous abnormality        Degenerative changes as described      Scoliosis      Nonspecific left flank calcification of uncertain etiology  "     Left hip x-rays obtained on 4/27/2021  These images show preserved left hip joint space  No acute osseous abnormalities    Return in about 4 weeks (around 7/13/2021)      HPI:  Uriel Jaramillo is a 64 y o  female  who presents in follow up  Here for   Chief Complaint   Patient presents with    Left Hip - Follow-up, Pain       Date of injury:  Chronic  Trajectory of symptoms:  No changes  Review of Systems   Constitutional: Positive for activity change  Negative for fever  HENT: Negative for sore throat and trouble swallowing  Eyes: Negative for visual disturbance  Respiratory: Negative for shortness of breath  Cardiovascular: Negative for chest pain  Gastrointestinal: Negative for abdominal pain  Denies bowel incontinence  Endocrine: Negative for polydipsia  Genitourinary: Negative for difficulty urinating  Denies urinary incontinence  Musculoskeletal: Positive for arthralgias and back pain  Negative for gait problem  Skin: Negative for rash  Allergic/Immunologic: Negative for immunocompromised state  Neurological: Negative for weakness and numbness  Denies saddle anesthesia  Hematological: Does not bruise/bleed easily  Psychiatric/Behavioral: Negative for confusion  Following history reviewed and updated:  Past Medical History:   Diagnosis Date    Anxiety     Fibromyalgia     Medical history unknown     Osteopenia      Past Surgical History:   Procedure Laterality Date    CARPAL TUNNEL RELEASE Bilateral     21 y o  with left ulnar nerve release        CARPAL TUNNEL RELEASE Bilateral      SECTION      ELBOW SURGERY      Tennis elbow    GASTRIC BYPASS      REDUCTION MAMMAPLASTY Bilateral     WISDOM TOOTH EXTRACTION       Social History   Social History     Substance and Sexual Activity   Alcohol Use No     Social History     Substance and Sexual Activity   Drug Use No     Social History     Tobacco Use   Smoking Status Never Smoker   Smokeless Tobacco Never Used     Family History   Problem Relation Age of Onset    Ovarian cancer Mother     Heart disease Mother     Colon cancer Father     No Known Problems Maternal Grandmother     No Known Problems Maternal Grandfather     Diabetes Paternal Grandmother     No Known Problems Paternal Grandfather     No Known Problems Son     Leukemia Maternal Uncle     Diabetes Paternal Aunt     Diabetes Paternal Uncle      No Known Allergies     Constitutional:  Ht 5' 0 75" (1 543 m)   Wt 87 5 kg (193 lb)   BMI 36 77 kg/m²    General: NAD  Eyes: Clear sclerae  ENT: No inflammation, lesion, or mass of lips  No tracheal deviation  Musculoskeletal: As mentioned below  Integumentary: No visible rashes or skin lesions  Pulmonary/Chest: Effort normal  No respiratory distress  Neuro: CN's grossly intact, LU  Psych: Normal affect and judgement  Vascular: WWP  Left Hip Exam     Tenderness   The patient is experiencing tenderness in the anterior  Range of Motion   Internal rotation: abnormal     Comments:  Injection site was CDI  Large joint arthrocentesis: L hip joint  Universal Protocol:  Consent given by: patient  Timeout called at: 6/15/2021 12:41 PM   Site marked: the operative site was marked  Supporting Documentation  Indications: pain   Procedure Details  Location: hip - L hip joint  Ultrasound guidance: yes (US guidance was used to find the area of interest )  Medications administered: 40 mg triamcinolone acetonide 40 mg/mL; 3 mL lidocaine 1 %    Patient tolerance: patient tolerated the procedure well with no immediate complications  Dressing:  Sterile dressing applied    The left hip joint was visualized with ultrasound and injected with steroid/anesthetic solution as indicated  Prior to the injection, the ultrasound was used to evaluate for any neural or vascular structures  Care was taken to avoid these structures  The images (and video if taken) were saved to the [employer] ultrasound system  Prior to the procedure, the patient was informed of the following risks in layman terms:    - Risk of bleeding since a needle is involved    - Risk of infection (1/10,000 chance as per recent studies)  Signs/symptoms were discussed and they would prompt an urgent evaluation at an emergency department   - Risk of pigmentation or skin dimpling in the skin (2-3% chance as per recent studies) from the steroid  - Risk of increased pain from steroid flare (1% chance as per recent studies) that typically lasts 24-48 hours  - Risk of increased blood sugars from the steroid medication that can last for a few weeks  If the patient is a diabetic or pre-diabetic, they were encouraged to closely monitor their blood sugars and discuss with PCP if elevated more than usual or if having symptoms  After going over these risks, we decided that the benefits outweigh the risks and proceeded with the procedure

## 2021-07-06 NOTE — PROGRESS NOTES
Assessment and Plan:   Patient is a 59-year-old female with longstanding chronic pain issues who presents for rheumatology consult  She previously saw Rheumatology within the last few years and available records were reviewed  She had prior workup a few years ago which was grossly negative for any concerning rheumatologic markers including LANDON, RF, CCP, SSA and SSB, ESR and CRP, and CPK  She describes widespread pain issues that are not improved by rest or activity and does not describe any features concerning for development of an inflammatory arthropathy  Patient is restless at the visit today due to her pain and has exam very consistent with chronic pain syndrome with fibromyalgia  She also reports longstanding lower back pain issues and would like to see pain management  She otherwise has no concerning symptoms or signs of a rheumatologic etiology for her pain and we discussed this  She will follow up with Orthopedics and also pain management  She does not need additional Rheumatology follow-up  Plan:  Diagnoses and all orders for this visit:    Fibromyalgia    Chronic pain of multiple joints    Lumbar facet arthropathy  -     Ambulatory referral to Pain Management; Future    Bilateral hip pain  -     Ambulatory referral to Pain Management; Future      Follow-up plan: no rheumatology follow-up needed      SHYANNE Bradford is a 64 y o   female with depression, anxiety, s/p gastric bypass, vitamin B12 deficiency, vitamin-D deficiency, iron deficiency anemia, H/O COVID-19, osteopenia who presents for rheumatology consult by request of Dr Mitali Roth for fibromyalgia  She previously was following with Dr Nehemiah Pope for her fibromyalgia and chronic back pain, records were reviewed and she was last seen about 1 year ago  She was previously on treatment with [facility] but apparently this did not help and she came off of this    States she has had chronic pain for several years, cannot tell me when it started  "My whole body hurts "   Cannot tell me where she has more pain, just has widespread pain  She has chronic back pain and bilateral hip pain  She is seeing Orthopedics for this and reports they cannot figure out the source of her pain but she thinks it is from her back  She has done aqua therapy and felt this did help but then had to stop due to limited visits allowed through insurance  Swelling in the ankles, hands  She gets rashes when she is stressed out  Currently taking aleve for pain  Cannot recall the other fibromyalgia medication she tried in the past   Denies photosensitivity, rashes, psoriasis, sicca symptoms, oral or nasal ulcers, alopecia, Raynaud's, h/o pericarditis or pleurisy, h/o blood clots  We reviewed her lab workup from 2016 which was negative for autoimmune markers including for RA, and Sjogren's  Review of Systems  Review of Systems   Constitutional: Positive for fatigue  Negative for chills, fever and unexpected weight change  HENT: Negative for mouth sores and trouble swallowing  Eyes: Negative for pain and visual disturbance  Respiratory: Negative for cough and shortness of breath  Cardiovascular: Negative for chest pain and leg swelling  Gastrointestinal: Negative for abdominal pain, blood in stool, constipation, diarrhea and nausea  Musculoskeletal: Positive for arthralgias, myalgias and neck pain  Negative for back pain and joint swelling  Skin: Negative for color change and rash  Neurological: Negative for weakness and numbness  Hematological: Negative for adenopathy  Psychiatric/Behavioral: Positive for sleep disturbance  The patient is nervous/anxious          Allergies  No Known Allergies    Home Medications    Current Outpatient Medications:     doxepin (SINEquan) 75 MG capsule, TAKE 1 CAP AT BEDTIME FOR 1 WEEK THEN INCREASE TO 2 CAPSULES AFTER 1 WEEK, Disp: 180 capsule, Rfl: 1    ergocalciferol (VITAMIN D2) 50,000 units, TAKE 1 CAPSULE BY MOUTH ONE TIME PER WEEK, Disp: 12 capsule, Rfl: 0    vitamin B-12 (VITAMIN B-12) 500 mcg tablet, TAKE 2 TABLETS (1,000 MCG TOTAL) BY MOUTH DAILY, Disp: 60 tablet, Rfl: 2    Past Medical History  Past Medical History:   Diagnosis Date    Anxiety     Fibromyalgia     Medical history unknown     Osteopenia        Past Surgical History   Past Surgical History:   Procedure Laterality Date    CARPAL TUNNEL RELEASE Bilateral     21 y o  with left ulnar nerve release   CARPAL TUNNEL RELEASE Bilateral      SECTION      ELBOW SURGERY      Tennis elbow    GASTRIC BYPASS      REDUCTION MAMMAPLASTY Bilateral     WISDOM TOOTH EXTRACTION         Family History  No known family history of autoimmune or inflammatory diseases  Family History   Problem Relation Age of Onset    Ovarian cancer Mother     Heart disease Mother     Colon cancer Father     No Known Problems Maternal Grandmother     No Known Problems Maternal Grandfather     Diabetes Paternal Grandmother     No Known Problems Paternal Grandfather     No Known Problems Son     Leukemia Maternal Uncle     Diabetes Paternal Aunt     Diabetes Paternal Uncle        Social History  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, 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   Social History     Substance and Sexual Activity   Alcohol Use No     Social History     Substance and Sexual Activity   Drug Use Never     Social History     Tobacco Use   Smoking Status Never Smoker   Smokeless Tobacco Never Used       Objective:    Vitals:    21 0758   Weight: 84 6 kg (186 lb 6 4 oz)   Height: 5' 1" (1 549 m)       Physical Exam  Constitutional:       General: She is not in acute distress  Appearance: She is well-developed  HENT:      Head: Normocephalic and atraumatic  Eyes:      General: Lids are normal  No scleral icterus       Conjunctiva/sclera: Conjunctivae normal    Pulmonary:      Effort: Pulmonary effort is normal  No tachypnea, accessory muscle usage or respiratory distress  Musculoskeletal:      Cervical back: Neck supple  Comments: No joint swelling or synovitis anywhere  Diffuse reproducible soft tissue tenderness with majority of fibromyalgia tender points present  Skin:     General: Skin is dry  Findings: No rash  Neurological:      Mental Status: She is alert  Psychiatric:         Attention and Perception: She is inattentive  Mood and Affect: Mood is anxious  Affect is tearful  Behavior: Behavior is cooperative        Comments: Difficulty concentrating         Imaging:   XR left hip 4/27/21:  Images personally reviewed in PACS and my impression is:  Grossly normal, no significant degenerative changes    XR lumbar 1/25/21:  DJD/DDD lumbar spine    Labs:    Ref Range & Units 4 yr ago Comments   Proteinase 3 Ab <20 U/mL 4 4          Myeloperoxidase Ab <20 U/mL 2 1        Ref Range & Units 4 yr ago   Complement, C3 90 - 180 mg/dL 116       Ref Range & Units 4 yr ago   Complement, C4 10 0 - 40 0 mg/dL 31 9       Ref Range & Units 4 yr ago Comments   Cyclic Citrullinated Peptide Ab (IgG) <20 U/mL 9 1        Ref Range & Units 4 yr ago   Rheumatoid Factor <15 IU/mL <10       Ref Range & Units 4 yr ago Comments   SSA (Ro) AutoAb <20 BRENDAN Unit <20          SSB (La) AutoAb <20 BRENDAN Unit <20        Ref Range & Units 4 yr ago   Sed Rate 0 - 30 mm/hr 5    (LAB)   Ref Range & Units 4 yr ago   Antinuclear Abs <40 titer Negative       Ref Range & Units 4 yr ago   C-Reactive Protein <7 0 mg/L <3 0         Ref Range & Units 4 yr ago   CK, Total <201 U/L 80       Ref Range & Units 4 yr ago Comments   HCV Ab, EIA Screen NEG  Negative         Component      Latest Ref Rng & Units 5/11/2021   Ferritin      8 - 388 ng/mL 245   Vit D, 25-Hydroxy      30 0 - 100 0 ng/mL 70 3   Vitamin B-12      100 - 900 pg/mL 1,851 (H)     Component      Latest Ref Rng & Units 1/18/2021   WBC      4 31 - 10 16 Thousand/uL 5 48   Red Blood Cell Count      3 81 - 5 12 Million/uL 4 10   Hemoglobin      11 5 - 15 4 g/dL 12 7   HCT      34 8 - 46 1 % 39 3   MCV      82 - 98 fL 96   MCH      26 8 - 34 3 pg 31 0   MCHC      31 4 - 37 4 g/dL 32 3   RDW      11 6 - 15 1 % 11 2 (L)   MPV      8 9 - 12 7 fL 9 9   Platelet Count      788 - 390 Thousands/uL 236   nRBC      /100 WBCs 0   Neutrophils %      43 - 75 % 62   Immat GRANS %      0 - 2 % 0   Lymphocytes Relative      14 - 44 % 25   Monocytes Relative      4 - 12 % 9   Eosinophils      0 - 6 % 3   Basophils Relative      0 - 1 % 1   Absolute Neutrophils      1 85 - 7 62 Thousands/µL 3 43   Immature Grans Absolute      0 00 - 0 20 Thousand/uL 0 01   Lymphocytes Absolute      0 60 - 4 47 Thousands/µL 1 35   Absolute Monocytes      0 17 - 1 22 Thousand/µL 0 50   Absolute Eosinophils      0 00 - 0 61 Thousand/µL 0 15   Basophils Absolute      0 00 - 0 10 Thousands/µL 0 04   Sodium      136 - 145 mmol/L 139   Potassium      3 5 - 5 3 mmol/L 4 3   Chloride      100 - 108 mmol/L 103   CO2      21 - 32 mmol/L 28   Anion Gap      4 - 13 mmol/L 8   BUN      5 - 25 mg/dL 10   Creatinine      0 60 - 1 30 mg/dL 0 71   GLUCOSE FASTING      65 - 99 mg/dL 96   Calcium      8 3 - 10 1 mg/dL 8 6   AST      5 - 45 U/L 18   ALT      12 - 78 U/L 20   Alkaline Phosphatase      46 - 116 U/L 76   Total Protein      6 4 - 8 2 g/dL 7 4   Albumin      3 5 - 5 0 g/dL 3 5   TOTAL BILIRUBIN      0 20 - 1 00 mg/dL 0 26   eGFR      ml/min/1 73sq m 96   Vit D, 25-Hydroxy      30 0 - 100 0 ng/mL 28 5 (L)   Ferritin      8 - 388 ng/mL 10   Vitamin B-12      100 - 900 pg/mL 316   HEPATITIS C ANTIBODY      Non-reactive Non-reactive   HIV-1/2 AB-AG      Non-Reactive Non-Reactive

## 2021-07-09 ENCOUNTER — OFFICE VISIT (OUTPATIENT)
Dept: RHEUMATOLOGY | Facility: CLINIC | Age: 56
End: 2021-07-09
Payer: COMMERCIAL

## 2021-07-09 VITALS — BODY MASS INDEX: 35.19 KG/M2 | WEIGHT: 186.4 LBS | HEIGHT: 61 IN

## 2021-07-09 DIAGNOSIS — M47.816 LUMBAR FACET ARTHROPATHY: ICD-10-CM

## 2021-07-09 DIAGNOSIS — G89.29 CHRONIC PAIN OF MULTIPLE JOINTS: ICD-10-CM

## 2021-07-09 DIAGNOSIS — M25.552 BILATERAL HIP PAIN: ICD-10-CM

## 2021-07-09 DIAGNOSIS — M25.50 CHRONIC PAIN OF MULTIPLE JOINTS: ICD-10-CM

## 2021-07-09 DIAGNOSIS — M79.7 FIBROMYALGIA: Primary | ICD-10-CM

## 2021-07-09 DIAGNOSIS — M25.551 BILATERAL HIP PAIN: ICD-10-CM

## 2021-07-09 PROCEDURE — 99204 OFFICE O/P NEW MOD 45 MIN: CPT | Performed by: INTERNAL MEDICINE

## 2021-07-12 ENCOUNTER — TELEPHONE (OUTPATIENT)
Dept: PSYCHIATRY | Facility: CLINIC | Age: 56
End: 2021-07-12

## 2021-07-12 NOTE — TELEPHONE ENCOUNTER
Looking for therapist told her to get a referral from her [facility] PCP and she would be put on the wait list  Also suggested to call [employer] to get more name

## 2021-07-13 ENCOUNTER — OFFICE VISIT (OUTPATIENT)
Dept: OBGYN CLINIC | Facility: CLINIC | Age: 56
End: 2021-07-13
Payer: COMMERCIAL

## 2021-07-13 VITALS — WEIGHT: 187.4 LBS | HEIGHT: 61 IN | BODY MASS INDEX: 35.38 KG/M2

## 2021-07-13 DIAGNOSIS — M25.552 PAIN IN LEFT HIP: ICD-10-CM

## 2021-07-13 DIAGNOSIS — M79.7 FIBROMYALGIA: ICD-10-CM

## 2021-07-13 DIAGNOSIS — G89.29 CHRONIC LEFT-SIDED LOW BACK PAIN WITH LEFT-SIDED SCIATICA: Primary | ICD-10-CM

## 2021-07-13 DIAGNOSIS — M54.42 CHRONIC LEFT-SIDED LOW BACK PAIN WITH LEFT-SIDED SCIATICA: Primary | ICD-10-CM

## 2021-07-13 PROCEDURE — 1036F TOBACCO NON-USER: CPT | Performed by: PHYSICAL MEDICINE & REHABILITATION

## 2021-07-13 PROCEDURE — 3008F BODY MASS INDEX DOCD: CPT | Performed by: PHYSICAL MEDICINE & REHABILITATION

## 2021-07-13 PROCEDURE — 99214 OFFICE O/P EST MOD 30 MIN: CPT | Performed by: PHYSICAL MEDICINE & REHABILITATION

## 2021-07-13 RX ORDER — GABAPENTIN 100 MG/1
100 CAPSULE ORAL
Qty: 90 CAPSULE | Refills: 0 | Status: SHIPPED | OUTPATIENT
Start: 2021-07-13 | End: 2021-09-17

## 2021-07-13 RX ORDER — ASCORBIC ACID 500 MG
500 TABLET ORAL DAILY
COMMUNITY
End: 2022-06-28 | Stop reason: SDUPTHER

## 2021-07-13 NOTE — PROGRESS NOTES
1  Chronic left-sided low back pain with left-sided sciatica  MRI lumbar spine wo contrast    gabapentin (NEURONTIN) 100 mg capsule   2  Pain in left hip  MRI lumbar spine wo contrast   3  Fibromyalgia  MRI lumbar spine wo contrast    gabapentin (NEURONTIN) 100 mg capsule     Orders Placed This Encounter   Procedures    MRI lumbar spine wo contrast        Impression:  Patient is here in follow up of chronic left-sided low-back pain that is multifactorial and is predominantly due to left lumbar radiculopathy and fibromyalgia  The patient has slight pain related weakness with left ankle dorsiflexion  Patient had an ultrasound-guided left hip joint injection which only helped for about a day or so  Laverne Matias examination today is most consistent with a left lumbar radiculopathy  At this point, the patient has had physical therapy, ultrasound-guided injections, anti-inflammatories for quite some time now  At this juncture, we will obtain an MRI of her lumbar spine  I have also prescribed her gabapentin to help with neuropathic pain  I will see her back after the MRI  Imaging Studies (I personally reviewed images in PACS and report):  Lumbar spine x-rays that are most recent to this encounter were reviewed   These images show   "There are 5 non rib bearing lumbar vertebral bodies       There is no evidence of acute fracture or destructive osseous lesion      Mild dextroconvex scoliosis       There are degenerative changes in the facet joints of the lower lumbar spine and in the disc spaces of the lower thoracic and upper lumbar spine      The pedicles appear intact  No pars defects   SI joints appear normal      There are nonspecific calcifications in the left flank area not clearly related to the left kidney      IMPRESSION:     No acute osseous abnormality        Degenerative changes as described      Scoliosis      Nonspecific left flank calcification of uncertain etiology  "     Left hip x-rays obtained on on [place] images show preserved left hip joint space   No acute osseous abnormalities    No follow-ups on file  HPI:  Joe Anabaptist is a 64 y o  female  who presents in follow up  Here for   Chief Complaint   Patient presents with    Left Hip - Pain, Follow-up    Lower Back - Pain       Date of injury:  Chronic  Trajectory of symptoms:  See above  Patient is accompanied by nobody  Review of Systems   Constitutional: Positive for activity change  Negative for fever  HENT: Negative for sore throat  Eyes: Negative for visual disturbance  Respiratory: Negative for shortness of breath  Cardiovascular: Negative for chest pain  Gastrointestinal: Negative for abdominal pain  Endocrine: Negative for polydipsia  Genitourinary: Negative for difficulty urinating  Musculoskeletal: Positive for arthralgias  Skin: Negative for rash  Allergic/Immunologic: Negative for immunocompromised state  Neurological: Negative for numbness  Hematological: Does not bruise/bleed easily  Psychiatric/Behavioral: Negative for confusion  Following history reviewed and updated:  Past Medical History:   Diagnosis Date    Anxiety     Fibromyalgia     Medical history unknown     Osteopenia      Past Surgical History:   Procedure Laterality Date    CARPAL TUNNEL RELEASE Bilateral     21 y o  with left ulnar nerve release        CARPAL TUNNEL RELEASE Bilateral      SECTION      ELBOW SURGERY      Tennis elbow    GASTRIC BYPASS      REDUCTION MAMMAPLASTY Bilateral     WISDOM TOOTH EXTRACTION       Social History   Social History     Substance and Sexual Activity   Alcohol Use No     Social History     Substance and Sexual Activity   Drug Use Never     Social History     Tobacco Use   Smoking Status Never Smoker   Smokeless Tobacco Never Used     Family History   Problem Relation Age of Onset    Ovarian cancer Mother     Heart disease Mother     Colon cancer Father     No Known Problems Maternal Grandmother     No Known Problems Maternal Grandfather     Diabetes Paternal Grandmother     No Known Problems Paternal Grandfather     No Known Problems Son     Leukemia Maternal Uncle     Diabetes Paternal Aunt     Diabetes Paternal Uncle      No Known Allergies     Constitutional:  Ht 5' 1" (1 549 m)   Wt 85 kg (187 lb 6 4 oz)   BMI 35 41 kg/m²    General: NAD  Eyes: Clear sclerae  ENT: No inflammation, lesion, or mass of lips  No tracheal deviation  Musculoskeletal: As mentioned below  Integumentary: No visible rashes or skin lesions  Pulmonary/Chest: Effort normal  No respiratory distress  Neuro: CN's grossly intact, LU  Psych: Normal affect and judgement  Vascular: WWP  Back Exam     Tenderness   The patient is experiencing tenderness in the lumbar      Tests   Straight leg raise right: negative  Straight leg raise left: positive    Reflexes   Patellar: normal  Achilles: Hyporeflexic    Other   Sensation: normal             Procedures

## 2021-07-13 NOTE — PATIENT INSTRUCTIONS
You have been prescribed a medication called gabapentin  This is a medication that needs to be taken on a consistent basis to work optimally  It is not a medication that you take "as needed "      Typically, it takes 2-4 weeks to get adequate pain relief from taking gabapentin  Typical side effects can include drowsiness and tiredness  This is why it is started at night and slowly increased until your pain is controlled  Do not operate heavy machinery until you know that you are tolerating this medication without side effects  You will continue this medication as prescribed until your follow up visit  Your pharmacist will also go over this important information with you  We will see you back after the MRI to discuss the results and next steps

## 2021-07-18 DIAGNOSIS — E55.9 VITAMIN D DEFICIENCY: ICD-10-CM

## 2021-07-18 RX ORDER — ERGOCALCIFEROL 1.25 MG/1
CAPSULE ORAL
Qty: 4 CAPSULE | Refills: 2 | Status: SHIPPED | OUTPATIENT
Start: 2021-07-18 | End: 2021-07-22 | Stop reason: SDUPTHER

## 2021-07-21 ENCOUNTER — APPOINTMENT (OUTPATIENT)
Dept: LAB | Facility: HOSPITAL | Age: 56
End: 2021-07-21
Payer: COMMERCIAL

## 2021-07-21 DIAGNOSIS — E61.1 IRON DEFICIENCY: ICD-10-CM

## 2021-07-21 DIAGNOSIS — Z98.84 H/O GASTRIC BYPASS: ICD-10-CM

## 2021-07-21 DIAGNOSIS — E66.9 OBESITY, CLASS II, BMI 35-39.9: ICD-10-CM

## 2021-07-21 DIAGNOSIS — K59.00 CONSTIPATION, UNSPECIFIED CONSTIPATION TYPE: ICD-10-CM

## 2021-07-21 DIAGNOSIS — Z48.815 ENCOUNTER FOR SURGICAL AFTERCARE FOLLOWING SURGERY OF DIGESTIVE SYSTEM: ICD-10-CM

## 2021-07-21 DIAGNOSIS — K91.2 POSTSURGICAL MALABSORPTION: ICD-10-CM

## 2021-07-21 PROCEDURE — 84630 ASSAY OF ZINC: CPT

## 2021-07-21 PROCEDURE — 82746 ASSAY OF FOLIC ACID SERUM: CPT

## 2021-07-21 PROCEDURE — 36415 COLL VENOUS BLD VENIPUNCTURE: CPT

## 2021-07-21 PROCEDURE — 84425 ASSAY OF VITAMIN B-1: CPT

## 2021-07-21 PROCEDURE — 83970 ASSAY OF PARATHORMONE: CPT

## 2021-07-21 PROCEDURE — 84590 ASSAY OF VITAMIN A: CPT

## 2021-07-22 ENCOUNTER — OFFICE VISIT (OUTPATIENT)
Dept: FAMILY MEDICINE CLINIC | Facility: CLINIC | Age: 56
End: 2021-07-22
Payer: COMMERCIAL

## 2021-07-22 VITALS
RESPIRATION RATE: 18 BRPM | HEART RATE: 85 BPM | BODY MASS INDEX: 35.5 KG/M2 | OXYGEN SATURATION: 98 % | DIASTOLIC BLOOD PRESSURE: 80 MMHG | HEIGHT: 61 IN | WEIGHT: 188 LBS | SYSTOLIC BLOOD PRESSURE: 142 MMHG

## 2021-07-22 DIAGNOSIS — M79.7 FIBROMYALGIA: ICD-10-CM

## 2021-07-22 DIAGNOSIS — M54.42 CHRONIC LEFT-SIDED LOW BACK PAIN WITH LEFT-SIDED SCIATICA: ICD-10-CM

## 2021-07-22 DIAGNOSIS — E55.9 VITAMIN D DEFICIENCY: ICD-10-CM

## 2021-07-22 DIAGNOSIS — G89.29 CHRONIC LEFT-SIDED LOW BACK PAIN WITH LEFT-SIDED SCIATICA: ICD-10-CM

## 2021-07-22 DIAGNOSIS — K91.2 POSTSURGICAL MALABSORPTION: ICD-10-CM

## 2021-07-22 DIAGNOSIS — F41.9 ANXIETY: Primary | ICD-10-CM

## 2021-07-22 DIAGNOSIS — Z12.31 ENCOUNTER FOR SCREENING MAMMOGRAM FOR BREAST CANCER: ICD-10-CM

## 2021-07-22 LAB
FOLATE SERPL-MCNC: 13 NG/ML (ref 3.1–17.5)
PTH-INTACT SERPL-MCNC: 71.1 PG/ML (ref 18.4–80.1)

## 2021-07-22 PROCEDURE — 99214 OFFICE O/P EST MOD 30 MIN: CPT | Performed by: FAMILY MEDICINE

## 2021-07-22 PROCEDURE — 3725F SCREEN DEPRESSION PERFORMED: CPT | Performed by: FAMILY MEDICINE

## 2021-07-22 PROCEDURE — 1036F TOBACCO NON-USER: CPT | Performed by: FAMILY MEDICINE

## 2021-07-22 RX ORDER — ESCITALOPRAM OXALATE 10 MG/1
10 TABLET ORAL DAILY
Qty: 30 TABLET | Refills: 1 | Status: SHIPPED | OUTPATIENT
Start: 2021-07-22 | End: 2021-08-13

## 2021-07-22 RX ORDER — ERGOCALCIFEROL 1.25 MG/1
50000 CAPSULE ORAL
Qty: 4 CAPSULE | Refills: 2 | Status: SHIPPED | OUTPATIENT
Start: 2021-07-22 | End: 2021-11-22

## 2021-07-22 NOTE — PROGRESS NOTES
Assessment/Plan:    cut the b12 in 1/2  Change vit d to every 2 weeks   Iron was good   Stressed from issues with son - needs pharm intervention   Crying here  Cont with PM   Rheum was no help       1  Anxiety  -     escitalopram (LEXAPRO) 10 mg tablet; Take 1 tablet (10 mg total) by mouth daily    2  Encounter for screening mammogram for breast cancer  -     Mammo screening bilateral w 3d & cad; Future; Expected date: 07/22/2021    3  Vitamin D deficiency  -     ergocalciferol (VITAMIN D2) 50,000 units; Take 1 capsule (50,000 Units total) by mouth every 14 (fourteen) days    4  Chronic left-sided low back pain with left-sided sciatica    5  Postsurgical malabsorption    6  Fibromyalgia         Subjective:      Patient ID: Joe Methodist is a 64 y o  female  HPI   Still with left sided chronic pan     Tried MMJ   Lavelle didnt help   Getting MRI and hopefully AVIVA     -s/p Shawn-En-Y Gastric Bypass with Dr Katherine Stewart  Approximately 2010  Initial 5 now 80  Stress with son     The following portions of the patient's history were reviewed and updated as appropriate: allergies, current medications, past family history, past medical history, past social history, past surgical history and problem list     Review of Systems   Constitutional: Negative for fever and unexpected weight change  HENT: Negative for nosebleeds and trouble swallowing  Eyes: Negative for visual disturbance  Respiratory: Negative for chest tightness and shortness of breath  Cardiovascular: Negative for chest pain, palpitations and leg swelling  Gastrointestinal: Negative for abdominal pain, constipation, diarrhea and nausea  Endocrine: Negative for cold intolerance  Genitourinary: Negative for dysuria and urgency  Musculoskeletal: Positive for arthralgias, back pain and gait problem  Negative for joint swelling and myalgias  Skin: Negative for rash  Neurological: Negative for tremors, seizures and syncope     Hematological: Does not bruise/bleed easily  Psychiatric/Behavioral: Positive for behavioral problems, dysphoric mood and sleep disturbance  Negative for hallucinations and suicidal ideas  The patient is nervous/anxious  Objective:      /80   Pulse 85   Resp 18   Ht 5' 1" (1 549 m)   Wt 85 3 kg (188 lb)   SpO2 98%   BMI 35 52 kg/m²     Appointment on 07/21/2021   Component Date Value    Folate 07/21/2021 13 0     PTH 07/21/2021 71 1           Physical Exam  Vitals and nursing note reviewed  Constitutional:       Appearance: She is well-developed  HENT:      Head: Normocephalic and atraumatic  Cardiovascular:      Rate and Rhythm: Normal rate and regular rhythm  Heart sounds: Normal heart sounds  No murmur heard  Pulmonary:      Effort: Pulmonary effort is normal       Breath sounds: Normal breath sounds  No wheezing or rales  Abdominal:      General: Bowel sounds are normal  There is no distension  Palpations: Abdomen is soft  Tenderness: There is no abdominal tenderness  Musculoskeletal:         General: Tenderness present  Normal range of motion  Cervical back: Normal range of motion and neck supple  Lymphadenopathy:      Cervical: No cervical adenopathy  Skin:     General: Skin is warm and dry  Capillary Refill: Capillary refill takes less than 2 seconds  Findings: No rash  Neurological:      Mental Status: She is alert and oriented to person, place, and time  Cranial Nerves: No cranial nerve deficit  Sensory: No sensory deficit  Motor: No abnormal muscle tone  Gait: Gait abnormal    Psychiatric:         Mood and Affect: Mood is depressed  Affect is tearful  Behavior: Behavior normal          Thought Content:  Thought content normal          Judgment: Judgment normal              Oscar Marrero MD  [place] [address]

## 2021-07-23 LAB — ZINC SERPL-MCNC: 82 UG/DL (ref 44–115)

## 2021-07-25 LAB — VIT A SERPL-MCNC: 28.8 UG/DL (ref 20.1–62)

## 2021-07-26 LAB — VIT B1 BLD-SCNC: 132.8 NMOL/L (ref 66.5–200)

## 2021-07-26 NOTE — PROGRESS NOTES
Bariatric  Nutrition Assessment Note    Transfer care  s/p Shawn-En-Y Gastric Bypass with Dr Nilda Salguero ( ) in [address]  64 y o   female  There were no vitals taken for this visit  Height: 5'1"    Current Weight:185 2  BMI: 35  Weight on Day of Weight Loss Surgery: 222#  BMI: 41 9  Weight in (lb) to have BMI = 25: 132 5  DEBORAH:157#  1 5yrs post op  BMI: 24  Regain: 25-30#      Review of History and Medications   Taking 50,000 IU vitamin D3 weekly and 1000 mcg vitamin B12 with an additional weekly B12  No other supplements     She had a few vitamin levels recently checked/reviewed today (D, and ferritin improved)  Was advised to start multivitamin with iron and stop additional  B-12 - gave gave of Bariatric Pal Capsules and chewable calcium citrate to start (1500 mg) - report dx of osteopenia    Iron deficiency  Followed by hematology-last IV iron was in 2021 per pt     Past Medical History:   Diagnosis Date    Anxiety     Fibromyalgia     Medical history unknown     Osteopenia      Past Surgical History:   Procedure Laterality Date    CARPAL TUNNEL RELEASE Bilateral     21 y o  with left ulnar nerve release   CARPAL TUNNEL RELEASE Bilateral      SECTION      ELBOW SURGERY      Tennis elbow    GASTRIC BYPASS      REDUCTION MAMMAPLASTY Bilateral     WISDOM TOOTH EXTRACTION       Social History     Socioeconomic History    Marital status:       Spouse name: Not on file    Number of children: Not on file    Years of education: Not on file    Highest education level: Not on file   Occupational History    Not on file   Tobacco Use    Smoking status: Never Smoker    Smokeless tobacco: Never Used   Vaping Use    Vaping Use: Never used   Substance and Sexual Activity    Alcohol use: No    Drug use: Never    Sexual activity: Not on file   Other Topics Concern    Not on file   Social History Narrative    Not on file     Social Determinants of Health     Financial Resource Strain:     Difficulty of Paying Living Expenses:    Food Insecurity:     Worried About Running Out of Food in the Last Year:     [address] in the Last Year:    Transportation Needs:     Lack of Transportation (Medical):      Lack of Transportation (Non-Medical):    Physical Activity:     Days of Exercise per Week:     Minutes of Exercise per Session:    Stress:     Feeling of Stress :    Social Connections:     Frequency of Communication with Friends and Family:     Frequency of Social Gatherings with Friends and Family:     Attends [employer] Services:     Active Member of Clubs or Organizations:     Attends Club or Organization Meetings:     Marital Status:    Intimate Partner Violence:     Fear of Current or Ex-Partner:     Emotionally Abused:     Physically Abused:     Sexually Abused:        Current Outpatient Medications:     ascorbic acid (VITAMIN C) 500 MG tablet, Take 500 mg by mouth daily, Disp: , Rfl:     Cholecalciferol 25 MCG (1000 UT) tablet, Take 1,000 Units by mouth daily, Disp: , Rfl:     doxepin (SINEquan) 75 MG capsule, TAKE 1 CAP AT BEDTIME FOR 1 WEEK THEN INCREASE TO 2 CAPSULES AFTER 1 WEEK, Disp: 180 capsule, Rfl: 1    ergocalciferol (VITAMIN D2) 50,000 units, Take 1 capsule (50,000 Units total) by mouth every 14 (fourteen) days, Disp: 4 capsule, Rfl: 2    escitalopram (LEXAPRO) 10 mg tablet, Take 1 tablet (10 mg total) by mouth daily, Disp: 30 tablet, Rfl: 1    gabapentin (NEURONTIN) 100 mg capsule, Take 1 capsule (100 mg total) by mouth daily with dinner Increase to 200 mg nightly and 100 mg in morning after 1 week if tolerating , Disp: 90 capsule, Rfl: 0    vitamin B-12 (VITAMIN B-12) 500 mcg tablet, TAKE 2 TABLETS (1,000 MCG TOTAL) BY MOUTH DAILY, Disp: 60 tablet, Rfl: 2    Food Intake and Lifestyle Assessment     Food Intake Assessment completed via usual diet recall  Breakfast: Tea and Toast  Snack: on fruit  Lunch: Butter bread and cheese, fruit - figs  Dinner: not often  Snack: rare candy  Beverage intake: Flavored water and tea - no alcohol  Diet texture/stage: regular  Protein supplement: None  Estimated protein intake per day: inadequate - main source is cheese  Estimated fluid intake per day: 8-10 bottles  Meals eaten away from home: slice pizza - not often  Typical meal pattern:Does not eat meals  Eating Behaviors: Appropriate diet advancement and Frequent snacking/ grazing  Tolerating a regular diet-yes  Eating at least 60 grams of protein per day-no  Following 30/60 minute rule with liquids-no  Volume ~ 1 cup  Drinking at least 64 ounces of fluid per day-yes  Drinking carbonated beverages- stopped  No food logging  Sufficient exercise-limited with left hip pain and fibromyalgia per patient  Food allergies or intolerances: No allergies; lactose intolerant  Cultural or Scientology considerations: None    Physical Assessment  Nutrition Related Findings  Constipation    Physical Activity does keep moving - keeps busy  Types of exercise: None  Current physical limitations: Hip/back pain    Psychosocial Assessment   Support systems: none (issues with son recently-  Has new grandson but not allowed to see - very upset as her son was her only support -  and other immediate family memebers has passed)  Socioeconomic factors: No assessed    Nutrition Diagnosis  Diagnosis: Overweight / Obesity (NC-3 3) - improved with RNY BMI was 41 9 to 24 + regain now 35  Related to: Limited adherence to nutrition-related recommendations, Physical inactivity and Altered GI function(RNY)  As Evidenced by: Unintentional weight gain     Interventions and Teaching   Patient educated on post-op nutrition guidelines  Patient educated and handouts provided    Capacity of post-surgery stomach  Adequate hydration  Sugar and fat restriction to decrease "dumping syndrome"  Exercise  Protein supplements  Meal planning and preparation  Appropriate carbohydrate, protein, and fat intake, and food/fluid choices to maximize safe weight loss, nutrient intake, and tolerance   Dietary and lifestyle changes  Possible problems with poor eating habits  Potential for food intolerance after surgery, and ways to deal with them including: lactose intolerance, nausea, reflux, vomiting, diarrhea, food intolerance, appetite changes, gas  Vitamin / Mineral supplementation of Multivitamin with minerals, Calcium, Vitamin B12, Iron and Vitamin D    Education provided to: patient    Barriers to learning: No barriers identified    Readiness to change: wants to get back on track    Comprehension: verbalizes understanding     Expected Compliance: good    Goals  Food journal, Eat 3 meals per day and Eliminate mindless snacking   Pt to try Bariatric Pal MVI X 30 day sample provided -  Continue weekly Vitamin D   Pt to start calcium citrate 500 mg X 3 daily - samples of chews provided  Pt to trial protein drinks - use as meal replacement to help establish 3 meal /day pattern  Increase protein - include at each meal - add more variety ( less cheese) - Protein Snack Handout provided to give pt ideas   Maintain adequate hydration  Diet to aid in constipation - also advised on OTC remedies to keep regular  F/U next month w/RD - discuss more on food logging and refer to SW to help with coping skills  F/U annually with surgical PA-C for bloodwork and assessment         Time Spent:   30 Minutes

## 2021-07-28 ENCOUNTER — OFFICE VISIT (OUTPATIENT)
Dept: BARIATRICS | Facility: CLINIC | Age: 56
End: 2021-07-28

## 2021-07-28 VITALS — HEIGHT: 61 IN | WEIGHT: 185.2 LBS | BODY MASS INDEX: 34.97 KG/M2

## 2021-07-28 DIAGNOSIS — K91.2 POSTSURGICAL MALABSORPTION: Primary | ICD-10-CM

## 2021-07-28 PROCEDURE — RECHECK

## 2021-07-28 PROCEDURE — 3008F BODY MASS INDEX DOCD: CPT | Performed by: FAMILY MEDICINE

## 2021-08-08 ENCOUNTER — HOSPITAL ENCOUNTER (OUTPATIENT)
Dept: MRI IMAGING | Facility: HOSPITAL | Age: 56
Discharge: HOME | End: 2021-08-08
Attending: PHYSICAL MEDICINE & REHABILITATION
Payer: COMMERCIAL

## 2021-08-08 DIAGNOSIS — M79.7 FIBROMYALGIA: ICD-10-CM

## 2021-08-08 DIAGNOSIS — G89.29 CHRONIC LEFT-SIDED LOW BACK PAIN WITH LEFT-SIDED SCIATICA: ICD-10-CM

## 2021-08-08 DIAGNOSIS — M25.552 PAIN IN LEFT HIP: ICD-10-CM

## 2021-08-08 DIAGNOSIS — M54.42 CHRONIC LEFT-SIDED LOW BACK PAIN WITH LEFT-SIDED SCIATICA: ICD-10-CM

## 2021-08-08 PROCEDURE — G1004 CDSM NDSC: HCPCS

## 2021-08-08 PROCEDURE — 72148 MRI LUMBAR SPINE W/O DYE: CPT

## 2021-08-17 ENCOUNTER — OFFICE VISIT (OUTPATIENT)
Dept: URGENT CARE | Facility: CLINIC | Age: 56
End: 2021-08-17
Payer: COMMERCIAL

## 2021-08-17 VITALS
HEIGHT: 61 IN | DIASTOLIC BLOOD PRESSURE: 71 MMHG | WEIGHT: 185 LBS | HEART RATE: 89 BPM | BODY MASS INDEX: 34.93 KG/M2 | RESPIRATION RATE: 16 BRPM | SYSTOLIC BLOOD PRESSURE: 135 MMHG

## 2021-08-17 DIAGNOSIS — M54.42 ACUTE BILATERAL LOW BACK PAIN WITH BILATERAL SCIATICA: ICD-10-CM

## 2021-08-17 DIAGNOSIS — M54.41 ACUTE BILATERAL LOW BACK PAIN WITH BILATERAL SCIATICA: ICD-10-CM

## 2021-08-17 DIAGNOSIS — M54.16 LUMBAR RADICULOPATHY: Primary | ICD-10-CM

## 2021-08-17 PROCEDURE — 99213 OFFICE O/P EST LOW 20 MIN: CPT | Performed by: PHYSICIAN ASSISTANT

## 2021-08-17 RX ORDER — LIDOCAINE 50 MG/G
1 PATCH TOPICAL DAILY
Qty: 15 PATCH | Refills: 0 | Status: SHIPPED | OUTPATIENT
Start: 2021-08-17 | End: 2021-10-26

## 2021-08-17 RX ORDER — METHOCARBAMOL 500 MG/1
500 TABLET, FILM COATED ORAL 3 TIMES DAILY
Qty: 21 TABLET | Refills: 0 | Status: SHIPPED | OUTPATIENT
Start: 2021-08-17 | End: 2021-09-17

## 2021-08-17 RX ORDER — PREDNISONE 10 MG/1
50 TABLET ORAL DAILY
Qty: 25 TABLET | Refills: 0 | Status: SHIPPED | OUTPATIENT
Start: 2021-08-17 | End: 2021-08-22

## 2021-08-17 NOTE — PATIENT INSTRUCTIONS
In using prednisone as prescribed  Take this medication the morning with food  If take antacid such as Prilosec or omeprazole   the in using muscle relaxers prescribed  Do not drive or operate machinery on this medication because it can cause drowsiness   use topical Lidoderm patches to the affected area   follow up her primary care physician in 3-5 days for continue symptoms   procedure the ER with any worsening of symptoms, loss of bowel or bladder function, numbness or tingling in between the legs, fever or chills    Acute Low Back Pain, Ambulatory Care   GENERAL INFORMATION:   Acute low back pain  is discomfort in your lower back area that lasts for less than 12 weeks  The word acute is used to describe pain that starts suddenly, worsens quickly, and lasts for a short time  Common symptoms include the following:   · Back stiffness or spasms    · Pain down the back or side of one leg    · Holding yourself in an unusual position or posture to decrease your back pain    · Not being able to find a sitting position that is comfortable    · Slow increase in your pain for 24 to 48 hours after you stress your back    · Tenderness on your lower back or severe pain when you move your back  Seek immediate care for the following symptoms:   · Severe pain    · Sudden stiffness and heaviness in both buttocks down to both legs    · Numbness or weakness in one leg, or pain in both legs    · Numbness in your genital area or across your lower back    · Unable to control your urine or bowel movements  Treatment for acute low back pain  may include any of the following:  · Medicines:      ¨ NSAIDs  help decrease swelling and pain or fever  This medicine is available with or without a doctor's order  NSAIDs can cause stomach bleeding or kidney problems in certain people  If you take blood thinner medicine, always ask your healthcare provider if NSAIDs are safe for you   Always read the medicine label and follow directions  ¨ Muscle relaxers  help decrease muscle spasms pain  ¨ Prescription pain medicine  may be given  Ask how to take this medicine safely  · Surgery  may be needed if your pain is severe and other treatments do not work  Surgery may be needed for conditions of the lumbar spine, such as herniated disc or spinal stenosis  Manage your symptoms:   · Sleep on a firm mattress  If you do not have a firm mattress, have someone move your mattress to the floor for a few days  A piece of plywood under your mattress can also help make it firmer  · Apply ice  on your lower back for 15 to 20 minutes every hour or as directed  Use an ice pack, or put crushed ice in a plastic bag  Cover it with a towel  Ice helps prevent tissue damage and decreases swelling and pain  You can alternate ice and heat  · Apply heat  on your lower back for 20 to 30 minutes every 2 hours for as many days as directed  Heat helps decrease pain and muscle spasms  · Go to physical therapy  A physical therapist teaches you exercises to help improve movement and strength, and to decrease pain  Prevent acute low back pain:   · Use proper body mechanics  ¨ Bend at the hips and knees when you  objects  Do not bend from the waist  Use your leg muscles as you lift the load  Do not use your back  Keep the object close to your chest as you lift it  Try not to twist or lift anything above your waist     ¨ Change your position often when you stand for long periods of time  Rest one foot on a small box or footrest, and then switch to the other foot often  ¨ Try not to sit for long periods of time  When you do, sit in a straight-backed chair with your feet flat on the floor  Never reach, pull, or push while you are sitting  · Exercise regularly  Warm up before you exercise  Do exercises that strengthen your back muscles  Ask about the best exercise plan for you  · Maintain a healthy weight    Ask your healthcare provider how much you should weigh  Ask him to help you create a weight loss plan if you are overweight  Follow up with your healthcare provider as directed:  Return for a follow-up visit if you still have pain after 1 to 3 weeks of treatment  You may need to visit an orthopedist if your back pain lasts more than 6 to 12 weeks  Write down your questions so you remember to ask them during your visits  CARE AGREEMENT:   You have the right to help plan your care  Learn about your health condition and how it may be treated  Discuss treatment options with your caregivers to decide what care you want to receive  You always have the right to refuse treatment  The above information is an  only  It is not intended as medical advice for individual conditions or treatments  Talk to your doctor, nurse or pharmacist before following any medical regimen to see if it is safe and effective for you  © 2014 [address] is for End User's use only and may not be sold, redistributed or otherwise used for commercial purposes  All illustrations and images included in CareNotes® are the copyrighted property of [employer]  or Barry Hinojosa

## 2021-08-17 NOTE — PROGRESS NOTES
[address]        NAME: Julius Howell is a 64 y o  female  : 1965    MRN: [id number]  DATE: 2021  TIME: 8:47 PM    Assessment and Plan   Lumbar radiculopathy [M54 16]  1  Lumbar radiculopathy  methocarbamol (ROBAXIN) 500 mg tablet    predniSONE 10 mg tablet    lidocaine (LIDODERM) 5 %   2  Acute bilateral low back pain with bilateral sciatica  methocarbamol (ROBAXIN) 500 mg tablet    predniSONE 10 mg tablet    lidocaine (LIDODERM) 5 %         Patient Instructions      begin using prednisone as prescribed  Take this medication the morning with food  If take antacid such as Prilosec or omeprazole   the in using muscle relaxers prescribed  Do not drive or operate machinery on this medication because it can cause drowsiness   use topical Lidoderm patches to the affected area   follow up her primary care physician in 3-5 days for continue symptoms   procedure the ER with any worsening of symptoms, loss of bowel or bladder function, numbness or tingling in between the legs, fever or chills  Follow up with PCP in 3-5 days  Proceed to  ER if symptoms worsen  Chief Complaint     Chief Complaint   Patient presents with    Hip Pain     bilateral hip pain  Was seen for L hip, had MRI, now R hip is worse recently  Called ortho but cannot get in until September         History of Present Illness       64year old female  past medical history of fibromyalgia, chronic back pain presents to the office for c/o of  Lower back pain with radiation to bilateral hips with right being more significant than left today  Patient has been seeing orthopedics as well as physical therapy in the past to help with her pain  She was given an injection to the left hip which helped for a few days  In the past left hip and back were worse than right however over last few days right lower back and hip have been more significant    Patient locates majority of her pain over the right lumbar area with radiation down the buttocks to anterior thigh in L4-L5 distribution  Patient denies any new rashes to the area  Patient had recent MRI in regards to her lower back pain however does not have follow-up with orthopedist until September  Patient denies any loss of bowel or bladder function, fevers or chills, numbness or tingling between the legs or down the legs  Back Pain  This is a new problem  The current episode started in the past 7 days  The problem occurs daily  The problem has been gradually worsening since onset  The pain is present in the lumbar spine  Quality: stabbing pain  The pain radiates to the right thigh and left thigh (madiha hips R>L)  The pain is at a severity of 10/10  The symptoms are aggravated by bending and twisting  Pertinent negatives include no bladder incontinence, bowel incontinence, dysuria, fever, numbness, perianal numbness or tingling  Risk factors include history of osteoporosis, history of steroid use and obesity  She has tried NSAIDs (Gabapentin) for the symptoms  The treatment provided no relief  Review of Systems   Review of Systems   Constitutional: Negative for chills and fever  Respiratory: Negative  Cardiovascular: Negative  Gastrointestinal: Negative for bowel incontinence  Genitourinary: Negative for bladder incontinence and dysuria  Musculoskeletal: Positive for back pain, gait problem (secondary to pain  ) and myalgias  Skin: Negative for color change and wound  Neurological: Negative for tingling and numbness           Current Medications       Current Outpatient Medications:     ascorbic acid (VITAMIN C) 500 MG tablet, Take 500 mg by mouth daily, Disp: , Rfl:     Cholecalciferol 25 MCG (1000 UT) tablet, Take 1,000 Units by mouth daily, Disp: , Rfl:     doxepin (SINEquan) 75 MG capsule, TAKE 1 CAP AT BEDTIME FOR 1 WEEK THEN INCREASE TO 2 CAPSULES AFTER 1 WEEK, Disp: 180 capsule, Rfl: 1    ergocalciferol (VITAMIN D2) 50,000 units, Take 1 capsule (50,000 Units total) by mouth every 14 (fourteen) days, Disp: 4 capsule, Rfl: 2    escitalopram (LEXAPRO) 10 mg tablet, TAKE 1 TABLET BY MOUTH EVERY DAY, Disp: 90 tablet, Rfl: 0    gabapentin (NEURONTIN) 100 mg capsule, Take 1 capsule (100 mg total) by mouth daily with dinner Increase to 200 mg nightly and 100 mg in morning after 1 week if tolerating , Disp: 90 capsule, Rfl: 0    vitamin B-12 (VITAMIN B-12) 500 mcg tablet, TAKE 2 TABLETS (1,000 MCG TOTAL) BY MOUTH DAILY, Disp: 60 tablet, Rfl: 2    lidocaine (LIDODERM) 5 %, Apply 1 patch topically daily Remove & Discard patch within 12 hours or as directed by MD, Disp: 15 patch, Rfl: 0    methocarbamol (ROBAXIN) 500 mg tablet, Take 1 tablet (500 mg total) by mouth 3 (three) times a day, Disp: 21 tablet, Rfl: 0    predniSONE 10 mg tablet, Take 5 tablets (50 mg total) by mouth daily for 5 days, Disp: 25 tablet, Rfl: 0    Current Allergies     Allergies as of 2021    (No Known Allergies)            The following portions of the patient's history were reviewed and updated as appropriate: allergies, current medications, past family history, past medical history, past social history, past surgical history and problem list      Past Medical History:   Diagnosis Date    Anxiety     Fibromyalgia     Medical history unknown     Osteopenia        Past Surgical History:   Procedure Laterality Date    CARPAL TUNNEL RELEASE Bilateral     21 y o  with left ulnar nerve release        CARPAL TUNNEL RELEASE Bilateral      SECTION      ELBOW SURGERY      Tennis elbow    GASTRIC BYPASS      REDUCTION MAMMAPLASTY Bilateral     WISDOM TOOTH EXTRACTION         Family History   Problem Relation Age of Onset    Ovarian cancer Mother     Heart disease Mother     Colon cancer Father     No Known Problems Maternal Grandmother     No Known Problems Maternal Grandfather     Diabetes Paternal Grandmother     No Known Problems Paternal Grandfather     No Known Problems Son     Leukemia Maternal Uncle     Diabetes Paternal Aunt     Diabetes Paternal Uncle          Medications have been verified  Objective   /71 (Patient Position: Sitting)   Pulse 89   Resp 16   Ht 5' 1" (1 549 m)   Wt 83 9 kg (185 lb)   BMI 34 96 kg/m²   No LMP recorded  Patient is postmenopausal        Physical Exam     Physical Exam  Vitals and nursing note reviewed  Constitutional:       General: She is not in acute distress  Appearance: She is well-developed  Comments:   Patient appears uncomfortable sitting on chair to the side with right leg straight   HENT:      Head: Normocephalic and atraumatic  Right Ear: Hearing and external ear normal       Left Ear: Hearing and external ear normal       Nose: Nose normal    Eyes:      Conjunctiva/sclera: Conjunctivae normal       Pupils: Pupils are equal, round, and reactive to light  Cardiovascular:      Rate and Rhythm: Normal rate and regular rhythm  Pulses: Normal pulses  Heart sounds: Normal heart sounds  No murmur heard  No friction rub  No gallop  Pulmonary:      Effort: Pulmonary effort is normal  No respiratory distress  Breath sounds: Normal breath sounds  No wheezing or rales  Musculoskeletal:         General: No deformity  Cervical back: Normal range of motion  Lumbar back: Spasms, tenderness and bony tenderness (of SI joint and sciatic notch) present  No swelling, signs of trauma or lacerations  Decreased range of motion  Comments:  Sensation intact to crude touch in bilateral lower extremities  Significantly decreased range of motion with flexion, extension, lateral bending and twisting  Gait antalgic secondary to pain   Skin:     General: Skin is warm and dry  Capillary Refill: Capillary refill takes less than 2 seconds  Findings: No ecchymosis, erythema or laceration  Neurological:      Mental Status: She is alert  Sensory: No sensory deficit  Motor: No abnormal muscle tone  Deep Tendon Reflexes: Reflexes normal    Psychiatric:         Behavior: Behavior is cooperative

## 2021-08-20 ENCOUNTER — TELEPHONE (OUTPATIENT)
Dept: HEMATOLOGY ONCOLOGY | Facility: CLINIC | Age: 56
End: 2021-08-20

## 2021-08-23 ENCOUNTER — OFFICE VISIT (OUTPATIENT)
Dept: HEMATOLOGY ONCOLOGY | Facility: CLINIC | Age: 56
End: 2021-08-23
Payer: COMMERCIAL

## 2021-08-23 ENCOUNTER — APPOINTMENT (OUTPATIENT)
Dept: LAB | Facility: HOSPITAL | Age: 56
End: 2021-08-23
Payer: COMMERCIAL

## 2021-08-23 VITALS
OXYGEN SATURATION: 97 % | SYSTOLIC BLOOD PRESSURE: 128 MMHG | DIASTOLIC BLOOD PRESSURE: 74 MMHG | RESPIRATION RATE: 17 BRPM | TEMPERATURE: 98.2 F | HEART RATE: 85 BPM

## 2021-08-23 DIAGNOSIS — Z98.84 H/O GASTRIC BYPASS: ICD-10-CM

## 2021-08-23 DIAGNOSIS — K91.2 POSTSURGICAL MALABSORPTION: Primary | ICD-10-CM

## 2021-08-23 DIAGNOSIS — E53.8 B12 DEFICIENCY: ICD-10-CM

## 2021-08-23 DIAGNOSIS — E61.1 IRON DEFICIENCY: ICD-10-CM

## 2021-08-23 LAB
ALBUMIN SERPL BCP-MCNC: 3.7 G/DL (ref 3.4–4.8)
ALP SERPL-CCNC: 56.3 U/L (ref 35–140)
ALT SERPL W P-5'-P-CCNC: 18 U/L (ref 5–54)
ANION GAP SERPL CALCULATED.3IONS-SCNC: 5 MMOL/L (ref 4–13)
AST SERPL W P-5'-P-CCNC: 15 U/L (ref 15–41)
BASOPHILS # BLD AUTO: 0.02 THOUSANDS/ΜL (ref 0–0.1)
BASOPHILS NFR BLD AUTO: 0 % (ref 0–1)
BILIRUB SERPL-MCNC: 0.31 MG/DL (ref 0.3–1.2)
BUN SERPL-MCNC: 14 MG/DL (ref 6–20)
CALCIUM SERPL-MCNC: 8.6 MG/DL (ref 8.4–10.2)
CHLORIDE SERPL-SCNC: 106 MMOL/L (ref 96–108)
CO2 SERPL-SCNC: 30 MMOL/L (ref 22–33)
CREAT SERPL-MCNC: 0.7 MG/DL (ref 0.4–1.1)
EOSINOPHIL # BLD AUTO: 0.16 THOUSAND/ΜL (ref 0–0.61)
EOSINOPHIL NFR BLD AUTO: 2 % (ref 0–6)
ERYTHROCYTE [DISTWIDTH] IN BLOOD BY AUTOMATED COUNT: 11.3 % (ref 11.6–15.1)
FERRITIN SERPL-MCNC: 170 NG/ML (ref 8–388)
GFR SERPL CREATININE-BSD FRML MDRD: 97 ML/MIN/1.73SQ M
GLUCOSE P FAST SERPL-MCNC: 72 MG/DL (ref 70–105)
HCT VFR BLD AUTO: 40.2 % (ref 34.8–46.1)
HGB BLD-MCNC: 13.5 G/DL (ref 11.5–15.4)
IMM GRANULOCYTES # BLD AUTO: 0 THOUSAND/UL (ref 0–0.2)
IMM GRANULOCYTES NFR BLD AUTO: 0 % (ref 0–2)
IRON SATN MFR SERPL: 25 %
IRON SERPL-MCNC: 74 UG/DL (ref 50–170)
LYMPHOCYTES # BLD AUTO: 1.4 THOUSANDS/ΜL (ref 0.6–4.47)
LYMPHOCYTES NFR BLD AUTO: 18 % (ref 14–44)
MCH RBC QN AUTO: 32.1 PG (ref 26.8–34.3)
MCHC RBC AUTO-ENTMCNC: 33.6 G/DL (ref 31.4–37.4)
MCV RBC AUTO: 96 FL (ref 82–98)
MONOCYTES # BLD AUTO: 0.71 THOUSAND/ΜL (ref 0.17–1.22)
MONOCYTES NFR BLD AUTO: 9 % (ref 4–12)
NEUTROPHILS # BLD AUTO: 5.69 THOUSANDS/ΜL (ref 1.85–7.62)
NEUTS SEG NFR BLD AUTO: 71 % (ref 43–75)
PLATELET # BLD AUTO: 236 THOUSANDS/UL (ref 149–390)
PMV BLD AUTO: 10.2 FL (ref 8.9–12.7)
POTASSIUM SERPL-SCNC: 4.1 MMOL/L (ref 3.5–5)
PROT SERPL-MCNC: 6.6 G/DL (ref 6.4–8.3)
RBC # BLD AUTO: 4.21 MILLION/UL (ref 3.81–5.12)
SODIUM SERPL-SCNC: 141 MMOL/L (ref 133–145)
TIBC SERPL-MCNC: 295 UG/DL (ref 250–450)
VIT B12 SERPL-MCNC: 579 PG/ML (ref 100–900)
WBC # BLD AUTO: 7.98 THOUSAND/UL (ref 4.31–10.16)

## 2021-08-23 PROCEDURE — 99213 OFFICE O/P EST LOW 20 MIN: CPT | Performed by: PHYSICIAN ASSISTANT

## 2021-08-23 PROCEDURE — 83550 IRON BINDING TEST: CPT

## 2021-08-23 PROCEDURE — 82728 ASSAY OF FERRITIN: CPT

## 2021-08-23 PROCEDURE — 80053 COMPREHEN METABOLIC PANEL: CPT

## 2021-08-23 PROCEDURE — 82607 VITAMIN B-12: CPT

## 2021-08-23 PROCEDURE — 83540 ASSAY OF IRON: CPT

## 2021-08-23 PROCEDURE — 36415 COLL VENOUS BLD VENIPUNCTURE: CPT

## 2021-08-23 PROCEDURE — 85025 COMPLETE CBC W/AUTO DIFF WBC: CPT

## 2021-08-26 ENCOUNTER — TELEPHONE (OUTPATIENT)
Dept: HEMATOLOGY ONCOLOGY | Facility: CLINIC | Age: 56
End: 2021-08-26

## 2021-08-26 NOTE — TELEPHONE ENCOUNTER
Spoke with patient and schedule her to see Erik Mulligan PA-C on 2/7/21 at 8:30 am in the [facility] office, also advised patient to have labs done prior to appt  Patient voiced understanding

## 2021-09-17 ENCOUNTER — OFFICE VISIT (OUTPATIENT)
Dept: OBGYN CLINIC | Facility: CLINIC | Age: 56
End: 2021-09-17
Payer: COMMERCIAL

## 2021-09-17 VITALS
SYSTOLIC BLOOD PRESSURE: 147 MMHG | WEIGHT: 185 LBS | BODY MASS INDEX: 34.93 KG/M2 | DIASTOLIC BLOOD PRESSURE: 87 MMHG | HEART RATE: 99 BPM | HEIGHT: 61 IN

## 2021-09-17 DIAGNOSIS — G57.01 PIRIFORMIS SYNDROME OF RIGHT SIDE: Primary | ICD-10-CM

## 2021-09-17 DIAGNOSIS — M54.42 CHRONIC LEFT-SIDED LOW BACK PAIN WITH LEFT-SIDED SCIATICA: ICD-10-CM

## 2021-09-17 DIAGNOSIS — G89.29 CHRONIC LEFT-SIDED LOW BACK PAIN WITH LEFT-SIDED SCIATICA: ICD-10-CM

## 2021-09-17 DIAGNOSIS — M25.551 PAIN IN RIGHT HIP: ICD-10-CM

## 2021-09-17 PROCEDURE — 99214 OFFICE O/P EST MOD 30 MIN: CPT | Performed by: PHYSICAL MEDICINE & REHABILITATION

## 2021-09-17 RX ORDER — BACLOFEN 10 MG/1
10 TABLET ORAL 2 TIMES DAILY PRN
Qty: 40 TABLET | Refills: 0 | Status: SHIPPED | OUTPATIENT
Start: 2021-09-17 | End: 2021-12-21 | Stop reason: SDUPTHER

## 2021-09-17 RX ORDER — METHYLPREDNISOLONE 4 MG/1
TABLET ORAL
Qty: 1 EACH | Refills: 0 | Status: SHIPPED | OUTPATIENT
Start: 2021-09-17 | End: 2021-10-26

## 2021-09-17 RX ORDER — GABAPENTIN 300 MG/1
300 CAPSULE ORAL 2 TIMES DAILY
Qty: 90 CAPSULE | Refills: 0 | Status: SHIPPED | OUTPATIENT
Start: 2021-09-17 | End: 2021-10-26

## 2021-09-17 NOTE — PROGRESS NOTES
1  Piriformis syndrome of right side  Ambulatory referral to Pain Management    methylPREDNISolone 4 MG tablet therapy pack    gabapentin (NEURONTIN) 300 mg capsule    baclofen 10 mg tablet   2  Chronic left-sided low back pain with left-sided sciatica  Ambulatory referral to Pain Management    methylPREDNISolone 4 MG tablet therapy pack    gabapentin (NEURONTIN) 300 mg capsule    baclofen 10 mg tablet   3  Pain in right hip  Ambulatory referral to Pain Management    methylPREDNISolone 4 MG tablet therapy pack    gabapentin (NEURONTIN) 300 mg capsule    baclofen 10 mg tablet    CANCELED: XR hip/pelv 2-3 vws right if performed     Orders Placed This Encounter   Procedures    Ambulatory referral to Pain Management        Impression:  Patient is here in follow up of chronic left-sided low-back pain that is multifactorial and is predominantly due to left SI joint dysfunction and fibromyalgia  She now has more pain along the right side consistent with piriformis syndrome/sciatica  She is neurologically intacct  Patient had an ultrasound-guided left hip joint injection which only helped for about a day or so  Collene Goldmann has gone through physical therapy, ultrasound-guided injections, anti-inflammatories, muscle relaxants and gabapentin but continues to have pain  I have prescribed her medrol burst, baclofen and gabapentin increase  We will also have her see pain management  Imaging Studies (I personally reviewed images in PACS and report):  Lumbar spine x-rays that are most recent to this encounter were reviewed   These images show   "There are 5 non rib bearing lumbar vertebral bodies       There is no evidence of acute fracture or destructive osseous lesion      Mild dextroconvex scoliosis       There are degenerative changes in the facet joints of the lower lumbar spine and in the disc spaces of the lower thoracic and upper lumbar spine      The pedicles appear intact   No pars defects   SI joints appear normal      There are nonspecific calcifications in the left flank area not clearly related to the left kidney      IMPRESSION:     No acute osseous abnormality        Degenerative changes as described      Scoliosis      Nonspecific left flank calcification of uncertain etiology  "     Left hip x-rays obtained on 4/27/2021   These images show preserved left hip joint space   No acute osseous abnormalities  Lumbar spine MRI review  No follow-ups on file  Patient is in agreement with the above plan  HPI:  Christina Mulligan is a 64 y o  female  who presents in follow up  Here for   Chief Complaint   Patient presents with    Lower Back - Pain, Follow-up    Right Hip - Pain    Left Hip - Pain, Follow-up       Date of injury:  Chronic  Trajectory of symptoms:  Patient's is now more along the right side in she is having difficulty with activities of daily living  Review of Systems   Constitutional: Positive for activity change  Negative for fever  HENT: Negative for sore throat and trouble swallowing  Eyes: Negative for visual disturbance  Respiratory: Negative for shortness of breath  Cardiovascular: Negative for chest pain  Gastrointestinal: Negative for abdominal pain  Denies bowel incontinence  Endocrine: Negative for polydipsia  Genitourinary: Negative for difficulty urinating  Denies urinary incontinence  Musculoskeletal: Positive for arthralgias and back pain  Negative for gait problem  Skin: Negative for rash  Allergic/Immunologic: Negative for immunocompromised state  Neurological: Negative for weakness and numbness  Denies saddle anesthesia  Hematological: Does not bruise/bleed easily  Psychiatric/Behavioral: Negative for confusion         Following history reviewed and updated:  Past Medical History:   Diagnosis Date    Anxiety     Fibromyalgia     Medical history unknown     Osteopenia      Past Surgical History:   Procedure Laterality Date  CARPAL TUNNEL RELEASE Bilateral     21 y o  with left ulnar nerve release   CARPAL TUNNEL RELEASE Bilateral      SECTION      ELBOW SURGERY      Tennis elbow    GASTRIC BYPASS      REDUCTION MAMMAPLASTY Bilateral     WISDOM TOOTH EXTRACTION       Social History   Social History     Substance and Sexual Activity   Alcohol Use No     Social History     Substance and Sexual Activity   Drug Use Never     Social History     Tobacco Use   Smoking Status Never Smoker   Smokeless Tobacco Never Used     Family History   Problem Relation Age of Onset    Ovarian cancer Mother     Heart disease Mother     Colon cancer Father     No Known Problems Maternal Grandmother     No Known Problems Maternal Grandfather     Diabetes Paternal Grandmother     No Known Problems Paternal Grandfather     No Known Problems Son     Leukemia Maternal Uncle     Diabetes Paternal Aunt     Diabetes Paternal Uncle      No Known Allergies     Constitutional:  /87   Pulse 99   Ht 5' 1" (1 549 m)   Wt 83 9 kg (185 lb)   BMI 34 96 kg/m²    General: NAD  Eyes: Clear sclerae  ENT: No inflammation, lesion, or mass of lips  No tracheal deviation  Musculoskeletal: As mentioned below  Integumentary: No visible rashes or skin lesions  Pulmonary/Chest: Effort normal  No respiratory distress  Neuro: CN's grossly intact, LU  Psych: Normal affect and judgement  Vascular: WWP  Back Exam     Tenderness   The patient is experiencing tenderness in the sacroiliac (Right piriformis musculature)  Range of Motion   Extension: abnormal   Flexion: normal     Muscle Strength   The patient has normal back strength  Tests   Straight leg raise right: positive    Other   Sensation: normal  Gait: Right antalgia                Procedures

## 2021-10-25 ENCOUNTER — TELEPHONE (OUTPATIENT)
Dept: FAMILY MEDICINE CLINIC | Facility: CLINIC | Age: 56
End: 2021-10-25

## 2021-10-26 ENCOUNTER — CONSULT (OUTPATIENT)
Dept: PAIN MEDICINE | Facility: CLINIC | Age: 56
End: 2021-10-26
Payer: COMMERCIAL

## 2021-10-26 VITALS
HEART RATE: 87 BPM | SYSTOLIC BLOOD PRESSURE: 148 MMHG | DIASTOLIC BLOOD PRESSURE: 76 MMHG | WEIGHT: 191 LBS | BODY MASS INDEX: 36.09 KG/M2

## 2021-10-26 DIAGNOSIS — G57.01 PIRIFORMIS SYNDROME OF RIGHT SIDE: ICD-10-CM

## 2021-10-26 DIAGNOSIS — M25.551 BILATERAL HIP PAIN: ICD-10-CM

## 2021-10-26 DIAGNOSIS — M70.61 GREATER TROCHANTERIC BURSITIS OF BOTH HIPS: ICD-10-CM

## 2021-10-26 DIAGNOSIS — M47.816 LUMBAR FACET ARTHROPATHY: ICD-10-CM

## 2021-10-26 DIAGNOSIS — G57.02 PIRIFORMIS SYNDROME OF LEFT SIDE: ICD-10-CM

## 2021-10-26 DIAGNOSIS — M46.1 SACROILIITIS (HCC): ICD-10-CM

## 2021-10-26 DIAGNOSIS — G89.4 CHRONIC PAIN SYNDROME: Primary | ICD-10-CM

## 2021-10-26 DIAGNOSIS — M25.552 BILATERAL HIP PAIN: ICD-10-CM

## 2021-10-26 DIAGNOSIS — M70.62 GREATER TROCHANTERIC BURSITIS OF BOTH HIPS: ICD-10-CM

## 2021-10-26 PROCEDURE — 99244 OFF/OP CNSLTJ NEW/EST MOD 40: CPT | Performed by: PHYSICAL MEDICINE & REHABILITATION

## 2021-10-26 RX ORDER — GABAPENTIN 600 MG/1
600 TABLET ORAL 3 TIMES DAILY
Qty: 90 TABLET | Refills: 2 | Status: SHIPPED | OUTPATIENT
Start: 2021-10-26 | End: 2022-03-02 | Stop reason: SDUPTHER

## 2021-10-29 ENCOUNTER — TELEPHONE (OUTPATIENT)
Dept: PAIN MEDICINE | Facility: CLINIC | Age: 56
End: 2021-10-29

## 2021-11-07 DIAGNOSIS — F41.9 ANXIETY: ICD-10-CM

## 2021-11-08 RX ORDER — ESCITALOPRAM OXALATE 10 MG/1
TABLET ORAL
Qty: 90 TABLET | Refills: 0 | Status: SHIPPED | OUTPATIENT
Start: 2021-11-08 | End: 2021-11-22

## 2021-11-15 ENCOUNTER — HOSPITAL ENCOUNTER (OUTPATIENT)
Dept: RADIOLOGY | Facility: CLINIC | Age: 56
Discharge: HOME | End: 2021-11-15
Attending: PHYSICAL MEDICINE & REHABILITATION | Admitting: PHYSICAL MEDICINE & REHABILITATION
Payer: COMMERCIAL

## 2021-11-15 VITALS
HEART RATE: 74 BPM | OXYGEN SATURATION: 98 % | DIASTOLIC BLOOD PRESSURE: 83 MMHG | SYSTOLIC BLOOD PRESSURE: 156 MMHG | RESPIRATION RATE: 20 BRPM | TEMPERATURE: 97.2 F

## 2021-11-15 DIAGNOSIS — G57.01 PIRIFORMIS SYNDROME OF RIGHT SIDE: ICD-10-CM

## 2021-11-15 DIAGNOSIS — M70.61 GREATER TROCHANTERIC BURSITIS OF BOTH HIPS: ICD-10-CM

## 2021-11-15 DIAGNOSIS — G89.4 CHRONIC PAIN SYNDROME: ICD-10-CM

## 2021-11-15 DIAGNOSIS — M25.552 BILATERAL HIP PAIN: ICD-10-CM

## 2021-11-15 DIAGNOSIS — M47.816 LUMBAR FACET ARTHROPATHY: ICD-10-CM

## 2021-11-15 DIAGNOSIS — M25.551 BILATERAL HIP PAIN: ICD-10-CM

## 2021-11-15 DIAGNOSIS — M70.62 GREATER TROCHANTERIC BURSITIS OF BOTH HIPS: ICD-10-CM

## 2021-11-15 DIAGNOSIS — M46.1 SACROILIITIS (HCC): ICD-10-CM

## 2021-11-15 DIAGNOSIS — G57.02 PIRIFORMIS SYNDROME OF LEFT SIDE: ICD-10-CM

## 2021-11-15 PROCEDURE — 20552 NJX 1/MLT TRIGGER POINT 1/2: CPT | Performed by: PHYSICAL MEDICINE & REHABILITATION

## 2021-11-15 PROCEDURE — NC001 PR NO CHARGE: Performed by: PHYSICAL MEDICINE & REHABILITATION

## 2021-11-15 PROCEDURE — 77002 NEEDLE LOCALIZATION BY XRAY: CPT | Performed by: PHYSICAL MEDICINE & REHABILITATION

## 2021-11-15 PROCEDURE — 27096 INJECT SACROILIAC JOINT: CPT | Performed by: PHYSICAL MEDICINE & REHABILITATION

## 2021-11-15 RX ORDER — BUPIVACAINE HCL/PF 2.5 MG/ML
10 VIAL (ML) INJECTION ONCE
Status: COMPLETED | OUTPATIENT
Start: 2021-11-15 | End: 2021-11-15

## 2021-11-15 RX ORDER — 0.9 % SODIUM CHLORIDE 0.9 %
10 VIAL (ML) INJECTION ONCE
Status: COMPLETED | OUTPATIENT
Start: 2021-11-15 | End: 2021-11-15

## 2021-11-15 RX ORDER — METHYLPREDNISOLONE ACETATE 80 MG/ML
80 INJECTION, SUSPENSION INTRA-ARTICULAR; INTRALESIONAL; INTRAMUSCULAR; PARENTERAL; SOFT TISSUE ONCE
Status: COMPLETED | OUTPATIENT
Start: 2021-11-15 | End: 2021-11-15

## 2021-11-15 RX ADMIN — SODIUM CHLORIDE 4 ML: 9 INJECTION, SOLUTION INTRAMUSCULAR; INTRAVENOUS; SUBCUTANEOUS at 08:46

## 2021-11-15 RX ADMIN — METHYLPREDNISOLONE ACETATE 80 MG: 80 INJECTION, SUSPENSION INTRA-ARTICULAR; INTRALESIONAL; INTRAMUSCULAR; PARENTERAL; SOFT TISSUE at 08:47

## 2021-11-15 RX ADMIN — Medication 4 ML: at 08:46

## 2021-11-15 RX ADMIN — BUPIVACAINE HYDROCHLORIDE 8 ML: 2.5 INJECTION, SOLUTION EPIDURAL; INFILTRATION; INTRACAUDAL at 08:47

## 2021-11-15 RX ADMIN — IOHEXOL 3 ML: 300 INJECTION, SOLUTION INTRAVENOUS at 08:47

## 2021-11-22 ENCOUNTER — OFFICE VISIT (OUTPATIENT)
Dept: FAMILY MEDICINE CLINIC | Facility: CLINIC | Age: 56
End: 2021-11-22
Payer: COMMERCIAL

## 2021-11-22 ENCOUNTER — TELEPHONE (OUTPATIENT)
Dept: PAIN MEDICINE | Facility: CLINIC | Age: 56
End: 2021-11-22

## 2021-11-22 VITALS
HEIGHT: 61 IN | OXYGEN SATURATION: 98 % | BODY MASS INDEX: 35.5 KG/M2 | WEIGHT: 188 LBS | HEART RATE: 82 BPM | SYSTOLIC BLOOD PRESSURE: 140 MMHG | RESPIRATION RATE: 16 BRPM | DIASTOLIC BLOOD PRESSURE: 96 MMHG

## 2021-11-22 DIAGNOSIS — I15.8 OTHER SECONDARY HYPERTENSION: ICD-10-CM

## 2021-11-22 DIAGNOSIS — M79.7 FIBROMYALGIA: ICD-10-CM

## 2021-11-22 DIAGNOSIS — Z23 NEED FOR VACCINATION: ICD-10-CM

## 2021-11-22 DIAGNOSIS — R10.2 PELVIC PAIN: ICD-10-CM

## 2021-11-22 DIAGNOSIS — E66.01 CLASS 2 SEVERE OBESITY DUE TO EXCESS CALORIES WITH SERIOUS COMORBIDITY AND BODY MASS INDEX (BMI) OF 35.0 TO 35.9 IN ADULT (HCC): ICD-10-CM

## 2021-11-22 DIAGNOSIS — F41.9 ANXIETY: ICD-10-CM

## 2021-11-22 DIAGNOSIS — F33.1 MODERATE EPISODE OF RECURRENT MAJOR DEPRESSIVE DISORDER (HCC): Primary | ICD-10-CM

## 2021-11-22 DIAGNOSIS — M85.80 OSTEOPENIA, UNSPECIFIED LOCATION: ICD-10-CM

## 2021-11-22 PROCEDURE — 99214 OFFICE O/P EST MOD 30 MIN: CPT | Performed by: FAMILY MEDICINE

## 2021-11-22 PROCEDURE — 90471 IMMUNIZATION ADMIN: CPT | Performed by: FAMILY MEDICINE

## 2021-11-22 PROCEDURE — 90682 RIV4 VACC RECOMBINANT DNA IM: CPT | Performed by: FAMILY MEDICINE

## 2021-11-22 RX ORDER — DIAZEPAM 5 MG/1
5 TABLET ORAL EVERY 12 HOURS PRN
Qty: 30 TABLET | Refills: 0 | Status: SHIPPED | OUTPATIENT
Start: 2021-11-22 | End: 2022-01-12

## 2021-11-22 RX ORDER — BUPROPION HYDROCHLORIDE 150 MG/1
150 TABLET ORAL EVERY MORNING
Qty: 30 TABLET | Refills: 5 | Status: SHIPPED | OUTPATIENT
Start: 2021-11-22 | End: 2022-01-12

## 2021-11-22 RX ORDER — VALSARTAN 160 MG/1
160 TABLET ORAL DAILY
Qty: 90 TABLET | Refills: 1 | Status: SHIPPED | OUTPATIENT
Start: 2021-11-22 | End: 2022-04-11

## 2021-11-30 ENCOUNTER — PROCEDURE VISIT (OUTPATIENT)
Dept: PAIN MEDICINE | Facility: CLINIC | Age: 56
End: 2021-11-30
Payer: COMMERCIAL

## 2021-11-30 VITALS
DIASTOLIC BLOOD PRESSURE: 75 MMHG | HEART RATE: 75 BPM | SYSTOLIC BLOOD PRESSURE: 125 MMHG | BODY MASS INDEX: 35.52 KG/M2 | WEIGHT: 188 LBS

## 2021-11-30 DIAGNOSIS — M70.62 TROCHANTERIC BURSITIS OF BOTH HIPS: Primary | ICD-10-CM

## 2021-11-30 DIAGNOSIS — M70.61 TROCHANTERIC BURSITIS OF BOTH HIPS: Primary | ICD-10-CM

## 2021-11-30 PROCEDURE — 20611 DRAIN/INJ JOINT/BURSA W/US: CPT | Performed by: PHYSICAL MEDICINE & REHABILITATION

## 2021-11-30 RX ORDER — METHYLPREDNISOLONE ACETATE 40 MG/ML
40 INJECTION, SUSPENSION INTRA-ARTICULAR; INTRALESIONAL; INTRAMUSCULAR; SOFT TISSUE ONCE
Status: COMPLETED | OUTPATIENT
Start: 2021-11-30 | End: 2021-11-30

## 2021-11-30 RX ORDER — BUPIVACAINE HYDROCHLORIDE 2.5 MG/ML
10 INJECTION, SOLUTION EPIDURAL; INFILTRATION; INTRACAUDAL ONCE
Status: COMPLETED | OUTPATIENT
Start: 2021-11-30 | End: 2021-11-30

## 2021-11-30 RX ADMIN — METHYLPREDNISOLONE ACETATE 40 MG: 40 INJECTION, SUSPENSION INTRA-ARTICULAR; INTRALESIONAL; INTRAMUSCULAR; SOFT TISSUE at 08:53

## 2021-11-30 RX ADMIN — BUPIVACAINE HYDROCHLORIDE 10 ML: 2.5 INJECTION, SOLUTION EPIDURAL; INFILTRATION; INTRACAUDAL at 08:52

## 2021-12-15 ENCOUNTER — TELEPHONE (OUTPATIENT)
Dept: PAIN MEDICINE | Facility: CLINIC | Age: 56
End: 2021-12-15

## 2021-12-21 DIAGNOSIS — M25.551 PAIN IN RIGHT HIP: ICD-10-CM

## 2021-12-21 DIAGNOSIS — G89.29 CHRONIC LEFT-SIDED LOW BACK PAIN WITH LEFT-SIDED SCIATICA: ICD-10-CM

## 2021-12-21 DIAGNOSIS — G57.01 PIRIFORMIS SYNDROME OF RIGHT SIDE: ICD-10-CM

## 2021-12-21 DIAGNOSIS — M54.42 CHRONIC LEFT-SIDED LOW BACK PAIN WITH LEFT-SIDED SCIATICA: ICD-10-CM

## 2021-12-21 RX ORDER — BACLOFEN 10 MG/1
10 TABLET ORAL 2 TIMES DAILY PRN
Qty: 40 TABLET | Refills: 0 | Status: SHIPPED | OUTPATIENT
Start: 2021-12-21 | End: 2022-03-02 | Stop reason: SDUPTHER

## 2021-12-27 ENCOUNTER — TELEPHONE (OUTPATIENT)
Dept: PSYCHIATRY | Facility: CLINIC | Age: 56
End: 2021-12-27

## 2021-12-30 ENCOUNTER — SOCIAL WORK (OUTPATIENT)
Dept: BEHAVIORAL/MENTAL HEALTH CLINIC | Facility: CLINIC | Age: 56
End: 2021-12-30
Payer: COMMERCIAL

## 2021-12-30 DIAGNOSIS — F33.1 MAJOR DEPRESSIVE DISORDER, RECURRENT, MODERATE (HCC): Primary | ICD-10-CM

## 2021-12-30 PROCEDURE — 90791 PSYCH DIAGNOSTIC EVALUATION: CPT | Performed by: COUNSELOR

## 2022-01-03 DIAGNOSIS — F33.1 MODERATE EPISODE OF RECURRENT MAJOR DEPRESSIVE DISORDER (HCC): ICD-10-CM

## 2022-01-03 DIAGNOSIS — M79.7 FIBROMYALGIA: ICD-10-CM

## 2022-01-03 RX ORDER — DOXEPIN HYDROCHLORIDE 75 MG/1
CAPSULE ORAL
Qty: 30 CAPSULE | Refills: 5 | Status: SHIPPED | OUTPATIENT
Start: 2022-01-03 | End: 2022-01-12

## 2022-01-04 ENCOUNTER — SOCIAL WORK (OUTPATIENT)
Dept: BEHAVIORAL/MENTAL HEALTH CLINIC | Facility: CLINIC | Age: 57
End: 2022-01-04
Payer: COMMERCIAL

## 2022-01-04 DIAGNOSIS — F33.1 MODERATE EPISODE OF RECURRENT MAJOR DEPRESSIVE DISORDER (HCC): Primary | ICD-10-CM

## 2022-01-04 PROCEDURE — 90834 PSYTX W PT 45 MINUTES: CPT | Performed by: COUNSELOR

## 2022-01-04 NOTE — BH TREATMENT PLAN
Follow up plan accepted by patient - working on one issue at a time to avoid rising anxiety, focusing on relaxation - examples and recommendation given for tapping, pelvic floor relaxation, and guided imagery/meditation

## 2022-01-04 NOTE — PSYCH
Psychotherapy Provided: Individual Psychotherapy 55 minutes     Length of time in session: 55 minutes, follow up in a week week    Encounter Diagnosis     ICD-10-CM    1  Moderate episode of recurrent major depressive disorder ([facility] [address] )  F33 1        Goals addressed in session: Goal 1     Pain:      moderate to severe    9    Current suicide risk : Low     Passive ideation    Behavioral Health Treatment Plan [facility]: Diagnosis and Treatment Plan explained to Darion [place] relates understanding diagnosis and is agreeable to Treatment Plan   Yes

## 2022-01-05 NOTE — PROGRESS NOTES
"Routing refill request to provider for review/approval because:  ACT score out of date  Patient needs to be seen because it has been more than 1 year since last office visit.    Last Written Prescription Date:  11/4/21  Last Fill Quantity: 54 g,  # refills: 0   Last office visit provider:  12/2/20     Requested Prescriptions   Pending Prescriptions Disp Refills     albuterol (PROAIR HFA/PROVENTIL HFA/VENTOLIN HFA) 108 (90 Base) MCG/ACT inhaler [Pharmacy Med Name: ALBUTEROL HFA INH(200 PUFFS)18GM] 54 g 0     Sig: INHALE 2 PUFFS BY MOUTH EVERY 4 HOURS AS NEEDED FOR WHEEZING       Asthma Maintenance Inhalers - Anticholinergics Failed - 1/3/2022  3:38 AM        Failed - Asthma control assessment score within normal limits in last 6 months     Please review ACT score.           Failed - Recent (6 mo) or future (30 days) visit within the authorizing provider's specialty     Patient had office visit in the last 6 months or has a visit in the next 30 days with authorizing provider or within the authorizing provider's specialty.  See \"Patient Info\" tab in inbasket, or \"Choose Columns\" in Meds & Orders section of the refill encounter.            Passed - Patient is age 12 years or older        Passed - Medication is active on med list       Short-Acting Beta Agonist Inhalers Protocol  Failed - 1/3/2022  3:38 AM        Failed - Asthma control assessment score within normal limits in last 6 months     Please review ACT score.           Failed - Recent (6 mo) or future (30 days) visit within the authorizing provider's specialty     Patient had office visit in the last 6 months or has a visit in the next 30 days with authorizing provider or within the authorizing provider's specialty.  See \"Patient Info\" tab in inbasket, or \"Choose Columns\" in Meds & Orders section of the refill encounter.            Passed - Patient is age 12 or older        Passed - Medication is active on med list             Anthony Santos RN 01/05/22 10:58 " Hematology/Oncology Outpatient Follow- up Note  Angela Clifford 64 y o  female MRN: @ Encounter: [id number]        Date:  8/23/2021      Assessment / Plan:  Iron deficiency anemia and B12 deficiency secondary to decreased absorption  She is status post gastric bypass surgery  She has received IV iron in the past with good tolerance  Venofer 300 milligrams x 6 doses along with B12 1000 micro grams IM x6 doses administered 3/2021  Ferritin improved from 1/10/2021 to 245 5/2021  Iron panel from 8/23/2021 identified saturation 25%, ferritin 170  Currently patient is iron replete  Vitamin B12 sufficient  Recommend repeat CBCD and iron panel in approximately 6 months                   HPI:  Angela Clifford is a 64 y o  seen for initial consultation 2/22/2021 at the referral of Mason Guadarrama MD regarding iron deficiency        1/18/2021 hemoglobin 12 7, MCV of 96, white blood cell count 5 48,  62% neutrophils, 25% lymphocytes, 9% monocytes, platelets 341  CMP normal      Vitamin B12 316, ferritin of 10  Normal hepatitis C antibody and HIV antigens/ antibody     5/13/20:   Hemoglobin 13 6, MCV 91 6, white blood cell count 5 2, normal differential, platelets 354     She is status post gastric bypass surgery approximately 2006       She underwent upper endoscopy and colonoscopy 12/31/2019 by Dr Batsheva Viramontes  The anastomosis was noted to look good  Internal hemorrhoids noted in 1 small polyp was removed  Repeat colonoscopy in 5 years was recommended,     She has received [facility] the past [facility] with good tolerance  Cannot recall when her last treatment was but it was many years ago      States she does not like going to doctors     She does have chronic back pain      Spends her free time wood working and being creative     She has been postmenopausal for many years  Menses had always been erratic  She has fatigue    Denies any headaches, dizziness, chest AM   pain or shortness of breath  No palpitations  No GI or  issues  She denies any melena, hematochezia or other sources of blood loss      She does take B12 orally faithfully     Interval History:    5/11/2021 ferritin 245  Vitamin B12 1851  8/23/21 Hemoglobin 13 5, MCV 96  Iron panel and vitamin B12 are pending      Test Results:        Labs:   Lab Results   Component Value Date    HGB 13 5 08/23/2021    HCT 40 2 08/23/2021    MCV 96 08/23/2021     08/23/2021    WBC 7 98 08/23/2021    NRBC 0 01/18/2021     Lab Results   Component Value Date    K 4 1 08/23/2021     08/23/2021    CO2 30 08/23/2021    BUN 14 08/23/2021    CREATININE 0 70 08/23/2021    GLUF 72 08/23/2021    CALCIUM 8 6 08/23/2021    AST 15 08/23/2021    ALT 18 08/23/2021    ALKPHOS 56 3 08/23/2021    EGFR 97 08/23/2021       Imaging: MRI lumbar spine wo contrast    Result Date: 8/11/2021  Narrative: MRI LUMBAR SPINE WITHOUT CONTRAST INDICATION: M25 552: Pain in left hip M79 7: Fibromyalgia M54 42: Lumbago with sciatica, left side G89 29: Other chronic pain  Left-sided low back pain radiating into the left hip  COMPARISON:  1/25/2021 TECHNIQUE:  Sagittal T1, sagittal T2, sagittal inversion recovery, axial T1 and axial T2, coronal T2   IMAGE QUALITY:  Diagnostic FINDINGS: VERTEBRAL BODIES:  There is a transitional lumbosacral junction  Trace grade 1 anterolisthesis of L4 on L5  Mild dextroscoliosis midlumbar spine, centered at the L3 level  Scattered degenerative endplate changes  No focally suspicious marrow lesions  No bone marrow edema or compression abnormality  Hemangioma noted more pronounced in the T10 then the L1 vertebra  SACRUM:  Scattered degenerative endplate changes  No focally suspicious marrow lesions  No bone marrow edema or compression abnormality  DISTAL CORD AND CONUS:  Normal size and signal within the distal cord and conus  The conus medullaris terminates at the L1 level   PARASPINAL SOFT TISSUES: Paraspinal soft tissues are unremarkable  LOWER THORACIC DISC SPACES:  T10-T11: There is no focal disk herniation, central canal or neural foraminal narrowing  T11-T12: Tiny central disc herniation, protrusion type  No significant central canal or neural foraminal narrowing  T12-L1: There is no focal disk herniation, central canal or neural foraminal narrowing  LUMBAR DISC SPACES: L1-L2:  There is no focal disk herniation, central canal or neural foraminal narrowing  Bilateral facet hypertrophy noted  L2-L3:  There is a right neural foraminal disc herniation, protrusion type  Mild right neural foraminal narrowing  Central canal and left neural foramen patent  L3-L4:  There is a diffuse disk bulge  No significant central canal or neural foraminal narrowing  Bilateral facet hypertrophy noted  L4-L5:  Mild uncovering of the intervertebral disc space  There is bilateral facet hypertrophy  There is infolding ligamentum flavum  Moderate tricompartmental narrowing noted  L5-S1:  There is bilateral facet hypertrophy  There is a left neural foraminal disc protrusion  Moderate to severe left neural foraminal narrowing  Mild central canal narrowing  Moderate right neural foraminal narrowing  Impression: 1  Multilevel spondylosis most pronounced on the left at L5-S1  2   Trace grade 1 anterolisthesis of L4 on L5  3  There is a transitional vertebral body at the lumbosacral junction  If spinal intervention is indicated, correlation with radiography recommended  Workstation performed: [id number]           ROS:  As mentioned in HPI & Interval History otherwise 14 point ROS negative  Allergies: No Known Allergies  Current Medications: Reviewed  PMH/FH/SH:  Reviewed      Physical Exam:    There is no height or weight on file to calculate BSA      Ht Readings from Last 3 Encounters:   08/17/21 5' 1" (1 549 m)   07/28/21 5' 1" (1 549 m)   07/22/21 5' 1" (1 549 m)        Wt Readings from Last 3 Encounters: 08/17/21 83 9 kg (185 lb)   07/28/21 84 kg (185 lb 3 2 oz)   07/22/21 85 3 kg (188 lb)        Temp Readings from Last 3 Encounters:   05/18/21 97 6 °F (36 4 °C) (Tympanic)   04/09/21 97 5 °F (36 4 °C) (Temporal)   03/26/21 98 2 °F (36 8 °C) (Temporal)        BP Readings from Last 3 Encounters:   08/17/21 135/71   07/22/21 142/80   05/18/21 122/70           Physical Exam  Constitutional:       Appearance: She is well-developed  HENT:      Head: Normocephalic and atraumatic  Cardiovascular:      Rate and Rhythm: Normal rate  Pulmonary:      Effort: Pulmonary effort is normal  No respiratory distress  Skin:     General: Skin is warm and dry  Neurological:      Mental Status: She is alert     Psychiatric:         Behavior: Behavior normal                Emergency Contacts:    Extended Emergency Contact Information  Primary Emergency Contact: NO,ONE  Mobile Phone: [phone]  Relation: Other

## 2022-01-12 DIAGNOSIS — M79.7 FIBROMYALGIA: ICD-10-CM

## 2022-01-12 RX ORDER — DIAZEPAM 5 MG/1
5 TABLET ORAL EVERY 12 HOURS PRN
Qty: 30 TABLET | Refills: 0 | Status: SHIPPED | OUTPATIENT
Start: 2022-01-12 | End: 2022-02-03 | Stop reason: SDUPTHER

## 2022-01-13 ENCOUNTER — TELEMEDICINE (OUTPATIENT)
Dept: BEHAVIORAL/MENTAL HEALTH CLINIC | Facility: CLINIC | Age: 57
End: 2022-01-13
Payer: COMMERCIAL

## 2022-01-13 DIAGNOSIS — F33.1 MODERATE RECURRENT MAJOR DEPRESSION (HCC): Primary | ICD-10-CM

## 2022-01-13 PROCEDURE — 90834 PSYTX W PT 45 MINUTES: CPT | Performed by: COUNSELOR

## 2022-01-13 NOTE — BH TREATMENT PLAN
Patient's plan has been created during the first visit and discussed now- she is does well in counteracting depression and anxiety

## 2022-01-13 NOTE — PSYCH
Psychotherapy Provided: Individual Psychotherapy 45 minutes       Length of time in session: 45 minutes, follow up in 1 WEEK    Patient     No diagnosis found  Goals addressed in session: Anxiety, depression Individual Psychotherapy     Pain:  Back pain    moderate to severe    10+    Current suicide risk : Low         Behavioral Health Treatment Plan [facility]: Diagnosis and Treatment Plan explained to [facility] relates understanding diagnosis and is agreeable to Treatment Plan   Yes

## 2022-01-13 NOTE — PSYCH
Virtual Regular Visit    Verification of patient location:    Patient is located in the following state in which I hold an active license [place]      Assessment/Plan:    Problem List Items Addressed This Visit     None          Goals addressed in session: Goal 1          Reason for visit is No chief complaint on file  Encounter provider [facility]    Provider located at [address] [place] [id number] [phone]      Recent Visits  No visits were found meeting these conditions  Showing recent visits within past 7 days and meeting all other requirements  Future Appointments  No visits were found meeting these conditions  Showing future appointments within next 150 days and meeting all other requirements       The patient was identified by name and date of birth  Juan Devine was informed that this is a telemedicine visit and that the visit is being conducted through phone due to she did not manage to connect to video  Telemedicine participants:[id number] She acknowledged consent and understanding of privacy and security of the video platform  The patient has agreed to participate and understands they can discontinue the visit at any time  Patient is aware this is a billable service  Corrine Singh is a 64 y o  female    HPI     Past Medical History:   Diagnosis Date    Anxiety     Fibromyalgia     Medical history unknown     Osteopenia        Past Surgical History:   Procedure Laterality Date    CARPAL TUNNEL RELEASE Bilateral     21 y o  with left ulnar nerve release        CARPAL TUNNEL RELEASE Bilateral      SECTION      ELBOW SURGERY      Tennis elbow    GASTRIC BYPASS      REDUCTION MAMMAPLASTY Bilateral     WISDOM TOOTH EXTRACTION         Current Outpatient Medications   Medication Sig Dispense Refill    ascorbic acid (VITAMIN C) 500 MG tablet Take 500 mg by mouth daily      baclofen 10 mg tablet Take 1 tablet (10 mg total) by mouth 2 (two) times a day as needed for muscle spasms 40 tablet 0    Cholecalciferol 25 MCG (1000 UT) tablet Take 1,000 Units by mouth daily      [facility] B-12 500 MCG tablet TAKE 2 TABLETS (1,000 MCG TOTAL) BY MOUTH DAILY 180 tablet 1    diazepam (VALIUM) 5 mg tablet TAKE 1 TABLET (5 MG TOTAL) BY MOUTH EVERY 12 (TWELVE) HOURS AS NEEDED FOR ANXIETY, SLEEP OR MUSCLE SPASMS 30 tablet 0    doxepin (SINEquan) 150 MG capsule Take 1 capsule (150 mg total) by mouth daily at bedtime 90 capsule 1    gabapentin (Neurontin) 600 MG tablet Take 1 tablet (600 mg total) by mouth 3 (three) times a day 90 tablet 2    valsartan (DIOVAN) 160 mg tablet Take 1 tablet (160 mg total) by mouth daily 90 tablet 1     No current facility-administered medications for this visit  No Known Allergies    Review of Systems    Video Exam    There were no vitals filed for this visit  Physical Exam     I spent 45 minutes directly with the patient during this visit    [facility] verbally agrees to participate in [employer]  Pt is aware that [employer] could be limited without vital signs or the ability to perform a full hands-on physical Aliciaflower Dennis understands she or the provider may request at any time to terminate the video visit and request the patient to seek care or treatment in person

## 2022-01-19 ENCOUNTER — OFFICE VISIT (OUTPATIENT)
Dept: PAIN MEDICINE | Facility: CLINIC | Age: 57
End: 2022-01-19
Payer: COMMERCIAL

## 2022-01-19 VITALS
HEART RATE: 91 BPM | BODY MASS INDEX: 37.03 KG/M2 | SYSTOLIC BLOOD PRESSURE: 145 MMHG | DIASTOLIC BLOOD PRESSURE: 81 MMHG | WEIGHT: 196 LBS

## 2022-01-19 DIAGNOSIS — M48.061 LUMBAR FORAMINAL STENOSIS: ICD-10-CM

## 2022-01-19 DIAGNOSIS — G89.29 CHRONIC LEFT-SIDED LOW BACK PAIN WITH LEFT-SIDED SCIATICA: ICD-10-CM

## 2022-01-19 DIAGNOSIS — M48.062 SPINAL STENOSIS OF LUMBAR REGION WITH NEUROGENIC CLAUDICATION: ICD-10-CM

## 2022-01-19 DIAGNOSIS — M54.42 CHRONIC LEFT-SIDED LOW BACK PAIN WITH LEFT-SIDED SCIATICA: ICD-10-CM

## 2022-01-19 DIAGNOSIS — G89.4 CHRONIC PAIN SYNDROME: Primary | ICD-10-CM

## 2022-01-19 PROCEDURE — 99214 OFFICE O/P EST MOD 30 MIN: CPT | Performed by: PHYSICAL MEDICINE & REHABILITATION

## 2022-01-19 NOTE — PATIENT INSTRUCTIONS
Lumbar Radiculopathy   WHAT YOU NEED TO KNOW:   Lumbar radiculopathy is a painful condition that happens when a nerve in your lumbar spine (lower back) is pinched or irritated  Nerves control feeling and movement in your body  You may have numbness or pain that shoots down from your lower back towards your foot  DISCHARGE INSTRUCTIONS:   Medicines:   · Medicines:     ? NSAIDs , such as ibuprofen, help decrease swelling, pain, and fever  This medicine is available with or without a doctor's order  NSAIDs can cause stomach bleeding or kidney problems in certain people  If you take blood thinner medicine, always ask your healthcare provider if NSAIDs are safe for you  Always read the medicine label and follow directions  ? Muscle relaxers  help decrease pain and muscle spasms  ? Opioids: This is a strong medicine given to reduce severe pain  It is also called narcotic pain medicine  Take this medicine exactly as directed by your healthcare provider  ? Oral steroids: Steroids may also be given to reduce pain and swelling  ? Take your medicine as directed  Contact your healthcare provider if you think your medicine is not helping or if you have side effects  Tell him of her if you are allergic to any medicine  Keep a list of the medicines, vitamins, and herbs you take  Include the amounts, and when and why you take them  Bring the list or the pill bottles to follow-up visits  Carry your medicine list with you in case of an emergency  Follow up with your healthcare provider or spine specialist within 1 to 3 weeks:  After your first follow-up appointment, return to your healthcare provider or spine specialist every 2 weeks until you have healed  Ask for information about physical therapy for your condition  Write down your questions so you remember to ask them during your visits  Physical therapy:  You may need physical therapy to improve your condition   Your physical therapist may teach you certain exercises to improve posture (the way you stand and sit), flexibility, and strength in your lower back  Self care:   · Stay active: It is best to be active when you have lumbar radiculopathy  Your physical therapist or healthcare provider may tell you to take walks to ease yourself back into your daily routine  Avoid long periods of bed rest  Bed rest could worsen your symptoms  Do not move in ways that increase your pain  Ask for more information about the best ways to stay active  · Use ice or heat packs:  Use ice or heat packs as directed on the sore area of your body to decrease the pain and swelling  Put ice in a plastic bag covered with a towel on your low back  Cover heated items with a towel to avoid burns  Use ice and heat as directed  · Avoid heavy lifting: Your condition may worsen if you lift heavy things  Avoid lifting if possible  · Maintain a healthy weight:  Excess body weight may strain your back  Talk with your healthcare provider about ways to lose excess weight if you are overweight  Contact your healthcare provider or spine specialist if:   · Your pain does not improve within 1 to 3 weeks after treatment  · Your pain and weakness keep you from your normal activities at work, home, or school  · You lose more than 10 pounds in 6 months without trying  · You become depressed or sad because of the pain  · You have questions or concerns about your condition or care  Return to the emergency department if:   · You have a fever greater than 100 4°F for longer than 2 days  · You have new, severe back or leg pain, or your pain spreads to both legs  · You have any new signs of numbness or weakness, especially in your lower back, legs, arms, or genital area  · You have new trouble controlling your urine and bowel movements  · You do not feel like your bladder empties when you urinate      © Copyright [employer] 2021 Information is for End User's use only and may not be sold, redistributed or otherwise used for commercial purposes  All illustrations and images included in CareNotes® are the copyrighted property of A D A M [employer] Inc  or Jethro Eller  The above information is an  only  It is not intended as medical advice for individual conditions or treatments  Talk to your doctor, nurse or pharmacist before following any medical regimen to see if it is safe and effective for you

## 2022-01-19 NOTE — PROGRESS NOTES
Assessment:  1  Chronic pain syndrome    2  Chronic left-sided low back pain with left-sided sciatica    3  Lumbar foraminal stenosis    4  Spinal stenosis of lumbar region with neurogenic claudication        Plan:  Ms Nereida Rosenberg is a pleasant 77-year-old female who presents for follow-up and re-evaluation regarding ongoing low back pain with radiating symptoms into the right worse than left lower extremity  We previously performed bilateral sacroiliac joint injections which provided minimal relief in her pain  She reports the radicular symptoms have gotten progressively worse and she is demonstrating both clinical and diagnostic evidence of lumbar radiculopathy likely in relation to the multilevel degenerative disc disease with varying levels of moderate to severe foraminal narrowing most notable at L5-S1  At this time we will plan for bilateral L5-S1 TFESI under fluoro guidance  All questions answered, patient is agreeable with plan  Complete risks and benefits including bleeding, infection, tissue reaction, nerve injury and allergic reaction were discussed  The approach was demonstrated using models and literature was provided  Verbal and written consent was obtained  My impressions and treatment recommendations were discussed in detail with the patient who verbalized understanding and had no further questions  Discharge instructions were provided  I personally saw and examined the patient and I agree with the above discussed plan of care  Orders Placed This Encounter   Procedures    FL spine and pain procedure     Standing Status:   Future     Standing Expiration Date:   1/19/2026     Order Specific Question:   Reason for Exam:     Answer:   Bilateral L5-S1 TFESI     Order Specific Question:   Is the patient pregnant? Answer:   No     Order Specific Question:   Anticoagulant hold needed? Answer:   No     No orders of the defined types were placed in this encounter        History of Present Illness:  Reymundo Giraldo is a 64 y o  female who presents for a follow up office visit in regards to Back Pain and Buttocks Pain (right side)  The patients current symptoms include low back pain with radiating symptoms into the right leg currently rated 8/10 and interfering with daily activities  Pain is described as a constant throbbing, shooting, aching, stabbing sensation that is worse in the morning  Presents today for follow-up and re-evaluation  I have personally reviewed and/or updated the patient's past medical history, past surgical history, family history, social history, current medications, allergies, and vital signs today  Review of Systems   Respiratory: Negative for shortness of breath  Cardiovascular: Negative for chest pain  Gastrointestinal: Negative for constipation, diarrhea, nausea and vomiting  Musculoskeletal: Positive for back pain, gait problem and joint swelling  Negative for arthralgias and myalgias  Skin: Negative for rash  Neurological: Positive for weakness  Negative for dizziness and seizures  All other systems reviewed and are negative        Patient Active Problem List   Diagnosis    Colon polyp    Internal hemorrhoids    Family history of colorectal cancer    Vitamin D deficiency    Iron deficiency    H/O gastric bypass    B12 deficiency    Moderate episode of recurrent major depressive disorder (HCC)    Fibromyalgia    Chronic left-sided low back pain with left-sided sciatica    Bilateral hip pain    Obesity, Class II, BMI 35-39 9    Encounter for surgical aftercare following surgery of digestive system    Postsurgical malabsorption    Constipation    Bariatric surgery status    Sacroiliitis (HCC)    Piriformis syndrome of left side    Piriformis syndrome of right side    Trochanteric bursitis of both hips    Lumbar facet arthropathy    Osteopenia    Anxiety       Past Medical History:   Diagnosis Date    Anxiety     Fibromyalgia  Medical history unknown     Osteopenia        Past Surgical History:   Procedure Laterality Date    CARPAL TUNNEL RELEASE Bilateral     21 y o  with left ulnar nerve release        CARPAL TUNNEL RELEASE Bilateral      SECTION      ELBOW SURGERY      Tennis elbow    GASTRIC BYPASS      REDUCTION MAMMAPLASTY Bilateral     WISDOM TOOTH EXTRACTION         Family History   Problem Relation Age of Onset    Ovarian cancer Mother     Heart disease Mother     Colon cancer Father     No Known Problems Maternal Grandmother     No Known Problems Maternal Grandfather     Diabetes Paternal Grandmother     No Known Problems Paternal Grandfather     No Known Problems Son     Leukemia Maternal Uncle     Diabetes Paternal Aunt     Diabetes Paternal Uncle        Social History     Occupational History    Not on file   Tobacco Use    Smoking status: Never Smoker    Smokeless tobacco: Never Used   Vaping Use    Vaping Use: Never used   Substance and Sexual Activity    Alcohol use: No    Drug use: Never    Sexual activity: Not on file       Current Outpatient Medications on File Prior to Visit   Medication Sig    ascorbic acid (VITAMIN C) 500 MG tablet Take 500 mg by mouth daily    baclofen 10 mg tablet Take 1 tablet (10 mg total) by mouth 2 (two) times a day as needed for muscle spasms    Cholecalciferol 25 MCG (1000 UT) tablet Take 1,000 Units by mouth daily    [facility]S B-12 500 MCG tablet TAKE 2 TABLETS (1,000 MCG TOTAL) BY MOUTH DAILY    diazepam (VALIUM) 5 mg tablet TAKE 1 TABLET (5 MG TOTAL) BY MOUTH EVERY 12 (TWELVE) HOURS AS NEEDED FOR ANXIETY, SLEEP OR MUSCLE SPASMS    doxepin (SINEquan) 150 MG capsule Take 1 capsule (150 mg total) by mouth daily at bedtime    gabapentin (Neurontin) 600 MG tablet Take 1 tablet (600 mg total) by mouth 3 (three) times a day    valsartan (DIOVAN) 160 mg tablet Take 1 tablet (160 mg total) by mouth daily    ergocalciferol (VITAMIN D2) 50,000 units Take 1 capsule (50,000 Units total) by mouth every 28 days     No current facility-administered medications on file prior to visit  No Known Allergies    Physical Exam:    /81   Pulse 91   Wt 88 9 kg (196 lb)   BMI 37 03 kg/m²     Constitutional:normal, well developed, well nourished, alert, in no distress and non-toxic and no overt pain behavior    Eyes:anicteric  HEENT:grossly intact  Neck:supple, symmetric, trachea midline and no masses   Pulmonary:even and unlabored  Cardiovascular:No edema or pitting edema present  Skin:Normal without rashes or lesions and well hydrated  Psychiatric:Mood and affect appropriate  Neurologic:Cranial Nerves II-XII grossly intact  Musculoskeletal:antalgic    Imaging

## 2022-01-31 ENCOUNTER — APPOINTMENT (OUTPATIENT)
Dept: LAB | Facility: CLINIC | Age: 57
End: 2022-01-31
Payer: COMMERCIAL

## 2022-01-31 ENCOUNTER — OFFICE VISIT (OUTPATIENT)
Dept: FAMILY MEDICINE CLINIC | Facility: CLINIC | Age: 57
End: 2022-01-31
Payer: COMMERCIAL

## 2022-01-31 VITALS
OXYGEN SATURATION: 98 % | DIASTOLIC BLOOD PRESSURE: 84 MMHG | RESPIRATION RATE: 16 BRPM | HEART RATE: 76 BPM | BODY MASS INDEX: 36.44 KG/M2 | SYSTOLIC BLOOD PRESSURE: 126 MMHG | HEIGHT: 61 IN | WEIGHT: 193 LBS

## 2022-01-31 DIAGNOSIS — M54.50 CHRONIC RIGHT-SIDED LOW BACK PAIN WITHOUT SCIATICA: ICD-10-CM

## 2022-01-31 DIAGNOSIS — I15.8 OTHER SECONDARY HYPERTENSION: ICD-10-CM

## 2022-01-31 DIAGNOSIS — Z98.84 H/O GASTRIC BYPASS: ICD-10-CM

## 2022-01-31 DIAGNOSIS — K91.2 POSTSURGICAL MALABSORPTION: ICD-10-CM

## 2022-01-31 DIAGNOSIS — E53.8 B12 DEFICIENCY: ICD-10-CM

## 2022-01-31 DIAGNOSIS — F33.1 MODERATE EPISODE OF RECURRENT MAJOR DEPRESSIVE DISORDER (HCC): ICD-10-CM

## 2022-01-31 DIAGNOSIS — G89.29 CHRONIC RIGHT-SIDED LOW BACK PAIN WITHOUT SCIATICA: ICD-10-CM

## 2022-01-31 DIAGNOSIS — Z00.00 WELL ADULT EXAM: Primary | ICD-10-CM

## 2022-01-31 DIAGNOSIS — F41.9 ANXIETY: ICD-10-CM

## 2022-01-31 DIAGNOSIS — E61.1 IRON DEFICIENCY: ICD-10-CM

## 2022-01-31 DIAGNOSIS — M25.559 HIP PAIN: ICD-10-CM

## 2022-01-31 DIAGNOSIS — E66.01 CLASS 2 SEVERE OBESITY DUE TO EXCESS CALORIES WITH SERIOUS COMORBIDITY AND BODY MASS INDEX (BMI) OF 35.0 TO 35.9 IN ADULT (HCC): ICD-10-CM

## 2022-01-31 LAB
BASOPHILS # BLD AUTO: 0.03 THOUSANDS/ΜL (ref 0–0.1)
BASOPHILS NFR BLD AUTO: 1 % (ref 0–1)
CHOLEST SERPL-MCNC: 198 MG/DL
EOSINOPHIL # BLD AUTO: 0.14 THOUSAND/ΜL (ref 0–0.61)
EOSINOPHIL NFR BLD AUTO: 2 % (ref 0–6)
ERYTHROCYTE [DISTWIDTH] IN BLOOD BY AUTOMATED COUNT: 11.2 % (ref 11.6–15.1)
FERRITIN SERPL-MCNC: 162 NG/ML (ref 8–388)
HCT VFR BLD AUTO: 40 % (ref 34.8–46.1)
HDLC SERPL-MCNC: 98 MG/DL
HGB BLD-MCNC: 13.3 G/DL (ref 11.5–15.4)
IMM GRANULOCYTES # BLD AUTO: 0.02 THOUSAND/UL (ref 0–0.2)
IMM GRANULOCYTES NFR BLD AUTO: 0 % (ref 0–2)
IRON SATN MFR SERPL: 35 % (ref 15–50)
IRON SERPL-MCNC: 103 UG/DL (ref 50–170)
LDLC SERPL CALC-MCNC: 88 MG/DL (ref 0–100)
LYMPHOCYTES # BLD AUTO: 1.82 THOUSANDS/ΜL (ref 0.6–4.47)
LYMPHOCYTES NFR BLD AUTO: 31 % (ref 14–44)
MCH RBC QN AUTO: 31.9 PG (ref 26.8–34.3)
MCHC RBC AUTO-ENTMCNC: 33.3 G/DL (ref 31.4–37.4)
MCV RBC AUTO: 96 FL (ref 82–98)
MONOCYTES # BLD AUTO: 0.56 THOUSAND/ΜL (ref 0.17–1.22)
MONOCYTES NFR BLD AUTO: 10 % (ref 4–12)
NEUTROPHILS # BLD AUTO: 3.35 THOUSANDS/ΜL (ref 1.85–7.62)
NEUTS SEG NFR BLD AUTO: 56 % (ref 43–75)
NRBC BLD AUTO-RTO: 0 /100 WBCS
PLATELET # BLD AUTO: 225 THOUSANDS/UL (ref 149–390)
PMV BLD AUTO: 9.8 FL (ref 8.9–12.7)
RBC # BLD AUTO: 4.17 MILLION/UL (ref 3.81–5.12)
TIBC SERPL-MCNC: 293 UG/DL (ref 250–450)
TRIGL SERPL-MCNC: 62 MG/DL
VIT B12 SERPL-MCNC: 475 PG/ML (ref 100–900)
WBC # BLD AUTO: 5.92 THOUSAND/UL (ref 4.31–10.16)

## 2022-01-31 PROCEDURE — 99396 PREV VISIT EST AGE 40-64: CPT | Performed by: FAMILY MEDICINE

## 2022-01-31 PROCEDURE — 80061 LIPID PANEL: CPT

## 2022-01-31 PROCEDURE — 85025 COMPLETE CBC W/AUTO DIFF WBC: CPT

## 2022-01-31 PROCEDURE — 36415 COLL VENOUS BLD VENIPUNCTURE: CPT

## 2022-01-31 PROCEDURE — 82607 VITAMIN B-12: CPT

## 2022-01-31 PROCEDURE — 82728 ASSAY OF FERRITIN: CPT

## 2022-01-31 PROCEDURE — 83550 IRON BINDING TEST: CPT

## 2022-01-31 PROCEDURE — 83540 ASSAY OF IRON: CPT

## 2022-01-31 NOTE — PROGRESS NOTES
Assessment/Plan:    Try the erum if that doesn't help I would get to chiro   For a tx   Cont with psych   I hope hearing going well for disability   Cont with heme for iron infusions      1  Well adult exam    2  Moderate episode of recurrent major depressive disorder ([address] )    3  Chronic right-sided low back pain without sciatica    4  Hip pain    5  Iron deficiency    6  H/O gastric bypass    7  Anxiety         Subjective:      Patient ID: Casey Calzada is a 64 y o  female  HPI  Seeing psych cont with same meds going ok   PM for ERUM  Coming up soon   Right sided   Going for hearing on march 1st   Can hear her back pop and that hip is getting stuck     Seeing heme for iron infusions    CPS is checking her grandkid now thankfully      The following portions of the patient's history were reviewed and updated as appropriate: allergies, current medications, past family history, past medical history, past social history, past surgical history and problem list     Review of Systems   Constitutional: Negative for fever and unexpected weight change  HENT: Negative for nosebleeds and trouble swallowing  Eyes: Negative for visual disturbance  Respiratory: Negative for chest tightness and shortness of breath  Cardiovascular: Negative for chest pain, palpitations and leg swelling  Gastrointestinal: Negative for abdominal pain, constipation, diarrhea and nausea  Endocrine: Negative for cold intolerance  Genitourinary: Negative for dysuria and urgency  Musculoskeletal: Positive for arthralgias, back pain and gait problem  Negative for joint swelling and myalgias  Skin: Negative for rash  Neurological: Negative for tremors, seizures and syncope  Hematological: Does not bruise/bleed easily  Psychiatric/Behavioral: Positive for behavioral problems, dysphoric mood and sleep disturbance  Negative for hallucinations and suicidal ideas  The patient is nervous/anxious            Objective:      /84 (BP Location: Left arm, Patient Position: Sitting, Cuff Size: Standard)   Pulse 76   Resp 16   Ht 5' 1" (1 549 m)   Wt 87 5 kg (193 lb)   SpO2 98%   BMI 36 47 kg/m²     No visits with results within 2 Week(s) from this visit  Latest known visit with results is:   Appointment on 08/23/2021   Component Date Value    WBC 08/23/2021 7 98     RBC 08/23/2021 4 21     Hemoglobin 08/23/2021 13 5     Hematocrit 08/23/2021 40 2     MCV 08/23/2021 96     MCH 08/23/2021 32 1     MCHC 08/23/2021 33 6     RDW 08/23/2021 11 3*    MPV 08/23/2021 10 2     Platelets 01/41/4346 236     Neutrophils Relative 08/23/2021 71     Immat GRANS % 08/23/2021 0     Lymphocytes Relative 08/23/2021 18     Monocytes Relative 08/23/2021 9     Eosinophils Relative 08/23/2021 2     Basophils Relative 08/23/2021 0     Neutrophils Absolute 08/23/2021 5 69     Immature Grans Absolute 08/23/2021 0 00     Lymphocytes Absolute 08/23/2021 1 40     Monocytes Absolute 08/23/2021 0 71     Eosinophils Absolute 08/23/2021 0 16     Basophils Absolute 08/23/2021 0 02     Sodium 08/23/2021 141     Potassium 08/23/2021 4 1     Chloride 08/23/2021 106     CO2 08/23/2021 30     ANION GAP 08/23/2021 5     BUN 08/23/2021 14     Creatinine 08/23/2021 0 70     Glucose, Fasting 08/23/2021 72     Calcium 08/23/2021 8 6     AST 08/23/2021 15     ALT 08/23/2021 18     Alkaline Phosphatase 08/23/2021 56 3     Total Protein 08/23/2021 6 6     Albumin 08/23/2021 3 7     Total Bilirubin 08/23/2021 0 31     eGFR 08/23/2021 97     Vitamin B-12 08/23/2021 579     Iron Saturation 08/23/2021 25     TIBC 08/23/2021 295     Iron 08/23/2021 74     Ferritin 08/23/2021 170           Physical Exam  Vitals and nursing note reviewed  Constitutional:       General: She is in acute distress (cant sit down has to bend and move to stay comfortable )  Appearance: She is well-developed  HENT:      Head: Normocephalic and atraumatic  Cardiovascular:      Rate and Rhythm: Normal rate and regular rhythm  Heart sounds: Normal heart sounds  No murmur heard  Pulmonary:      Effort: Pulmonary effort is normal       Breath sounds: Normal breath sounds  No wheezing or rales  Abdominal:      General: Bowel sounds are normal  There is no distension  Palpations: Abdomen is soft  Tenderness: There is no abdominal tenderness  Musculoskeletal:         General: Tenderness present  Normal range of motion  Cervical back: Normal range of motion and neck supple  Lymphadenopathy:      Cervical: No cervical adenopathy  Skin:     General: Skin is warm and dry  Capillary Refill: Capillary refill takes less than 2 seconds  Findings: No rash  Neurological:      Mental Status: She is alert and oriented to person, place, and time  Cranial Nerves: No cranial nerve deficit  Sensory: No sensory deficit  Motor: No abnormal muscle tone  Gait: Gait abnormal    Psychiatric:         Mood and Affect: Mood is depressed  Affect is tearful  Behavior: Behavior normal          Thought Content:  Thought content normal          Judgment: Judgment normal       Comments: Depressed and on the verge of tears              Alex Kevin MD  [place] [id number]

## 2022-02-02 ENCOUNTER — HOSPITAL ENCOUNTER (OUTPATIENT)
Dept: RADIOLOGY | Facility: CLINIC | Age: 57
Discharge: HOME | End: 2022-02-02
Attending: PHYSICAL MEDICINE & REHABILITATION | Admitting: PHYSICAL MEDICINE & REHABILITATION
Payer: COMMERCIAL

## 2022-02-02 ENCOUNTER — TELEPHONE (OUTPATIENT)
Dept: PSYCHIATRY | Facility: CLINIC | Age: 57
End: 2022-02-02

## 2022-02-02 VITALS
RESPIRATION RATE: 18 BRPM | HEART RATE: 78 BPM | DIASTOLIC BLOOD PRESSURE: 92 MMHG | TEMPERATURE: 97.7 F | OXYGEN SATURATION: 95 % | SYSTOLIC BLOOD PRESSURE: 149 MMHG

## 2022-02-02 DIAGNOSIS — G89.4 CHRONIC PAIN SYNDROME: ICD-10-CM

## 2022-02-02 DIAGNOSIS — G89.29 CHRONIC LEFT-SIDED LOW BACK PAIN WITH LEFT-SIDED SCIATICA: ICD-10-CM

## 2022-02-02 DIAGNOSIS — M48.061 LUMBAR FORAMINAL STENOSIS: ICD-10-CM

## 2022-02-02 DIAGNOSIS — M54.42 CHRONIC LEFT-SIDED LOW BACK PAIN WITH LEFT-SIDED SCIATICA: ICD-10-CM

## 2022-02-02 DIAGNOSIS — M48.062 SPINAL STENOSIS OF LUMBAR REGION WITH NEUROGENIC CLAUDICATION: ICD-10-CM

## 2022-02-02 PROCEDURE — 64483 NJX AA&/STRD TFRM EPI L/S 1: CPT | Performed by: PHYSICAL MEDICINE & REHABILITATION

## 2022-02-02 RX ORDER — 0.9 % SODIUM CHLORIDE 0.9 %
10 VIAL (ML) INJECTION ONCE
Status: COMPLETED | OUTPATIENT
Start: 2022-02-02 | End: 2022-02-02

## 2022-02-02 RX ORDER — PAPAVERINE HCL 150 MG
20 CAPSULE, EXTENDED RELEASE ORAL ONCE
Status: COMPLETED | OUTPATIENT
Start: 2022-02-02 | End: 2022-02-02

## 2022-02-02 RX ORDER — BUPIVACAINE HCL/PF 2.5 MG/ML
10 VIAL (ML) INJECTION ONCE
Status: COMPLETED | OUTPATIENT
Start: 2022-02-02 | End: 2022-02-02

## 2022-02-02 RX ADMIN — Medication 2 ML: at 08:52

## 2022-02-02 RX ADMIN — BUPIVACAINE HYDROCHLORIDE 1 ML: 2.5 INJECTION, SOLUTION EPIDURAL; INFILTRATION; INTRACAUDAL at 08:58

## 2022-02-02 RX ADMIN — DEXAMETHASONE SODIUM PHOSPHATE 20 MG: 10 INJECTION, SOLUTION INTRAMUSCULAR; INTRAVENOUS at 08:58

## 2022-02-02 RX ADMIN — SODIUM CHLORIDE 2 ML: 9 INJECTION, SOLUTION INTRAMUSCULAR; INTRAVENOUS; SUBCUTANEOUS at 08:52

## 2022-02-02 RX ADMIN — IOHEXOL 2 ML: 300 INJECTION, SOLUTION INTRAVENOUS at 08:58

## 2022-02-02 NOTE — TELEPHONE ENCOUNTER
Medical record request was received from William Blancas and Sarah Francis for All therapy records  Placed in [address] [place] [address] to be reviewed and signed

## 2022-02-02 NOTE — DISCHARGE INSTR - LAB
Epidural Steroid Injection   WHAT YOU NEED TO KNOW:   An epidural steroid injection (AVIVA) is a procedure to inject steroid medicine into the epidural space  The epidural space is between your spinal cord and vertebrae  Steroids reduce inflammation and fluid buildup in your spine that may be causing pain  You may be given pain medicine along with the steroids  ACTIVITY  Do not drive or operate machinery today  No strenuous activity today - bending, lifting, etc   You may resume normal activites starting tomorrow - start slowly and as tolerated  You may shower today, but no tub baths or hot tubs  You may have numbness for several hours from the local anesthetic  Please use caution and common sense, especially with weight-bearing activities  CARE OF THE INJECTION SITE  If you have soreness or pain, apply ice to the area today (20 minutes on/20 minutes off)  Starting tomorrow, you may use warm, moist heat or ice if needed  You may have an increase or change in your discomfort for 36-48 hours after your treatment  Apply ice and continue with any pain medication you have been prescribed  Notify the [facility] if you have any of the following: redness, drainage, swelling, headache, stiff neck or fever above 100°F     SPECIAL INSTRUCTIONS  Our office will contact you in approximately 7 days for a progress report  MEDICATIONS  Continue to take all routine medications  Our office may have instructed you to hold some medications  As no general anesthesia was used in today's procedure, you should not experience any side effects related to anesthesia  If you have a problem specifically related to your procedure, please call our office at [phone]  Problems not related to your procedure should be directed to your primary care physician

## 2022-02-02 NOTE — H&P
History of Present Illness: The patient is a 64 y o  female who presents with complaints of low back pain    Patient Active Problem List   Diagnosis    Colon polyp    Internal hemorrhoids    Family history of colorectal cancer    Vitamin D deficiency    Iron deficiency    H/O gastric bypass    B12 deficiency    Moderate episode of recurrent major depressive disorder (HCC)    Fibromyalgia    Chronic left-sided low back pain with left-sided sciatica    Bilateral hip pain    Obesity, Class II, BMI 35-39 9    Encounter for surgical aftercare following surgery of digestive system    Postsurgical malabsorption    Constipation    Bariatric surgery status    Sacroiliitis (HCC)    Piriformis syndrome of left side    Piriformis syndrome of right side    Trochanteric bursitis of both hips    Lumbar facet arthropathy    Osteopenia    Anxiety       Past Medical History:   Diagnosis Date    Anxiety     Fibromyalgia     Medical history unknown     Osteopenia        Past Surgical History:   Procedure Laterality Date    CARPAL TUNNEL RELEASE Bilateral     21 y o  with left ulnar nerve release        CARPAL TUNNEL RELEASE Bilateral      SECTION      ELBOW SURGERY      Tennis elbow    GASTRIC BYPASS      REDUCTION MAMMAPLASTY Bilateral     WISDOM TOOTH EXTRACTION           Current Outpatient Medications:     ascorbic acid (VITAMIN C) 500 MG tablet, Take 500 mg by mouth daily, Disp: , Rfl:     baclofen 10 mg tablet, Take 1 tablet (10 mg total) by mouth 2 (two) times a day as needed for muscle spasms, Disp: 40 tablet, Rfl: 0    Cholecalciferol 25 MCG (1000 UT) tablet, Take 1,000 Units by mouth daily, Disp: , Rfl:     [facility] B-12 500 MCG tablet, TAKE 2 TABLETS (1,000 MCG TOTAL) BY MOUTH DAILY, Disp: 180 tablet, Rfl: 1    diazepam (VALIUM) 5 mg tablet, TAKE 1 TABLET (5 MG TOTAL) BY MOUTH EVERY 12 (TWELVE) HOURS AS NEEDED FOR ANXIETY, SLEEP OR MUSCLE SPASMS, Disp: 30 tablet, Rfl: 0    doxepin (SINEquan) 150 MG capsule, Take 1 capsule (150 mg total) by mouth daily at bedtime, Disp: 90 capsule, Rfl: 1    ergocalciferol (VITAMIN D2) 50,000 units, Take 1 capsule (50,000 Units total) by mouth every 28 days, Disp: 4 capsule, Rfl: 2    gabapentin (Neurontin) 600 MG tablet, Take 1 tablet (600 mg total) by mouth 3 (three) times a day, Disp: 90 tablet, Rfl: 2    valsartan (DIOVAN) 160 mg tablet, Take 1 tablet (160 mg total) by mouth daily, Disp: 90 tablet, Rfl: 1    Current Facility-Administered Medications:     bupivacaine (PF) (MARCAINE) 0 25 % injection 10 mL, 10 mL, Epidural, Once, Perfecto Alissa, DO    dexamethasone (PF) (DECADRON) injection 20 mg, 20 mg, Epidural, Once, Perfecto Alissa, DO    iohexol (OMNIPAQUE) 300 mg/mL injection 50 mL, 50 mL, Epidural, Once, Perfecto Ontario, DO    lidocaine (PF) (XYLOCAINE-MPF) 2 % injection 5 mL, 5 mL, Infiltration, Once, Perfecto Alissa, DO    sodium chloride (PF) 0 9 % injection 10 mL, 10 mL, Infiltration, Once, Perfecto Ontario, DO    No Known Allergies    Physical Exam: There were no vitals filed for this visit  General: Awake, Alert, Oriented x 3, Mood and affect appropriate  Respiratory: Respirations even and unlabored  Cardiovascular: Peripheral pulses intact; no edema  Musculoskeletal Exam:  Tenderness to palpation bilateral lumbar paraspinals    ASA Score: 2    Patient/Chart Verification  Patient ID Verified: Verbal  Consents Confirmed: To be obtained in the Pre-Procedure area  Interval H&P(within 24 hr) Complete (required for Outpatients and Surgery Admit only): To be obtained in the Pre-Procedure area  Allergies Reviewed: Yes  Anticoag/NSAID held?: NA  Currently on antibiotics?: No    Assessment:   1  Chronic left-sided low back pain with left-sided sciatica    2  Lumbar foraminal stenosis    3  Spinal stenosis of lumbar region with neurogenic claudication    4   Chronic pain syndrome        Plan: Bilateral L5-S1 TFESI

## 2022-02-03 ENCOUNTER — TELEPHONE (OUTPATIENT)
Dept: PAIN MEDICINE | Facility: MEDICAL CENTER | Age: 57
End: 2022-02-03

## 2022-02-03 DIAGNOSIS — M79.7 FIBROMYALGIA: ICD-10-CM

## 2022-02-03 RX ORDER — DIAZEPAM 5 MG/1
5 TABLET ORAL EVERY 12 HOURS PRN
Qty: 30 TABLET | Refills: 0 | Status: SHIPPED | OUTPATIENT
Start: 2022-02-03 | End: 2022-02-11

## 2022-02-03 NOTE — TELEPHONE ENCOUNTER
Recieved a call from the  from Klyer Macias at [facility] in [place] regarding a refill request for Gabapentin for pt  Per Pharmacist, Gabapentin is controlled in [place] so script needs to be faxed or called in ( RN can call if authorized by Physician per Kyler Macias) The dose they have for pt is Gabapentin 600 mg tid  Pharmacist was advised that the patient needs to call for refill requests and that we need to follow up with the patient to confirm the dose and will call back after confirming with pt and discussing with Dr Junior Ontiveros      [facility]- [phone] Option #3    Pharmacy needs to be added to pt's preferred pharmacy list

## 2022-02-03 NOTE — TELEPHONE ENCOUNTER
Received a call from [facility] for patients mediation  Patient will be now getting pill packs from this pharmacy  Also see they contacted pain management     Did you to to send to this pharmacy or have patient continue to use local?     SUSANNE WOO Last refill 01/12/2022 # 30

## 2022-02-03 NOTE — TELEPHONE ENCOUNTER
RN s/w pt regarding previous  Per pt she is starting to have her pills filled through [address], through a [facility] in [place]  Pt does take gabapentin 600mg TID  RN s/w Melissa Washington the pharamcist from [address] [facility] in [place]  Per Melissa Washington gabapentin is a controlled substance in [place], but not yet in [place]  Per Melissa Washington they can take a telephone order for the gabapentin or a fax with an original signature and FRANK  --please advise thank you--  Can RN give telephone order for gabapentin 600mg TID with refills? Per Melissa Washington they are able to take 5 refills at one time to keep on file

## 2022-02-07 ENCOUNTER — OFFICE VISIT (OUTPATIENT)
Dept: HEMATOLOGY ONCOLOGY | Facility: CLINIC | Age: 57
End: 2022-02-07
Payer: COMMERCIAL

## 2022-02-07 VITALS
WEIGHT: 192 LBS | OXYGEN SATURATION: 98 % | DIASTOLIC BLOOD PRESSURE: 90 MMHG | BODY MASS INDEX: 36.25 KG/M2 | HEART RATE: 76 BPM | HEIGHT: 61 IN | SYSTOLIC BLOOD PRESSURE: 150 MMHG | TEMPERATURE: 97.7 F | RESPIRATION RATE: 17 BRPM

## 2022-02-07 DIAGNOSIS — E61.1 IRON DEFICIENCY: ICD-10-CM

## 2022-02-07 DIAGNOSIS — Z98.84 BARIATRIC SURGERY STATUS: ICD-10-CM

## 2022-02-07 DIAGNOSIS — E53.8 B12 DEFICIENCY: Primary | ICD-10-CM

## 2022-02-07 PROCEDURE — 99215 OFFICE O/P EST HI 40 MIN: CPT | Performed by: PHYSICIAN ASSISTANT

## 2022-02-07 RX ORDER — CYANOCOBALAMIN 1000 UG/ML
1000 INJECTION INTRAMUSCULAR; SUBCUTANEOUS ONCE
Status: CANCELLED | OUTPATIENT
Start: 2022-02-16

## 2022-02-07 NOTE — PROGRESS NOTES
Hematology/Oncology Outpatient Follow- up Note  Tammi Vyas 64 y o  female MRN: @ Encounter: [id number]        Date:  2/7/2022      Assessment / Plan:    Iron deficiency anemia and B12 deficiency secondary to decreased absorption   She is status post gastric bypass surgery  She has received IV iron in the past with good tolerance        Venofer 300 milligrams x 6 doses along with B12 1000 micro grams IM x6 doses administered 3/2021  She remains iron replete  B12 decreased to 400 range  She is taking sublingual B12  Will arrange for B12 1000 micro grams IM weekly x4      Follow-up in 12 months with repeat labs            HPI:  Hiral Jaeger is a 64 y o  seen for initial consultation 2/22/2021 at the referral of Paulina Erickson MD regarding iron deficiency        1/18/2021 hemoglobin 12 7, MCV of 96, white blood cell count 5 48,  62% neutrophils, 25% lymphocytes, 9% monocytes, platelets 513   CMP normal      Vitamin B12 316, ferritin of 10    Normal hepatitis C antibody and HIV antigens/ antibody     5/13/20:   Hemoglobin 13 6, MCV 91 6, white blood cell count 5 2, normal differential, platelets 635     She is status post gastric bypass surgery approximately 2006       She underwent upper endoscopy and colonoscopy 12/31/2019 by Dr Silvestre Serrano anastomosis was noted to look good   Internal hemorrhoids noted in 1 small polyp was removed   Repeat colonoscopy in 5 years was recommended,     She has received IV iron in the past at [address] good tolerance     Cannot recall when her last treatment was but it was many years ago      States she does not like going to doctors     She does have chronic back pain      Spends her free time wood working and being creative     She has been postmenopausal for many years  [facility] had always been erratic    She has fatigue   Denies any headaches, dizziness, chest pain or shortness of breath   No palpitations   No GI or  issues  Alyssa Bermudez denies any melena, hematochezia or other sources of blood loss      She does take B12 orally faithfully     Venofer 300 milligrams x 6 doses along with B12 1000 micro grams IM x6 doses administered 3/2021        Ferritin improved from 10 1/2021 to 245 5/2021        Iron panel from 8/23/2021 identified saturation 25%, ferritin 170  Interval History:    1/31/22:  Hemoglobin 13 3, white blood cell count 5 92, platelets 298  Iron saturation 35%, ferritin 162  B12 475    Test Results:        Labs:   Lab Results   Component Value Date    HGB 13 3 01/31/2022    HCT 40 0 01/31/2022    MCV 96 01/31/2022     01/31/2022    WBC 5 92 01/31/2022    NRBC 0 01/31/2022     Lab Results   Component Value Date    K 4 1 08/23/2021     08/23/2021    CO2 30 08/23/2021    BUN 14 08/23/2021    CREATININE 0 70 08/23/2021    GLUF 72 08/23/2021    CALCIUM 8 6 08/23/2021    AST 15 08/23/2021    ALT 18 08/23/2021    ALKPHOS 56 3 08/23/2021    EGFR 97 08/23/2021       Imaging: FL spine and pain procedure    Result Date: 2/2/2022  Narrative: Indication:  Leg pain Preoperative diagnosis:  Lumbar radiculitis Postoperative diagnosis:  Lumbar radiculitis Procedure: Fluoroscopically-guided bilateral L5-S1 transforaminal epidural steroid injection under fluoroscopy EBL:  none Specimens:  not applicable After discussing the risks, benefits, and alternatives to the procedure, the patient expressed understanding and wished to proceed  The patient was brought to the fluoroscopy suite and placed in the prone position  A procedural pause was conducted to verify:  correct patient identity, procedure to be performed and as applicable, correct side and site, correct patient position, and availability of implants, special equipment and special requirements  After identifying the right  L5 pedicle fluoroscopically with an oblique view, the skin was sterilely prepped and draped in the usual fashion using Chloraprep skin prep    The skin and subcutaneous tissues were anesthetized with 1% lidocaine  A  5 in 22 gauge spinal needle was then advanced under fluoroscopic guidance to the neural foramen  Appropriate foraminal depth was determined with a lateral fluoroscopic view, and AP visualization confirmed needle positioning at approximately the 6 o'clock position relative to the pedicle  After negative aspiration, Omnipaque 300 contrast was injected using live fluoroscopy confirming appropriate transforaminal spread without evidence of intravascular or intrathecal uptake  Next, a 1 5 ml solution consisting of 10 mg of dexamethasone and 0 25% bupivacaine was injected slowly and incrementally into the epidural space  Following the injection the needle was withdrawn slightly and flushed with lidocaine as it was fully extracted  The procedure was then repeated in the exact same way on the opposite side at the same level  The patient tolerated the procedure well and there were no apparent complications  The patient did not develop any new neurologic deficits  After appropriate observation, the patient was dismissed from the clinic in good condition under their own power  ROS:  As mentioned in HPI & Interval History otherwise 14 point ROS negative  Allergies: No Known Allergies  Current Medications: Reviewed  PMH/FH/SH:  Reviewed      Physical Exam:    There is no height or weight on file to calculate BSA      Ht Readings from Last 3 Encounters:   01/31/22 5' 1" (1 549 m)   11/22/21 5' 1" (1 549 m)   09/17/21 5' 1" (1 549 m)        Wt Readings from Last 3 Encounters:   01/31/22 87 5 kg (193 lb)   01/19/22 88 9 kg (196 lb)   11/30/21 85 3 kg (188 lb)        Temp Readings from Last 3 Encounters:   02/02/22 97 7 °F (36 5 °C) (Temporal)   11/15/21 (!) 97 2 °F (36 2 °C) (Temporal)   08/23/21 98 2 °F (36 8 °C) (Tympanic)        BP Readings from Last 3 Encounters:   02/02/22 149/92   01/31/22 126/84   01/19/22 145/81           Physical Exam  Constitutional:       Appearance: She is well-developed  HENT:      Head: Normocephalic and atraumatic  Cardiovascular:      Rate and Rhythm: Normal rate and regular rhythm  Heart sounds: Normal heart sounds  Pulmonary:      Effort: Pulmonary effort is normal  No respiratory distress  Breath sounds: Normal breath sounds  No wheezing  Musculoskeletal:      Cervical back: Neck supple  Skin:     General: Skin is warm and dry  Neurological:      Mental Status: She is alert  Psychiatric:         Behavior: Behavior normal            Emergency Contacts:    No emergency contact information on file

## 2022-02-08 DIAGNOSIS — M79.7 FIBROMYALGIA: ICD-10-CM

## 2022-02-08 DIAGNOSIS — E53.8 B12 DEFICIENCY: ICD-10-CM

## 2022-02-09 ENCOUNTER — TELEPHONE (OUTPATIENT)
Dept: PAIN MEDICINE | Facility: CLINIC | Age: 57
End: 2022-02-09

## 2022-02-10 ENCOUNTER — TELEMEDICINE (OUTPATIENT)
Dept: BEHAVIORAL/MENTAL HEALTH CLINIC | Facility: CLINIC | Age: 57
End: 2022-02-10

## 2022-02-10 DIAGNOSIS — F33.1 MODERATE RECURRENT MAJOR DEPRESSION (HCC): Primary | ICD-10-CM

## 2022-02-10 NOTE — PSYCH
No Call  No Show  No Charge    Juan Simpler no showed 02/10/22 appointment , staff called and left message to reschedule appointment     Treatment Plan not due at this session

## 2022-02-11 RX ORDER — DIAZEPAM 5 MG/1
TABLET ORAL
Qty: 60 TABLET | Refills: 0 | Status: SHIPPED | OUTPATIENT
Start: 2022-02-11 | End: 2022-03-10

## 2022-02-11 RX ORDER — CHOLECALCIFEROL (VITAMIN D3) 125 MCG
CAPSULE ORAL
Qty: 60 TABLET | Refills: 2 | Status: SHIPPED | OUTPATIENT
Start: 2022-02-11 | End: 2022-06-28 | Stop reason: SDUPTHER

## 2022-02-16 ENCOUNTER — TELEPHONE (OUTPATIENT)
Dept: PSYCHIATRY | Facility: CLINIC | Age: 57
End: 2022-02-16

## 2022-02-16 ENCOUNTER — HOSPITAL ENCOUNTER (OUTPATIENT)
Dept: INFUSION CENTER | Facility: CLINIC | Age: 57
Discharge: HOME | End: 2022-02-16
Payer: COMMERCIAL

## 2022-02-16 VITALS — DIASTOLIC BLOOD PRESSURE: 62 MMHG | SYSTOLIC BLOOD PRESSURE: 122 MMHG

## 2022-02-16 DIAGNOSIS — E53.8 B12 DEFICIENCY: Primary | ICD-10-CM

## 2022-02-16 PROCEDURE — 96372 THER/PROPH/DIAG INJ SC/IM: CPT

## 2022-02-16 RX ORDER — CYANOCOBALAMIN 1000 UG/ML
1000 INJECTION INTRAMUSCULAR; SUBCUTANEOUS ONCE
Status: CANCELLED | OUTPATIENT
Start: 2022-02-23

## 2022-02-16 RX ORDER — CYANOCOBALAMIN 1000 UG/ML
1000 INJECTION INTRAMUSCULAR; SUBCUTANEOUS ONCE
Status: COMPLETED | OUTPATIENT
Start: 2022-02-16 | End: 2022-02-16

## 2022-02-16 RX ADMIN — CYANOCOBALAMIN 1000 MCG: 1000 INJECTION, SOLUTION INTRAMUSCULAR; SUBCUTANEOUS at 13:31

## 2022-02-16 NOTE — PROGRESS NOTES
Patient here for B12 injection  Patient expressed concern to RN that she has recently started a new BP med and has been feeling very tired from it and occasionally dizzy  Patient stated she didn't think she should drive so a friend is providing transportation to today's appointment  /62  Patient stated she would just stop this med, informed patient she should call her doctor who handles her BP meds and talk to them about her symptoms to see other options such as a different dose or possibly a different medication  Patient stated she would contact her doctor  Patient tolerated injection in [facility] without issue  AVS provided  Patient stable and ambulatory on d/c with ride waiting in [facility]

## 2022-02-23 ENCOUNTER — HOSPITAL ENCOUNTER (OUTPATIENT)
Dept: INFUSION CENTER | Facility: CLINIC | Age: 57
Discharge: HOME | End: 2022-02-23
Payer: COMMERCIAL

## 2022-02-23 DIAGNOSIS — E53.8 B12 DEFICIENCY: Primary | ICD-10-CM

## 2022-02-23 PROCEDURE — 96372 THER/PROPH/DIAG INJ SC/IM: CPT

## 2022-02-23 RX ORDER — CYANOCOBALAMIN 1000 UG/ML
1000 INJECTION INTRAMUSCULAR; SUBCUTANEOUS ONCE
Status: CANCELLED | OUTPATIENT
Start: 2022-03-02

## 2022-02-23 RX ORDER — CYANOCOBALAMIN 1000 UG/ML
1000 INJECTION INTRAMUSCULAR; SUBCUTANEOUS ONCE
Status: COMPLETED | OUTPATIENT
Start: 2022-02-23 | End: 2022-02-23

## 2022-02-23 RX ADMIN — CYANOCOBALAMIN 1000 MCG: 1000 INJECTION, SOLUTION INTRAMUSCULAR; SUBCUTANEOUS at 13:40

## 2022-02-23 NOTE — PROGRESS NOTES
Patient here for b12 injection and is well, still having fatigue but labs look within range  She tolerated injection to left deltoid, bandaid applied and CDI  Patient will RTO in 1 week for dose 3 of 4

## 2022-02-23 NOTE — PATIENT INSTRUCTIONS
1     2    FL SPINE AND PAIN PROCEDURE   8:20 AM   (20 min )   AN SP 1   [facility] [place] [address]     4     5                6     7    FOLLOW UP PG   8:15 AM   (30 min )   Petra Apgar, PA-C   [facility] [place] 8     9     10     11     12                13     14     15     16    INF THERAPY PLAN   1:30 PM   (30 min )   AN INF QUICK CHAIR [address] 17     18     19          Cycle 1, Treatment 1      20     21     22     23    INF THERAPY PLAN   1:30 PM   (30 min )   AN INF QUICK CHAIR [address] 24    OFFICE VISIT LONG PG  12:45 PM   (60 min )   Melissa Cunningham [facility] Therapist [place] 25     26          Cycle 1, Treatment 2      27     28                                              Treatment Details       2022 - Cycle 1, Treatment 1      Supportive Care: cyanocobalamin    2022 - Cycle 1, Treatment 2      Supportive Care: cyanocobalamin                     1     2    OFFICE VISIT SHORT PG   8:15 AM   (15 min )   CHAYITO LOCKETT   [facility] [place]    INF THERAPY PLAN   1:00 PM   (30 min )   AN INF QUICK CHAIR 01   [address] [place] [address] 3     4     5                6     7     8     9    INF THERAPY PLAN  12:30 PM   (30 min )   AN INF QUICK CHAIR [address] 10     11     12                13     14     15     16     17     18     19                20     21     22     23    DXA BONE DENSE SPINE HIP/PELV   1:30 PM   (30 min )   AN MAMMO [address] [facility] 24     22  Happy Birthday!     11                70     57     82     19     67

## 2022-02-24 ENCOUNTER — SOCIAL WORK (OUTPATIENT)
Dept: BEHAVIORAL/MENTAL HEALTH CLINIC | Facility: CLINIC | Age: 57
End: 2022-02-24
Payer: COMMERCIAL

## 2022-02-24 DIAGNOSIS — F41.9 ANXIETY: Primary | ICD-10-CM

## 2022-02-24 DIAGNOSIS — F33.1 MODERATE EPISODE OF RECURRENT MAJOR DEPRESSIVE DISORDER (HCC): ICD-10-CM

## 2022-02-24 PROCEDURE — 90834 PSYTX W PT 45 MINUTES: CPT | Performed by: COUNSELOR

## 2022-02-24 NOTE — PSYCH
Psychotherapy Provided: Individual Psychotherapy 50 minutes     Length of time in session: 50 minutes, follow up in 2 weeks    Encounter Diagnosis     ICD-10-CM    1  Anxiety  F41 9    2  Moderate episode of recurrent major depressive disorder (HCC)  F33 1        Goals addressed in session: Goal 1     Pain:      moderate to severe    8    Current suicide risk : Moderate    Data: Zechariah Mackey admits passive death wishes due to severe back pain constantly, preventing her from normal daily functioning  At nights, she sleeps only about 3 hours or so and feels exhausted during the day  She was reminded about EFT tapping for nerve pain and recommended to discuss with her spine specialist another treatment, if so far  These are ineffective  She reported inability to do almost anything due to severe constant pain that needs to be resolved and has been disturbing her life for some time  Validation, Person-Centered, DBT-based, and CBT techniques were applied  Assessment: Zechariah Mackey felt sad and anxious at first, later, during the talk, she was calmer and more talkative  She was also tearful and emotional  During the second part of the session, Zechariah Mackey improved her mood and felt hopeful  Plan: Zechariah Mackey agreed to continue use EFT when her pain is manageable as well as to add positive self-affirmations to counter the negative thoughts  Consultation with her Spine Specialist is scheduled, and a discussion about RFA is planned, too  Pain resolution is number 1 in her plans due to severity and restriction to her daily functioning  Behavioral Health Treatment Plan [facility]: Diagnosis and Treatment Plan explained to Jacob Cage relates understanding diagnosis and is agreeable to Treatment Plan   Yes

## 2022-03-02 ENCOUNTER — TELEPHONE (OUTPATIENT)
Dept: INFUSION CENTER | Facility: CLINIC | Age: 57
End: 2022-03-02

## 2022-03-02 ENCOUNTER — OFFICE VISIT (OUTPATIENT)
Dept: PAIN MEDICINE | Facility: CLINIC | Age: 57
End: 2022-03-02
Payer: COMMERCIAL

## 2022-03-02 VITALS
DIASTOLIC BLOOD PRESSURE: 89 MMHG | BODY MASS INDEX: 36.25 KG/M2 | HEIGHT: 61 IN | HEART RATE: 83 BPM | SYSTOLIC BLOOD PRESSURE: 138 MMHG | WEIGHT: 192 LBS

## 2022-03-02 DIAGNOSIS — M25.551 BILATERAL HIP PAIN: ICD-10-CM

## 2022-03-02 DIAGNOSIS — M62.838 SPASM OF RIGHT PIRIFORMIS MUSCLE: ICD-10-CM

## 2022-03-02 DIAGNOSIS — G57.01 PIRIFORMIS SYNDROME OF RIGHT SIDE: ICD-10-CM

## 2022-03-02 DIAGNOSIS — M25.551 PAIN IN RIGHT HIP: ICD-10-CM

## 2022-03-02 DIAGNOSIS — G57.02 PIRIFORMIS SYNDROME OF LEFT SIDE: ICD-10-CM

## 2022-03-02 DIAGNOSIS — M46.1 SACROILIITIS (HCC): ICD-10-CM

## 2022-03-02 DIAGNOSIS — G89.29 CHRONIC LEFT-SIDED LOW BACK PAIN WITH LEFT-SIDED SCIATICA: ICD-10-CM

## 2022-03-02 DIAGNOSIS — M70.61 GREATER TROCHANTERIC BURSITIS OF BOTH HIPS: ICD-10-CM

## 2022-03-02 DIAGNOSIS — M25.551 CHRONIC RIGHT HIP PAIN: ICD-10-CM

## 2022-03-02 DIAGNOSIS — M48.061 SPINAL STENOSIS OF LUMBAR REGION WITHOUT NEUROGENIC CLAUDICATION: ICD-10-CM

## 2022-03-02 DIAGNOSIS — M47.816 LUMBAR FACET ARTHROPATHY: ICD-10-CM

## 2022-03-02 DIAGNOSIS — M70.62 GREATER TROCHANTERIC BURSITIS OF BOTH HIPS: ICD-10-CM

## 2022-03-02 DIAGNOSIS — G89.29 CHRONIC RIGHT HIP PAIN: ICD-10-CM

## 2022-03-02 DIAGNOSIS — M54.42 CHRONIC LEFT-SIDED LOW BACK PAIN WITH LEFT-SIDED SCIATICA: ICD-10-CM

## 2022-03-02 DIAGNOSIS — M25.552 BILATERAL HIP PAIN: ICD-10-CM

## 2022-03-02 DIAGNOSIS — G89.4 CHRONIC PAIN SYNDROME: Primary | ICD-10-CM

## 2022-03-02 PROCEDURE — 99214 OFFICE O/P EST MOD 30 MIN: CPT | Performed by: NURSE PRACTITIONER

## 2022-03-02 RX ORDER — GABAPENTIN 600 MG/1
600 TABLET ORAL 3 TIMES DAILY
Qty: 90 TABLET | Refills: 2 | Status: SHIPPED | OUTPATIENT
Start: 2022-03-02 | End: 2022-04-25 | Stop reason: SDUPTHER

## 2022-03-02 RX ORDER — GABAPENTIN 300 MG/1
300 CAPSULE ORAL 3 TIMES DAILY
Qty: 90 CAPSULE | Refills: 1 | Status: SHIPPED | OUTPATIENT
Start: 2022-03-02 | End: 2022-04-25 | Stop reason: SDUPTHER

## 2022-03-02 RX ORDER — BACLOFEN 20 MG/1
20 TABLET ORAL 2 TIMES DAILY PRN
Qty: 60 TABLET | Refills: 0 | Status: SHIPPED | OUTPATIENT
Start: 2022-03-02 | End: 2022-04-25 | Stop reason: SDUPTHER

## 2022-03-02 NOTE — PROGRESS NOTES
Assessment:  1  Chronic pain syndrome    2  Spinal stenosis of lumbar region without neurogenic claudication    3  Chronic right hip pain    4  Spasm of right piriformis muscle    5  Chronic left-sided low back pain with left-sided sciatica    6  Pain in right hip    7  Piriformis syndrome of right side    8  Lumbar facet arthropathy    9  Bilateral hip pain    10  Greater trochanteric bursitis of both hips    11  Piriformis syndrome of left side    12  Sacroiliitis ([address] )        Plan:  The patient was seen in the office for follow-up after her bilateral L5-S1 transforaminal epidural steroid injection on 2022  Patient states that the injection eliminated most of her left-sided radicular leg pain  However, she is left with severe right-sided hip and buttock pain that radiates down the back of her right leg  At this time, we will do the followin  Schedule right-sided piriformis muscle injection with fluoroscopy guidance  2  Obtain x-ray of right hip     3  Increase baclofen from 10 mg twice a day to 20 mg twice a day and gabapentin from 600 mg twice a day to 900 mg twice a day  Titrating dose for the gabapentin was provided to the patient she verbalized understanding and states she has no side effects from either of these medications  Prescriptions were sent to the pharmacy on file  Complete risks and benefits including bleeding, infection, tissue reaction, nerve injury and allergic reaction were discussed  The approach was demonstrated using models and literature was provided  Verbal and written consent was obtained  My impressions and treatment recommendations were discussed in detail with the patient who verbalized understanding and had no further questions  Discharge instructions were provided  I personally saw and examined the patient and I agree with the above discussed plan of care      Orders Placed This Encounter   Procedures    XR hip/pelv 2-3 vws right if performed     Standing Status:   Future     Standing Expiration Date:   3/2/2026     Scheduling Instructions:      Bring along any outside films relating to this procedure  Order Specific Question:   Is the patient pregnant? Answer:   No    [place] spine and pain procedure     Dr Lana Roberts     Standing Status:   Future     Standing Expiration Date:   3/2/2026     Order Specific Question:   Reason for Exam:     Answer:   right piriformis muscle injection with fluoroscopy     Order Specific Question:   Is the patient pregnant? Answer:   No     Order Specific Question:   Anticoagulant hold needed? Answer:   No     New Medications Ordered This Visit   Medications    baclofen 20 mg tablet     Sig: Take 1 tablet (20 mg total) by mouth 2 (two) times a day as needed for muscle spasms     Dispense:  60 tablet     Refill:  0    gabapentin (Neurontin) 600 MG tablet     Sig: Take 1 tablet (600 mg total) by mouth 3 (three) times a day     Dispense:  90 tablet     Refill:  2    gabapentin (NEURONTIN) 300 mg capsule     Sig: Take 1 capsule (300 mg total) by mouth 3 (three) times a day In addition to 600 mg TID for a total of 900 mg TID     Dispense:  90 capsule     Refill:  1       History of Present Illness:  Rene Cuellar is a 64 y o  female who presents for a follow up office visit in regards to Back Pain and Groin Pain  The patients current symptoms include a pain score of 8/10 that is constant  She states at the quality of her pain is burning, dull aching and at times sharp and stabbing  She states that the pain is in her right groin right upper buttock and hip and radiates down the back of her right leg  Patient reports extreme pain in her right upper buttock and hip with bowel movements  I have personally reviewed and/or updated the patient's past medical history, past surgical history, family history, social history, current medications, allergies, and vital signs today       Review of Systems   Respiratory: Positive for shortness of breath  Gastrointestinal: Positive for constipation  Musculoskeletal: Positive for gait problem  Decreased range of motion  Muscle weakness  Joint stiffness  Pain in extremity   Neurological:        Memory loss       Patient Active Problem List   Diagnosis    Colon polyp    Internal hemorrhoids    Family history of colorectal cancer    Vitamin D deficiency    Iron deficiency    H/O gastric bypass    B12 deficiency    Moderate episode of recurrent major depressive disorder (HCC)    Fibromyalgia    Chronic left-sided low back pain with left-sided sciatica    Bilateral hip pain    Obesity, Class II, BMI 35-39 9    Encounter for surgical aftercare following surgery of digestive system    Postsurgical malabsorption    Constipation    Bariatric surgery status    Sacroiliitis (HCC)    Piriformis syndrome of left side    Piriformis syndrome of right side    Trochanteric bursitis of both hips    Lumbar facet arthropathy    Osteopenia    Anxiety    Lumbar foraminal stenosis    Spinal stenosis of lumbar region with neurogenic claudication    Chronic pain syndrome       Past Medical History:   Diagnosis Date    Anxiety     Fibromyalgia     Medical history unknown     Osteopenia        Past Surgical History:   Procedure Laterality Date    CARPAL TUNNEL RELEASE Bilateral     21 y o  with left ulnar nerve release        CARPAL TUNNEL RELEASE Bilateral      SECTION      ELBOW SURGERY      Tennis elbow    GASTRIC BYPASS      REDUCTION MAMMAPLASTY Bilateral     WISDOM TOOTH EXTRACTION         Family History   Problem Relation Age of Onset    Ovarian cancer Mother     Heart disease Mother     Colon cancer Father     No Known Problems Maternal Grandmother     No Known Problems Maternal Grandfather     Diabetes Paternal Grandmother     No Known Problems Paternal Grandfather     No Known Problems Son     Leukemia Maternal Uncle     Diabetes Paternal Aunt  Diabetes Paternal Uncle        Social History     Occupational History    Not on file   Tobacco Use    Smoking status: Never Smoker    Smokeless tobacco: Never Used   Vaping Use    Vaping Use: Never used   Substance and Sexual Activity    Alcohol use: No    Drug use: Never    Sexual activity: Not on file       Current Outpatient Medications on File Prior to Visit   Medication Sig    ascorbic acid (VITAMIN C) 500 MG tablet Take 500 mg by mouth daily    Cholecalciferol 25 MCG (1000 UT) tablet Take 1,000 Units by mouth daily    diazepam (VALIUM) 5 mg tablet TAKE 1 TABLET BY MOUTH EVERY 12 HOURS AS NEEDED FOR ANXIETY, SLEEP, OR MUSCLE SPASMS    doxepin (SINEquan) 150 MG capsule Take 1 capsule (150 mg total) by mouth daily at bedtime    ergocalciferol (VITAMIN D2) 50,000 units Take 1 capsule (50,000 Units total) by mouth every 28 days    valsartan (DIOVAN) 160 mg tablet Take 1 tablet (160 mg total) by mouth daily    vitamin B-12 (VITAMIN B-12) 500 mcg tablet TAKE 2 TABLETS (1,000 MCG TOTAL) BY MOUTH DAILY    [DISCONTINUED] baclofen 10 mg tablet Take 1 tablet (10 mg total) by mouth 2 (two) times a day as needed for muscle spasms    [DISCONTINUED] gabapentin (Neurontin) 600 MG tablet Take 1 tablet (600 mg total) by mouth 3 (three) times a day     No current facility-administered medications on file prior to visit         No Known Allergies    Physical Exam:    /89   Pulse 83   Ht 5' 1" (1 549 m)   Wt 87 1 kg (192 lb)   BMI 36 28 kg/m²     Constitutional:obese  Eyes:anicteric  HEENT:grossly intact  Neck:supple, symmetric, trachea midline and no masses   Pulmonary:even and unlabored  Cardiovascular:No edema or pitting edema present  Skin:Normal without rashes or lesions and well hydrated  Psychiatric:Mood and affect appropriate  Neurologic:Cranial Nerves II-XII grossly intact  Musculoskeletal:antalgic and Tender to palpate right piriformis muscle, patient has positive right-sided piriformis stretch test, Peggy Jacob and Sandie this maneuvers  Patient has positive right-sided MARSHAL, internal and external rotation and push-pull testing  Imaging    Patient is here for a follow up for back pain that radiates down the right leg and into the right groin

## 2022-03-02 NOTE — TELEPHONE ENCOUNTER
Patient called back at [phone] to apologize that she forgot her appt today  She was at another appointment and is now in a lot of pain and forgot to come to her appt today       R/s for 3/4/22 at 1130 am

## 2022-03-02 NOTE — TELEPHONE ENCOUNTER
Late entry    Patient left VM I could not fully understand what patient was stating about a 8:30 appointment and about her 1pm appointment with infusion today  I called patient back left her a voicemail stating I could not understand her voicemail she had left her us and to please give us a call back  I left a call back number and asked for a return call

## 2022-03-04 ENCOUNTER — HOSPITAL ENCOUNTER (OUTPATIENT)
Dept: INFUSION CENTER | Facility: CLINIC | Age: 57
Discharge: HOME | End: 2022-03-04
Payer: COMMERCIAL

## 2022-03-04 DIAGNOSIS — E53.8 B12 DEFICIENCY: Primary | ICD-10-CM

## 2022-03-04 PROCEDURE — 96372 THER/PROPH/DIAG INJ SC/IM: CPT

## 2022-03-04 RX ORDER — CYANOCOBALAMIN 1000 UG/ML
1000 INJECTION INTRAMUSCULAR; SUBCUTANEOUS ONCE
Status: COMPLETED | OUTPATIENT
Start: 2022-03-04 | End: 2022-03-04

## 2022-03-04 RX ORDER — CYANOCOBALAMIN 1000 UG/ML
1000 INJECTION INTRAMUSCULAR; SUBCUTANEOUS ONCE
Status: CANCELLED | OUTPATIENT
Start: 2022-03-09

## 2022-03-04 RX ADMIN — CYANOCOBALAMIN 1000 MCG: 1000 INJECTION, SOLUTION INTRAMUSCULAR; SUBCUTANEOUS at 11:38

## 2022-03-04 NOTE — PROGRESS NOTES
Pt to clinic for B12 injection  Pt tolerated injection in LEFT ARM without complaints   Pt aware of next apt, declined AVS

## 2022-03-07 ENCOUNTER — HOSPITAL ENCOUNTER (OUTPATIENT)
Dept: RADIOLOGY | Facility: CLINIC | Age: 57
Discharge: HOME | End: 2022-03-07
Attending: PHYSICAL MEDICINE & REHABILITATION | Admitting: PHYSICAL MEDICINE & REHABILITATION
Payer: COMMERCIAL

## 2022-03-07 VITALS
SYSTOLIC BLOOD PRESSURE: 145 MMHG | OXYGEN SATURATION: 98 % | TEMPERATURE: 96.4 F | HEART RATE: 84 BPM | RESPIRATION RATE: 18 BRPM | DIASTOLIC BLOOD PRESSURE: 75 MMHG

## 2022-03-07 DIAGNOSIS — M62.838 SPASM OF RIGHT PIRIFORMIS MUSCLE: ICD-10-CM

## 2022-03-07 PROCEDURE — 77002 NEEDLE LOCALIZATION BY XRAY: CPT | Performed by: PHYSICAL MEDICINE & REHABILITATION

## 2022-03-07 PROCEDURE — 20552 NJX 1/MLT TRIGGER POINT 1/2: CPT | Performed by: PHYSICAL MEDICINE & REHABILITATION

## 2022-03-07 RX ORDER — 0.9 % SODIUM CHLORIDE 0.9 %
10 VIAL (ML) INJECTION ONCE
Status: COMPLETED | OUTPATIENT
Start: 2022-03-07 | End: 2022-03-07

## 2022-03-07 RX ORDER — BUPIVACAINE HCL/PF 2.5 MG/ML
10 VIAL (ML) INJECTION ONCE
Status: COMPLETED | OUTPATIENT
Start: 2022-03-07 | End: 2022-03-07

## 2022-03-07 RX ORDER — METHYLPREDNISOLONE ACETATE 80 MG/ML
80 INJECTION, SUSPENSION INTRA-ARTICULAR; INTRALESIONAL; INTRAMUSCULAR; PARENTERAL; SOFT TISSUE ONCE
Status: COMPLETED | OUTPATIENT
Start: 2022-03-07 | End: 2022-03-07

## 2022-03-07 RX ADMIN — IOHEXOL 1 ML: 300 INJECTION, SOLUTION INTRAVENOUS at 10:56

## 2022-03-07 RX ADMIN — BUPIVACAINE HYDROCHLORIDE 3 ML: 2.5 INJECTION, SOLUTION EPIDURAL; INFILTRATION; INTRACAUDAL at 10:56

## 2022-03-07 RX ADMIN — Medication 2 ML: at 10:55

## 2022-03-07 RX ADMIN — METHYLPREDNISOLONE ACETATE 80 MG: 80 INJECTION, SUSPENSION INTRA-ARTICULAR; INTRALESIONAL; INTRAMUSCULAR; PARENTERAL; SOFT TISSUE at 10:56

## 2022-03-07 RX ADMIN — SODIUM CHLORIDE 2 ML: 9 INJECTION, SOLUTION INTRAMUSCULAR; INTRAVENOUS; SUBCUTANEOUS at 10:55

## 2022-03-07 NOTE — DISCHARGE INSTR - LAB
Steroid Joint Injection   WHAT YOU NEED TO KNOW:   A steroid joint injection is a procedure to inject steroid medicine into a joint  Steroid medicine decreases pain and inflammation  The injection may also contain an anesthetic (numbing medicine) to decrease pain  It may be done to treat conditions such as arthritis, gout, or carpal tunnel syndrome  The injections may be given in your knee, ankle, shoulder, elbow, wrist, ankle or sacroiliac joint  Do not apply heat to any area that is numb  If you have discomfort or soreness at the injection site, you may apply ice today, 20 minutes on and 20 minutes off  Tomorrow you may use ice or warm, moist heat  Do not apply ice or heat directly to the skin  You may have an increase or change in the discomfort for 36-48 hours after your treatment  Apply ice and continue with any pain medicine you have been prescribed  Do not do anything strenuous today  You may shower, but no tub baths or hot tubs today  You may resume your normal activities tomorrow, but do not overdo it  Resume normal activities slowly when you are feeling better  If you experience redness, drainage or swelling at the injection site, or if you develop a fever above 100 degrees, please call [facility] at [phone] or go to the Emergency Room  Continue to take all routine medicines prescribed by your primary care physician unless otherwise instructed by our staff  Most blood thinners should be started again according to your regularly scheduled dosing  If you have any questions, please give our office a call  As no general anesthesia was used in today's procedure, you should not experience any side effects related to anesthesia  If you have a problem specifically related to your procedure, please call our office at [phone]  Problems not related to your procedure should be directed to your primary care physician

## 2022-03-07 NOTE — H&P
History of Present Illness: The patient is a 64 y o  female who presents with complaints of right sided buttock pain    Patient Active Problem List   Diagnosis    Colon polyp    Internal hemorrhoids    Family history of colorectal cancer    Vitamin D deficiency    Iron deficiency    H/O gastric bypass    B12 deficiency    Moderate episode of recurrent major depressive disorder (HCC)    Fibromyalgia    Chronic left-sided low back pain with left-sided sciatica    Bilateral hip pain    Obesity, Class II, BMI 35-39 9    Encounter for surgical aftercare following surgery of digestive system    Postsurgical malabsorption    Constipation    Bariatric surgery status    Sacroiliitis (HCC)    Piriformis syndrome of left side    Piriformis syndrome of right side    Trochanteric bursitis of both hips    Lumbar facet arthropathy    Osteopenia    Anxiety    Lumbar foraminal stenosis    Spinal stenosis of lumbar region with neurogenic claudication    Chronic pain syndrome       Past Medical History:   Diagnosis Date    Anxiety     Fibromyalgia     Medical history unknown     Osteopenia        Past Surgical History:   Procedure Laterality Date    CARPAL TUNNEL RELEASE Bilateral     21 y o  with left ulnar nerve release        CARPAL TUNNEL RELEASE Bilateral      SECTION      ELBOW SURGERY      Tennis elbow    GASTRIC BYPASS      REDUCTION MAMMAPLASTY Bilateral     WISDOM TOOTH EXTRACTION           Current Outpatient Medications:     ascorbic acid (VITAMIN C) 500 MG tablet, Take 500 mg by mouth daily, Disp: , Rfl:     baclofen 20 mg tablet, Take 1 tablet (20 mg total) by mouth 2 (two) times a day as needed for muscle spasms, Disp: 60 tablet, Rfl: 0    Cholecalciferol 25 MCG (1000 UT) tablet, Take 1,000 Units by mouth daily, Disp: , Rfl:     diazepam (VALIUM) 5 mg tablet, TAKE 1 TABLET BY MOUTH EVERY 12 HOURS AS NEEDED FOR ANXIETY, SLEEP, OR MUSCLE SPASMS, Disp: 60 tablet, Rfl: 0   doxepin (SINEquan) 150 MG capsule, Take 1 capsule (150 mg total) by mouth daily at bedtime, Disp: 90 capsule, Rfl: 1    ergocalciferol (VITAMIN D2) 50,000 units, Take 1 capsule (50,000 Units total) by mouth every 28 days, Disp: 4 capsule, Rfl: 2    gabapentin (NEURONTIN) 300 mg capsule, Take 1 capsule (300 mg total) by mouth 3 (three) times a day In addition to 600 mg TID for a total of 900 mg TID, Disp: 90 capsule, Rfl: 1    gabapentin (Neurontin) 600 MG tablet, Take 1 tablet (600 mg total) by mouth 3 (three) times a day, Disp: 90 tablet, Rfl: 2    valsartan (DIOVAN) 160 mg tablet, Take 1 tablet (160 mg total) by mouth daily, Disp: 90 tablet, Rfl: 1    vitamin B-12 (VITAMIN B-12) 500 mcg tablet, TAKE 2 TABLETS (1,000 MCG TOTAL) BY MOUTH DAILY, Disp: 60 tablet, Rfl: 2    Current Facility-Administered Medications:     bupivacaine (PF) (MARCAINE) 0 25 % injection 10 mL, 10 mL, Intra-articular, Once, Adron Sane, DO    iohexol (OMNIPAQUE) 300 mg/mL injection 50 mL, 50 mL, Intra-articular, Once, Adron Sane, DO    lidocaine (PF) (XYLOCAINE-MPF) 2 % injection 5 mL, 5 mL, Infiltration, Once, Adron Sane, DO    methylPREDNISolone acetate (DEPO-MEDROL) injection 80 mg, 80 mg, Intra-articular, Once, Adron Sane, DO    sodium chloride (PF) 0 9 % injection 10 mL, 10 mL, Infiltration, Once, Adron Sane, DO    No Known Allergies    Physical Exam: There were no vitals filed for this visit  General: Awake, Alert, Oriented x 3, Mood and affect appropriate  Respiratory: Respirations even and unlabored  Cardiovascular: Peripheral pulses intact; no edema  Musculoskeletal Exam:  Tenderness to palpation right-sided lumbar paraspinals and glutes    ASA Score: 2    Patient/Chart Verification  Patient ID Verified: Verbal  ID Band Applied: No  Consents Confirmed: Procedural,To be obtained in the Pre-Procedure area  H&P( within 30 days) Verified:  To be obtained in the Pre-Procedure area  Interval H&P(within 24 hr) Complete (required for Outpatients and Surgery Admit only): To be obtained in the Pre-Procedure area  Allergies Reviewed: Yes  Anticoag/NSAID held?: NA  Currently on antibiotics?: No  Pregnancy denied?: NA    Assessment:   1   Spasm of right piriformis muscle        Plan: right piriformis muscle injection with fluoroscopy

## 2022-03-08 ENCOUNTER — SOCIAL WORK (OUTPATIENT)
Dept: BEHAVIORAL/MENTAL HEALTH CLINIC | Facility: CLINIC | Age: 57
End: 2022-03-08
Payer: COMMERCIAL

## 2022-03-08 DIAGNOSIS — F33.1 MODERATE EPISODE OF RECURRENT MAJOR DEPRESSIVE DISORDER (HCC): Primary | ICD-10-CM

## 2022-03-08 PROCEDURE — 90834 PSYTX W PT 45 MINUTES: CPT | Performed by: COUNSELOR

## 2022-03-08 NOTE — PSYCH
Psychotherapy Provided: Individual Psychotherapy 50 minutes     Length of time in session: 50 minutes, follow up in 2 weeks    Encounter Diagnosis     ICD-10-CM    1  Moderate episode of recurrent major depressive disorder ([facility] [address] )  F33 1        Goals addressed in session: Goal 1     Pain:      moderate to severe    7    Current suicide risk : Low     Data: Doug Zaman reported feeling better due to less pain (increased Gabapentin) and not recommended for radio ablation  She seemed much better and reported using and increasing self-care through more relaxation, SPA services  Person-Centered, DBT-based, and CBT techniques were used to aid client's motivation and knowledge to counter the symptoms of depression and anxiety  Assessment: Doug Zaman seemed  A lot calmer and happier when pain is under control  She was talkative, positive, and with good thought content  She showed great deal of acceptance about her son's decision not to contact her  Plan: Doug Zaman agrees to increase self-care and fresh food along with using Acting-the-Opposite skills  She will come biweekly to talk about her experience and learn more skills  Behavioral Health Treatment Plan [facility]: Diagnosis and Treatment Plan explained to Julio Lezama relates understanding diagnosis and is agreeable to Treatment Plan   Yes

## 2022-03-09 ENCOUNTER — HOSPITAL ENCOUNTER (OUTPATIENT)
Dept: INFUSION CENTER | Facility: CLINIC | Age: 57
End: 2022-03-09

## 2022-03-09 ENCOUNTER — TELEPHONE (OUTPATIENT)
Dept: INFUSION CENTER | Facility: CLINIC | Age: 57
End: 2022-03-09

## 2022-03-09 NOTE — TELEPHONE ENCOUNTER
Pt called to cancel appointment and reschedule due to her son being in a car accident  Reschedule from today to tomorrow

## 2022-03-10 ENCOUNTER — HOSPITAL ENCOUNTER (OUTPATIENT)
Dept: INFUSION CENTER | Facility: CLINIC | Age: 57
Discharge: HOME | End: 2022-03-10
Payer: COMMERCIAL

## 2022-03-10 DIAGNOSIS — E53.8 B12 DEFICIENCY: Primary | ICD-10-CM

## 2022-03-10 PROCEDURE — 96372 THER/PROPH/DIAG INJ SC/IM: CPT

## 2022-03-10 RX ORDER — CYANOCOBALAMIN 1000 UG/ML
1000 INJECTION INTRAMUSCULAR; SUBCUTANEOUS ONCE
Status: COMPLETED | OUTPATIENT
Start: 2022-03-10 | End: 2022-03-10

## 2022-03-10 RX ORDER — CYANOCOBALAMIN 1000 UG/ML
1000 INJECTION INTRAMUSCULAR; SUBCUTANEOUS ONCE
Status: CANCELLED | OUTPATIENT
Start: 2022-03-11

## 2022-03-10 RX ADMIN — CYANOCOBALAMIN 1000 MCG: 1000 INJECTION, SOLUTION INTRAMUSCULAR at 15:12

## 2022-03-10 NOTE — PROGRESS NOTES
Patient here for final b12 dosing and is doing ok, repeat labs drawn on 1/31/22  She tolerated b12 to right deltoid as ordered, bandaid applied and CDI    Patient discharged post, further dosing to be determined by MD

## 2022-03-10 NOTE — PATIENT INSTRUCTIONS
March 2022 Sunday Monday Tuesday Wednesday Thursday Friday Saturday             1     2    OFFICE VISIT SHORT PG   8:15 AM   (15 min )   CHAYITO LOCKETT   [facility]    INF THERAPY PLAN  11:30 AM   (30 min )   AN INF QUICK CHAIR [address] 3     4    INF THERAPY PLAN  11:30 AM   (30 min )   AN INF QUICK CHAIR [address] 5            Cycle 1, Treatment 3    6     7    FL SPINE AND PAIN PROCEDURE  10:40 AM   (20 min )   AN SP 1   [facility] [place] 8    OFFICE VISIT LONG PG  11:45 AM   (60 min )   Erling Opitz, LPC   [facility] Therapist [place] 9     10    INF THERAPY PLAN   3:00 PM   (30 min )   AN INF QUICK CHAIR [address] 11     12           Cycle 1, Treatment 4     13     14     15     16     17     18     19                20     21     22    VIRTUAL VISIT PG   7:45 AM   (60 min )   Erling Opitz, LPC   [facility] Therapist [place] 23    DXA BONE DENSE SPINE HIP/PELV   1:30 PM   (30 min )   AN MAMMO [address]  [facility] 24     25  Happy Birthday!     89                90     35     38     46     33                               Treatment Details       3/4/2022 - Cycle 1, Treatment 3      Supportive Care: cyanocobalamin    3/10/2022 - Cycle 1, Treatment 4      Supportive Care: cyanocobalamin

## 2022-03-14 ENCOUNTER — TELEPHONE (OUTPATIENT)
Dept: PAIN MEDICINE | Facility: MEDICAL CENTER | Age: 57
End: 2022-03-14

## 2022-03-22 ENCOUNTER — TELEMEDICINE (OUTPATIENT)
Dept: BEHAVIORAL/MENTAL HEALTH CLINIC | Facility: CLINIC | Age: 57
End: 2022-03-22
Payer: COMMERCIAL

## 2022-03-22 DIAGNOSIS — F33.1 MODERATE EPISODE OF RECURRENT MAJOR DEPRESSIVE DISORDER (HCC): Primary | ICD-10-CM

## 2022-03-22 DIAGNOSIS — F41.9 ANXIETY: ICD-10-CM

## 2022-03-22 PROCEDURE — 90834 PSYTX W PT 45 MINUTES: CPT | Performed by: COUNSELOR

## 2022-03-22 NOTE — PSYCH
Virtual Regular Visit    Verification of patient location:    Patient is located in the following state in which I hold an active license [place]      Assessment/Plan:    Problem List Items Addressed This Visit        Other    Moderate episode of recurrent major depressive disorder ([address] ) - Primary    Anxiety          Goals addressed in session: Goal 1 and Goal 2          Reason for visit is No chief complaint on file  Encounter provider Michael Ortez [facility]    Provider located at [address] [place] [id number]  [phone]      Recent Visits  No visits were found meeting these conditions  Showing recent visits within past 7 days and meeting all other requirements  Future Appointments  No visits were found meeting these conditions  Showing future appointments within next 150 days and meeting all other requirements       The patient was identified by name and date of birth  Shekhar Monteiro was informed that this is a telemedicine visit and that the visit is being conducted throughDiagnovus and patient was informed that this is a secure, HIPAA-compliant platform  She agrees to proceed     My office door was closed  No one else was in the room  She acknowledged consent and understanding of privacy and security of the video platform  The patient has agreed to participate and understands they can discontinue the visit at any time  Patient is aware this is a billable service  Lauryn Monroe is a 64 y o  female  Data: Yvonne Greenfield reported feeling better with her neck pain- she stated that they want to make sure it is not coming from her hips-she was encouraged to follow medical advice and go for screening  She shared about osteoporosis running in her family  A phone call was used for improving the audio connection   Person-Centered, DBT-based, and CBT techniques were used to support Hiral's effort to decrease the negative self-talk  Assessment: Mason Briones was talkative, positive, and engaged  She managed to focus on self-care and provided a good plan for increasing attendance of appointments  Plan: Mason Briones will go for osteoporosis screening as well as screening of her hips that are under scrutiny as reasons for her back and neck unresolved pain  She will increase self-care and use Mindfulness to separate from her emotions  HPI     Past Medical History:   Diagnosis Date    Anxiety     Fibromyalgia     Medical history unknown     Osteopenia        Past Surgical History:   Procedure Laterality Date    CARPAL TUNNEL RELEASE Bilateral     21 y o  with left ulnar nerve release        CARPAL TUNNEL RELEASE Bilateral      SECTION      ELBOW SURGERY      Tennis elbow    GASTRIC BYPASS      REDUCTION MAMMAPLASTY Bilateral     WISDOM TOOTH EXTRACTION         Current Outpatient Medications   Medication Sig Dispense Refill    ascorbic acid (VITAMIN C) 500 MG tablet Take 500 mg by mouth daily      baclofen 20 mg tablet Take 1 tablet (20 mg total) by mouth 2 (two) times a day as needed for muscle spasms 60 tablet 0    Cholecalciferol 25 MCG (1000 UT) tablet Take 1,000 Units by mouth daily      diazepam (VALIUM) 5 mg tablet TAKE 1 TABLET BY MOUTH EVERY 12 HOURS AS NEEDED FOR ANXIETY, SLEEP, OR MUSCLE SPASMS 60 tablet 2    doxepin (SINEquan) 150 MG capsule Take 1 capsule (150 mg total) by mouth daily at bedtime 90 capsule 1    ergocalciferol (VITAMIN D2) 50,000 units Take 1 capsule (50,000 Units total) by mouth every 28 days 4 capsule 2    gabapentin (NEURONTIN) 300 mg capsule Take 1 capsule (300 mg total) by mouth 3 (three) times a day In addition to 600 mg TID for a total of 900 mg TID 90 capsule 1    gabapentin (Neurontin) 600 MG tablet Take 1 tablet (600 mg total) by mouth 3 (three) times a day 90 tablet 2    valsartan (DIOVAN) 160 mg tablet Take 1 tablet (160 mg total) by mouth daily 90 tablet 1    vitamin B-12 (VITAMIN B-12) 500 mcg tablet TAKE 2 TABLETS (1,000 MCG TOTAL) BY MOUTH DAILY 60 tablet 2     No current facility-administered medications for this visit  No Known Allergies    Review of Systems    Video Exam    There were no vitals filed for this visit  Physical Exam     I spent 47 minutes directly with the patient during this visit    [facility] verbally agrees to participate in [employer]  Pt is aware that [employer] could be limited without vital signs or the ability to perform a full hands-on physical Guo Los understands she or the provider may request at any time to terminate the video visit and request the patient to seek care or treatment in person

## 2022-03-23 ENCOUNTER — HOSPITAL ENCOUNTER (OUTPATIENT)
Dept: RADIOLOGY | Facility: MEDICAL CENTER | Age: 57
Discharge: HOME | End: 2022-03-23
Payer: COMMERCIAL

## 2022-03-23 DIAGNOSIS — Z13.820 SCREENING FOR OSTEOPOROSIS: ICD-10-CM

## 2022-03-23 DIAGNOSIS — M85.80 OSTEOPENIA, UNSPECIFIED LOCATION: ICD-10-CM

## 2022-03-23 PROCEDURE — 77080 DXA BONE DENSITY AXIAL: CPT

## 2022-03-24 ENCOUNTER — TELEPHONE (OUTPATIENT)
Dept: FAMILY MEDICINE CLINIC | Facility: CLINIC | Age: 57
End: 2022-03-24

## 2022-03-24 ENCOUNTER — APPOINTMENT (OUTPATIENT)
Dept: RADIOLOGY | Facility: CLINIC | Age: 57
End: 2022-03-24
Payer: COMMERCIAL

## 2022-03-24 DIAGNOSIS — M25.551 CHRONIC RIGHT HIP PAIN: ICD-10-CM

## 2022-03-24 DIAGNOSIS — G89.29 CHRONIC RIGHT HIP PAIN: ICD-10-CM

## 2022-03-24 PROCEDURE — 73502 X-RAY EXAM HIP UNI 2-3 VIEWS: CPT

## 2022-04-04 ENCOUNTER — TELEPHONE (OUTPATIENT)
Dept: FAMILY MEDICINE CLINIC | Facility: CLINIC | Age: 57
End: 2022-04-04

## 2022-04-04 NOTE — TELEPHONE ENCOUNTER
Pt called and stated that she received a letter that her Prolia is not covered, she wants to know what her next step is? Please call back

## 2022-04-05 ENCOUNTER — SOCIAL WORK (OUTPATIENT)
Dept: BEHAVIORAL/MENTAL HEALTH CLINIC | Facility: CLINIC | Age: 57
End: 2022-04-05
Payer: COMMERCIAL

## 2022-04-05 DIAGNOSIS — F41.9 ANXIETY: ICD-10-CM

## 2022-04-05 DIAGNOSIS — F33.1 MODERATE EPISODE OF RECURRENT MAJOR DEPRESSIVE DISORDER (HCC): Primary | ICD-10-CM

## 2022-04-05 PROCEDURE — 90834 PSYTX W PT 45 MINUTES: CPT | Performed by: COUNSELOR

## 2022-04-05 NOTE — PSYCH
Psychotherapy Provided: Individual Psychotherapy 48 minutes     Length of time in session: 48 minutes, follow up in 2 week    Encounter Diagnosis     ICD-10-CM    1  Moderate episode of recurrent major depressive disorder (HCC)  F33 1    2  Anxiety  F41 9        Goals addressed in session: Goal 1 and Goal 2     Pain:      moderate to severe    6    Current suicide risk : Low     Data: Anthony Fernandes discussed her chronic and debilitating spine pain  Person-Centered, DBT-based, and CBT techniques were used to support Hiral's effort to change her self-defeating talk- she shared that she increased outdoor activities, more sun exposure, animal-based activities, walking, and socialization  Assessment: Anthony Fernandes showed relevant thought content and insight  Sufficient eye contact was observed as well as positive attitude  Plan: Continue with increased outdoor activity, sun exposure, and animal-based activity  She will remind herself to thought replace and include positive self-affirmations as well as actig-the-opposite  Behavioral Health Treatment Plan [facility]: Diagnosis and Treatment Plan explained to Jacinta Mendez relates understanding diagnosis and is agreeable to Treatment Plan   Yes

## 2022-04-09 DIAGNOSIS — I15.8 OTHER SECONDARY HYPERTENSION: ICD-10-CM

## 2022-04-11 RX ORDER — VALSARTAN 160 MG/1
TABLET ORAL
Qty: 30 TABLET | Refills: 5 | Status: SHIPPED | OUTPATIENT
Start: 2022-04-11

## 2022-04-19 ENCOUNTER — OFFICE VISIT (OUTPATIENT)
Dept: PAIN MEDICINE | Facility: CLINIC | Age: 57
End: 2022-04-19
Payer: COMMERCIAL

## 2022-04-19 ENCOUNTER — OFFICE VISIT (OUTPATIENT)
Dept: PSYCHIATRY | Facility: CLINIC | Age: 57
End: 2022-04-19

## 2022-04-19 VITALS
WEIGHT: 192 LBS | BODY MASS INDEX: 36.28 KG/M2 | DIASTOLIC BLOOD PRESSURE: 88 MMHG | SYSTOLIC BLOOD PRESSURE: 147 MMHG | HEART RATE: 73 BPM

## 2022-04-19 DIAGNOSIS — M70.62 TROCHANTERIC BURSITIS OF BOTH HIPS: ICD-10-CM

## 2022-04-19 DIAGNOSIS — G89.29 CHRONIC LEFT-SIDED LOW BACK PAIN WITH LEFT-SIDED SCIATICA: ICD-10-CM

## 2022-04-19 DIAGNOSIS — M70.61 TROCHANTERIC BURSITIS OF BOTH HIPS: ICD-10-CM

## 2022-04-19 DIAGNOSIS — G57.01 PIRIFORMIS SYNDROME OF RIGHT SIDE: ICD-10-CM

## 2022-04-19 DIAGNOSIS — M54.42 CHRONIC LEFT-SIDED LOW BACK PAIN WITH LEFT-SIDED SCIATICA: ICD-10-CM

## 2022-04-19 DIAGNOSIS — M46.1 SACROILIITIS (HCC): ICD-10-CM

## 2022-04-19 DIAGNOSIS — M47.816 LUMBAR FACET ARTHROPATHY: ICD-10-CM

## 2022-04-19 DIAGNOSIS — M48.061 LUMBAR FORAMINAL STENOSIS: ICD-10-CM

## 2022-04-19 DIAGNOSIS — F41.1 GENERALIZED ANXIETY DISORDER: ICD-10-CM

## 2022-04-19 DIAGNOSIS — F33.1 MODERATE EPISODE OF RECURRENT MAJOR DEPRESSIVE DISORDER (HCC): Primary | ICD-10-CM

## 2022-04-19 DIAGNOSIS — G89.4 CHRONIC PAIN SYNDROME: Primary | ICD-10-CM

## 2022-04-19 PROCEDURE — 99214 OFFICE O/P EST MOD 30 MIN: CPT | Performed by: PHYSICAL MEDICINE & REHABILITATION

## 2022-04-19 RX ORDER — DULOXETIN HYDROCHLORIDE 30 MG/1
30 CAPSULE, DELAYED RELEASE ORAL DAILY
Qty: 30 CAPSULE | Refills: 1 | Status: SHIPPED | OUTPATIENT
Start: 2022-04-19 | End: 2022-06-09

## 2022-04-19 NOTE — PROGRESS NOTES
Assessment:  1  Chronic pain syndrome    2  Chronic left-sided low back pain with left-sided sciatica    3  Piriformis syndrome of right side    4  Sacroiliitis ([address] )    5  Trochanteric bursitis of both hips    6  Lumbar facet arthropathy    7  Lumbar foraminal stenosis        Plan:  Ms Juanpablo Godoy is a pleasant 60-year-old female who presents for follow-up and re-evaluation regarding continued chronic low back pain with radiating symptoms into the right worse than left lower extremity  We have now attempted 3 different interventional approaches manage her pain including SI joint injection, piriformis injection as well as lumbar epidural at L5-S1 with all short-lived relief in her pain  Extensive conversation regarding medication, therapeutic and interventional approach to manage her pain were discussed  She does not wish to proceed with more injections at this time and is looking for more long-term relief  Extensive conversation regarding neuromodulation was discussed and patient is interested in pursuing this direction  As such we will   1  I do believe the patient may be a good candidate for a spinal cord stimulator for chronic pain  I reviewed the nature of the neurostimulation in detail, discussed the role of the trial as well as the permanent implantation  The technology as well as the approach was demonstrated using models and additional literature was provided  Plan:  #1 we will obtain the requisite psychological authorization/clearance to rule out any significant psychological comorbidity that would prevent the patient from benefiting    #2 [address] STENOSIS THAT WOULD PREVENT LEAD PASSAGE AND TO RULE OUT PATHOLOGY THAT [facility]        #3 we will make arrangements for the patient to participate in an patient education session as well      Complete risks and benefits including bleeding, infection, tissue reaction, allergic reaction, nerve injury, paralysis, CSF leak/spinal headache were reviewed  My impressions and treatment recommendations were discussed in detail with the patient who verbalized understanding and had no further questions  Discharge instructions were provided  I personally saw and examined the patient and I agree with the above discussed plan of care  Orders Placed This Encounter   Procedures    MRI thoracic spine without contrast     Standing Status:   Future     Standing Expiration Date:   4/19/2026     Scheduling Instructions: There is no preparation for this test  Please leave your jewelry and valuables at home, wedding rings are the exception  Magnetic nail polish must be removed prior to arrival for your test  Please bring your insurance cards, a form of photo ID and a list of your medications with you  Arrive 15 minutes prior to your appointment time in order to register  Please bring any prior CT or MRI studies of this area that were not performed at a [facility]  To schedule this appointment, please contact Central Scheduling at [phone]  Prior to your appointment, please make sure you complete the MRI Screening Form when you e-Check in for your appointment  This will be available starting 7 days before your appointment in [address]  You may receive an e-mail with an activation code if you do not have a [employer] account  If you do not have access to a device, we will complete your screening at your appointment  Order Specific Question:   What is the patient's sedation requirement? Answer:   No Sedation     Order Specific Question:   Is the patient pregnant? Answer:   No     Order Specific Question:   Release to patient through [employer]     Answer:   Immediate     Order Specific Question:   Is order priority selected as STAT?      Answer:   No     Order Specific Question:   Reason for Exam (FREE TEXT)     Answer:   Plan for SCS    Ambulatory Referral to Psychiatry     Standing Status:   Future     Standing Expiration Date:   4/19/2023     Referral Priority:   Routine     Referral Type:   Consult - AMB     Referral Reason:   Specialty Services Required     Requested Specialty:   Psychiatry     Number of Visits Requested:   1     Expiration Date:   4/19/2023     No orders of the defined types were placed in this encounter  History of Present Illness:  James Tian is a 62 y o  female who presents for a follow up office visit in regards to Hip Pain (right side)  The patients current symptoms include neck, low back, right hip pain currently rated 6/10 and described as a constant throbbing, shooting, aching, stabbing sensation that is worse in the morning in the evening  Presents today for follow-up and re-evaluation  I have personally reviewed and/or updated the patient's past medical history, past surgical history, family history, social history, current medications, allergies, and vital signs today  Review of Systems   Respiratory: Negative for shortness of breath  Cardiovascular: Negative for chest pain  Gastrointestinal: Negative for constipation, diarrhea, nausea and vomiting  Musculoskeletal: Positive for joint swelling  Negative for arthralgias, gait problem and myalgias  Skin: Negative for rash  Neurological: Negative for dizziness, seizures and weakness  All other systems reviewed and are negative        Patient Active Problem List   Diagnosis    Colon polyp    Internal hemorrhoids    Family history of colorectal cancer    Vitamin D deficiency    Iron deficiency    H/O gastric bypass    B12 deficiency    Moderate episode of recurrent major depressive disorder (HCC)    Fibromyalgia    Chronic left-sided low back pain with left-sided sciatica    Bilateral hip pain    Obesity, Class II, BMI 35-39 9    Encounter for surgical aftercare following surgery of digestive system    Postsurgical malabsorption    Constipation    Bariatric surgery status    Sacroiliitis (HCC)    Piriformis syndrome of left side    Piriformis syndrome of right side    Trochanteric bursitis of both hips    Lumbar facet arthropathy    Osteopenia    Anxiety    Lumbar foraminal stenosis    Spinal stenosis of lumbar region with neurogenic claudication    Chronic pain syndrome    Spasm of right piriformis muscle       Past Medical History:   Diagnosis Date    Anxiety     Fibromyalgia     Medical history unknown     Osteopenia        Past Surgical History:   Procedure Laterality Date    CARPAL TUNNEL RELEASE Bilateral     21 y o  with left ulnar nerve release        CARPAL TUNNEL RELEASE Bilateral      SECTION      ELBOW SURGERY      Tennis elbow    GASTRIC BYPASS      REDUCTION MAMMAPLASTY Bilateral     WISDOM TOOTH EXTRACTION         Family History   Problem Relation Age of Onset    Ovarian cancer Mother     Heart disease Mother     Colon cancer Father     No Known Problems Maternal Grandmother     No Known Problems Maternal Grandfather     Diabetes Paternal Grandmother     No Known Problems Paternal Grandfather     No Known Problems Son     Leukemia Maternal Uncle     Diabetes Paternal Aunt     Diabetes Paternal Uncle        Social History     Occupational History    Not on file   Tobacco Use    Smoking status: Never Smoker    Smokeless tobacco: Never Used   Vaping Use    Vaping Use: Never used   Substance and Sexual Activity    Alcohol use: No    Drug use: Never    Sexual activity: Not on file       Current Outpatient Medications on File Prior to Visit   Medication Sig    ascorbic acid (VITAMIN C) 500 MG tablet Take 500 mg by mouth daily    baclofen 20 mg tablet Take 1 tablet (20 mg total) by mouth 2 (two) times a day as needed for muscle spasms    Cholecalciferol 25 MCG (1000 UT) tablet Take 1,000 Units by mouth daily    diazepam (VALIUM) 5 mg tablet TAKE 1 TABLET BY MOUTH EVERY 12 HOURS AS NEEDED FOR ANXIETY, SLEEP, OR MUSCLE SPASMS    doxepin (SINEquan) 150 MG capsule Take 1 capsule (150 mg total) by mouth daily at bedtime    ergocalciferol (VITAMIN D2) 50,000 units Take 1 capsule (50,000 Units total) by mouth every 28 days    gabapentin (NEURONTIN) 300 mg capsule Take 1 capsule (300 mg total) by mouth 3 (three) times a day In addition to 600 mg TID for a total of 900 mg TID    gabapentin (Neurontin) 600 MG tablet Take 1 tablet (600 mg total) by mouth 3 (three) times a day    valsartan (DIOVAN) 160 mg tablet TAKE 1 TABLET BY MOUTH EVERY DAY    vitamin B-12 (VITAMIN B-12) 500 mcg tablet TAKE 2 TABLETS (1,000 MCG TOTAL) BY MOUTH DAILY    [DISCONTINUED] denosumab (PROLIA) 60 mg/mL Inject 1 mL (60 mg total) under the skin once for 1 dose     No current facility-administered medications on file prior to visit  No Known Allergies    Physical Exam:    /88   Pulse 73   Wt 87 1 kg (192 lb)   BMI 36 28 kg/m²     Constitutional:normal, well developed, well nourished, alert, in no distress and non-toxic and no overt pain behavior  Eyes:anicteric  HEENT:grossly intact  Neck:supple, symmetric, trachea midline and no masses   Pulmonary:even and unlabored  Cardiovascular:No edema or pitting edema present  Skin:Normal without rashes or lesions and well hydrated  Psychiatric:Mood and affect appropriate  Neurologic:Cranial Nerves II-XII grossly intact  Musculoskeletal:antalgic    Imaging    RIGHT HIP     INDICATION:   M25 551: Pain in right hip  G89 29:  Other chronic pain      COMPARISON:  4/27/2021     VIEWS:  XR HIP/PELV 2-3 VWS RIGHT W PELVIS IF PERFORMED         FINDINGS:     There is no acute fracture or dislocation      Mild bilateral hip osteoarthritis      No lytic or blastic osseous lesion      Soft tissues are unremarkable      The visualized lumbar spine is unremarkable      IMPRESSION:     No acute osseous abnormality      Mild bilateral hip osteoarthritis

## 2022-04-19 NOTE — PATIENT INSTRUCTIONS
Chronic Pain   WHAT YOU NEED TO KNOW:   Chronic pain is pain that does not get better for 3 months or longer  Chronic pain may hurt all the time, or come and go  DISCHARGE INSTRUCTIONS:   Call your local emergency number or have someone else call (911 in the [address]) if:   · You are breathing slower than normal, or you have trouble breathing  · You cannot be awakened  · You have a seizure  Call your doctor if:   · Your heart feels like it is jumping or fluttering  · You cannot think clearly  · You have side effects from prescription pain medicine, such as itching, nausea, or vomiting  · You have trouble sleeping  · Your pain gets worse, even after you take medicine  · You don't think the medicine is working  · You have questions or concerns about your condition or care  Medicines: You may need any of the following:  · Acetaminophen  decreases pain and fever  It is available without a doctor's order  Ask how much to take and how often to take it  Follow directions  Read the labels of all other medicines you are using to see if they also contain acetaminophen, or ask your doctor or pharmacist  Acetaminophen can cause liver damage if not taken correctly  Do not use more than 4 grams (4,000 milligrams) total of acetaminophen in one day  · NSAIDs , such as ibuprofen, help decrease swelling, pain, and fever  This medicine is available with or without a doctor's order  NSAIDs can cause stomach bleeding or kidney problems in certain people  If you take blood thinner medicine, always ask your healthcare provider if NSAIDs are safe for you  Always read the medicine label and follow directions  · Prescription pain medicine  called narcotics or opioids may be given for certain types of chronic pain  Ask your healthcare provider how to take this medicine safely  · Anesthetics  can be rubbed on your skin or injected into a nerve or muscle to numb an area      · Other medicines  may reduce pain, anxiety, muscle tension, or swelling  · Take your medicine as directed  Contact your healthcare provider if you think your medicine is not helping or if you have side effects  Tell him of her if you are allergic to any medicine  Keep a list of the medicines, vitamins, and herbs you take  Include the amounts, and when and why you take them  Bring the list or the pill bottles to follow-up visits  Carry your medicine list with you in case of an emergency  Manage your chronic pain:   · Apply heat  on the area in pain for 20 to 30 minutes every 2 hours for as many days as directed  Heat helps decrease pain and muscle spasms  · Apply ice  on the part of your body that hurts for 15 to 20 minutes every hour or as directed  Use an ice pack, or put crushed ice in a plastic bag  Cover it with a towel  Ice decreases pain and swelling, and helps prevent tissue damage  · Go to physical therapy as directed  A physical therapist teaches you exercises to help improve movement and strength, and to decrease pain  · Exercise for 30 minutes, 3 times a week  Regular physical activity can help decrease pain and improve your quality of life  Ask your healthcare provider about the best exercise plan for your type of pain  · Get enough sleep  Create a relaxing bedtime routine  Go to sleep and wake up at the same time every day  Avoid caffeine in the afternoon  · Talk with a counselor or therapist   A type of counseling called cognitive behavioral therapy (CBT) can help your chronic pain by changing the way you think about it  CBT can also improve your mood, sleep, and ability to move  What you must know if you take narcotic pain medicine:   · You may need to take a bowel movement softener  The most common side effect of prescription pain medicine is constipation  Bowel movement softeners are available over the counter  · Do not mix prescription pain medicines    This can cause an overdose of medicine, which can become life-threatening  Read labels  Make sure you know the ingredients in all of your medicines  · Do not drink alcohol  when you take prescription pain medicine  It is not safe to mix narcotics or opioids with alcohol or illegal drugs  · Prescription pain medicine may impair your ability to drive or work safely  They may also cause dizziness and increase your risk for falling  · Store prescription pain medicine in a safe location at home  Keep your medicine away from children and other people  Never share your medicine with anyone  Follow up with your healthcare provider as directed: You may be referred to a pain specialist  Write down your questions so you remember to ask them during your visits  © Copyright [employer] 2022 Information is for End User's use only and may not be sold, redistributed or otherwise used for commercial purposes  All illustrations and images included in CareNotes® are the copyrighted property of [employer]  or [employer]ethro Conway   The above information is an  only  It is not intended as medical advice for individual conditions or treatments  Talk to your doctor, nurse or pharmacist before following any medical regimen to see if it is safe and effective for you
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
## 2022-04-19 NOTE — PATIENT INSTRUCTIONS
Start Cymbalta 30 mg daily  Continue other medications as usual   Please call the office nursing staff for medication issues including refills, problems getting medications, bothersome side effects, etc at [phone]      Please return for a follow up appointment as discussed  If you are running late or are unable to attend your appointment, please call our [place] office at [phone], or if you were seen in the [place] office, please call [phone]     ------------------------------------------------------------------  Sleep Hygiene Tips for Improving Insomnia:    1  Sleep in a dark, quiet environment with comfortable bedding  Use light-blocking curtains to darken your room if needed, and consider using ear plugs and/or a white noise machine if you cannot control the noise in your environment  2    Set a sleep schedule so that you get up at the same time seven days a week  Avoid sleeping in for more than an hour on days off     3    Avoid caffeine in the afternoon, particularly 6-8 hours before bedtime  Considering cutting down on caffeine by decreasing by one caffeinated beverage every 3 days until you are no longer consuming caffeine  4    Exercise regularly, but try to avoid vigorous exercise within 2 hours of bedtime  5    Avoid smoking near bedtime  6    Avoid alcohol before bed  If you consume one alcoholic beverage, allow 3 hours between that drink and bedtime  If you consume two alcoholic beverages, allow 5 hours between those drinks and bedtime  Alcohol may lead to early waking or waking in the middle of the night  7    Set aside time to wind down in the evening without electronics  Try deep breathing, mindfulness meditation, or other relaxation techniques  Free sleep meditations can be found online, e g  insighttimer  com/meditation-topics/sleep    8  Use your bedroom for sleep only    Do not watch TV or use electronics with a screen (computer, phone, tablet etc ) in bed  9    Turn the clock away so you cannot see it in bed  10  Only go to bed when you are feeling sleepy  Otherwise, do something relaxing without electronics in another room (reading a magazine article, solitaire with a deck of cards)  11  Stimulus Control: If you are lying in bed for 15-20 minutes (estimated because the clock is turned away so you cannot see it) and you are not asleep, get up and do something relaxing in a different room  Do this in the middle of the night as well if you are awake  Avoid doing work or getting on the computer/electronics with screens  12  Avoid napping except for driving safety  If you feel too sleepy to drive, do not drive  If you get sleepy while driving, pull over and nap  You may resume driving once you feel alert  13  Read "No More Sleepless Nights" by Hiram Degroot PhD     14  There are some on-line resources that require a fee that can be of help  Two credible websites are:   http://[employer]/cbt-online-insomnia-treatment html   [employer]visor at    15  Apps to explore:   CBT-I    Go! To Sleep by the [address] more at [employer]/sleep-hygiene    Short-term effects of sleep deprivation   Mood changes and irritability   Loss of concentration and memory   Difficulty learning new things   Reduced coordination   Disturbed creativity   Impaired decision-making abilities    Long-term risks of sleep deprivation   Depression and anxiety   High blood pressure   Diabetes   Heart attack and stroke   Obesity   Immune deficiency   Decreased fertility    ------------------------------------------------------------------      If you have thoughts of harming yourself or are otherwise in psychological crisis, do not hesitate to contact your [employer], or go to the nearest emergency room    [facility] Crisis: [address] [facility] Crisis: [address] [facility] [place] Crisis: [phone]  [facility] Crisis: [address] [place] Crisis: [address] Crisis: [address] [facility] Crisis: [address] Suicide Prevention Hotline: 1-309.215.2086

## 2022-04-19 NOTE — BH TREATMENT PLAN
TREATMENT PLAN        [facility]    Name and Date of Birth:  Vaishali Smith 62 y o  1965  Date of Treatment Plan: April 19, 2022  Diagnosis/Diagnoses:    1  Moderate episode of recurrent major depressive disorder ([address] )    2  Generalized anxiety disorder        Strengths/Personal Resources for Self-Care: supportive friends, independence, motivation for treatment    Area/Areas of need: anxiety, depression    Long Term Goal: "Be able to relax"  Target Date: 6 months - October 19, 2022  Person/Persons responsible for completion of goal: Zack and Sherwin Sullivan MD     Short Term Objective (s) - How will we reach this goal?:   Take medications as prescribed  Attend psychiatry appointments regularly  Continue psychotherapy regularly  Try sleep hygiene techniques  Target Date: 3 months - July 19, 2022  Person/Persons Responsible for Completion of Goal: Zack     Progress Towards Goals: Continuing treatment    Treatment Modality: medication management every 4-6 weeks  Review due 180 days from date of this plan: October 16, 2022   Expected length of service: Ongoing treatment    My physician and I have developed this plan together, and I agree to work on the goals and objectives  I understand the treatment goals that were developed for my treatment  The treatment plan was created between Sherwin Sullivan MD and Vaishali Luis on 04/19/22 at 4:04 PM but not signed at the time of the visit due to [address] [place] [address]  The plan was reviewed, and verbal consent was given

## 2022-04-20 ENCOUNTER — TELEPHONE (OUTPATIENT)
Dept: PAIN MEDICINE | Facility: CLINIC | Age: 57
End: 2022-04-20

## 2022-04-20 ENCOUNTER — TELEMEDICINE (OUTPATIENT)
Dept: BEHAVIORAL/MENTAL HEALTH CLINIC | Facility: CLINIC | Age: 57
End: 2022-04-20

## 2022-04-20 DIAGNOSIS — F41.9 ANXIETY: ICD-10-CM

## 2022-04-20 DIAGNOSIS — F33.1 MODERATE EPISODE OF RECURRENT MAJOR DEPRESSIVE DISORDER (HCC): Primary | ICD-10-CM

## 2022-04-20 NOTE — PSYCH
Virtual Regular VisitNo Call  No Show  No Charge    Ronny Mathis no showed 04/20/22 appointment  She was given a call- no answer  A voicemail message was left asking for call back and making sure she has no difficulties using the link for her virtual session  Treatment Plan not due at this session

## 2022-04-25 DIAGNOSIS — M46.1 SACROILIITIS (HCC): ICD-10-CM

## 2022-04-25 DIAGNOSIS — M70.62 GREATER TROCHANTERIC BURSITIS OF BOTH HIPS: ICD-10-CM

## 2022-04-25 DIAGNOSIS — M47.816 LUMBAR FACET ARTHROPATHY: ICD-10-CM

## 2022-04-25 DIAGNOSIS — M70.61 GREATER TROCHANTERIC BURSITIS OF BOTH HIPS: ICD-10-CM

## 2022-04-25 DIAGNOSIS — M25.552 BILATERAL HIP PAIN: ICD-10-CM

## 2022-04-25 DIAGNOSIS — G57.01 PIRIFORMIS SYNDROME OF RIGHT SIDE: ICD-10-CM

## 2022-04-25 DIAGNOSIS — M25.551 BILATERAL HIP PAIN: ICD-10-CM

## 2022-04-25 DIAGNOSIS — G57.02 PIRIFORMIS SYNDROME OF LEFT SIDE: ICD-10-CM

## 2022-04-25 DIAGNOSIS — G89.4 CHRONIC PAIN SYNDROME: ICD-10-CM

## 2022-04-25 DIAGNOSIS — G89.29 CHRONIC LEFT-SIDED LOW BACK PAIN WITH LEFT-SIDED SCIATICA: ICD-10-CM

## 2022-04-25 DIAGNOSIS — M54.42 CHRONIC LEFT-SIDED LOW BACK PAIN WITH LEFT-SIDED SCIATICA: ICD-10-CM

## 2022-04-25 DIAGNOSIS — M25.551 PAIN IN RIGHT HIP: ICD-10-CM

## 2022-04-25 RX ORDER — GABAPENTIN 300 MG/1
300 CAPSULE ORAL 3 TIMES DAILY
Qty: 90 CAPSULE | Refills: 2 | Status: SHIPPED | OUTPATIENT
Start: 2022-04-25 | End: 2022-06-28 | Stop reason: SDUPTHER

## 2022-04-25 RX ORDER — GABAPENTIN 600 MG/1
600 TABLET ORAL 3 TIMES DAILY
Qty: 90 TABLET | Refills: 2 | Status: SHIPPED | OUTPATIENT
Start: 2022-04-25 | End: 2022-06-28 | Stop reason: SDUPTHER

## 2022-04-25 RX ORDER — BACLOFEN 20 MG/1
20 TABLET ORAL 2 TIMES DAILY PRN
Qty: 60 TABLET | Refills: 1 | Status: SHIPPED | OUTPATIENT
Start: 2022-04-25 | End: 2022-06-28 | Stop reason: SDUPTHER

## 2022-04-28 NOTE — TELEPHONE ENCOUNTER
S/w pt  Per pt " I can normally deal with my back pain but I am having a flare up now " Pt is requesting to try a steroid dose pack  Per pt this has really helped her in the past for this type of flare up and it has been months maybe last year since she had a dose pack ordered  Pt is currently taking Gabapentin 900 mg tid, Cymbalta 30 mg daily and Baclofen 20 mg bid with minimal relief  Pt has also tried ice/heat and topical pain relieving creams  Please advise- Is it possible for pt to have a script for a steroid dose pack?

## 2022-04-28 NOTE — TELEPHONE ENCOUNTER
Patient is in extreme pain, 10/10 crying on the phone,  states no one has called her back regarding SCS trial, she does know what to do she wants a nurse to call her back please       Please advise    Thank you

## 2022-04-28 NOTE — TELEPHONE ENCOUNTER
I spoke to pt re: SCS trial- she has not scheduled the psych eval, and MRI is scheduled on 5/23  Pt is aware we need both reports first in order to proceed w scheduling/obtaining auth  I will have Creasie Bridge reach out to pt to schedule  RN- please call pt back, she was returning your call from earlier this afternoon re her pain  I apologized for the delay in getting back to her, I was out of the office

## 2022-04-29 ENCOUNTER — TELEPHONE (OUTPATIENT)
Dept: PAIN MEDICINE | Facility: CLINIC | Age: 57
End: 2022-04-29

## 2022-04-29 DIAGNOSIS — M51.16 LUMBAR DISC DISEASE WITH RADICULOPATHY: Primary | ICD-10-CM

## 2022-04-29 RX ORDER — METHYLPREDNISOLONE 4 MG/1
TABLET ORAL
Qty: 1 EACH | Refills: 0 | Status: SHIPPED | OUTPATIENT
Start: 2022-04-29 | End: 2022-05-27 | Stop reason: ALTCHOICE

## 2022-04-29 NOTE — TELEPHONE ENCOUNTER
Received task from [2:20 PM] Flor Gannon  Urgent!   hey i have this pt who is in a lot of pain and stating the reason ppl kill themselves is bc they can't get help   acks 1     [2:22 PM] [place], [phone]      Spoke to pt regarding pain   Pt stated she has been calling and no one is doing anything about it  Writing nurse advised pt that [facility] is aware and will address   Writing nurse called [facility] to have addressed

## 2022-04-29 NOTE — TELEPHONE ENCOUNTER
Patient is crying calling for medication when are we sending to pharmacy?      [id number] [place] [address], [place] [address]   Phone:  [phone]  Fax:  [phone]   FRANK #:  [id number]    Pain is 10/10

## 2022-04-29 NOTE — TELEPHONE ENCOUNTER
Spoke to pt regarding comment   Pt denies plan of harming herself  Pt stated she is in a lot of pain

## 2022-04-29 NOTE — TELEPHONE ENCOUNTER
S/w pt  Per pt " I can normally deal with my back pain but I am having a flare up now " Pt is requesting to try a steroid dose pack  Per pt this has really helped her in the past for this type of flare up and it has been months maybe last year since she had a dose pack ordered  Pt is currently taking Gabapentin 900 mg tid, Cymbalta 30 mg daily and Baclofen 20 mg bid with minimal relief  Pt has also tried ice/heat and topical pain relieving creams      Please advise- Is it possible for pt to have a script for a steroid dose pack? April 29, 2022             12:05 PM  [facility] contacted [address]    12:06 PM  Note  Patient is crying calling for medication when are we sending to pharmacy?      [id number] [address], [address] [address]   Phone:  [phone]  Fax:  [phone]   FRANK #:  [id number]     Pain is 10/10               CH    12:08 PM  Lori Carmona routed this conversation to Blayne Solares, RN  to CHAYITO Solares         12:12 PM  Note  Please advise- Pt is requesting a dose pack for her current flare   Thank you

## 2022-05-02 NOTE — TELEPHONE ENCOUNTER
Called pt back, voicemail is full and unable to leave a message  (I had not called pt, talked to her last week but happy to speak with her if she calls back!)

## 2022-05-04 ENCOUNTER — SOCIAL WORK (OUTPATIENT)
Dept: BEHAVIORAL/MENTAL HEALTH CLINIC | Facility: CLINIC | Age: 57
End: 2022-05-04
Payer: COMMERCIAL

## 2022-05-04 DIAGNOSIS — F33.1 MODERATE EPISODE OF RECURRENT MAJOR DEPRESSIVE DISORDER (HCC): Primary | ICD-10-CM

## 2022-05-04 PROCEDURE — 90834 PSYTX W PT 45 MINUTES: CPT | Performed by: COUNSELOR

## 2022-05-04 NOTE — PSYCH
Psychotherapy Provided: Individual Psychotherapy 45 minutes     Length of time in session: 45 minutes, follow up in 2 week    Encounter Diagnosis     ICD-10-CM    1  Moderate episode of recurrent major depressive disorder ([facility] [address] )  F33 1        Goals addressed in session: Goal 1 and Goal 2     Pain:      moderate to severe back pain, chronic     8    Current suicide risk : Low     Data: Jonathan Forbes discussed her chronic pain and dealing with it  She reported that they finally approved her for ablation  She expressed hope  Person-Centered, DBT-based, positive psychology techniques were used to assist Pt's in her struggle and to develop a list of self-affirmations that would motivate her to act opposite of what her symptom directs her to  Assessment: Jonathan Forbes appeared calm and happy yet still in pain but with a great deal of strength to deal with it  She was open and talkative  She showed great deal of humor and self-humor despite her hardship  Plan; Jonathan Forbes agreed to spend more time outside when she feels better and to use positive self-affirmations first in the morning to set her mind and day, as well as Acting Opposite when pain is bearable  Behavioral Health Treatment Plan [facility]: Diagnosis and Treatment Plan explained to Arsenio Tom relates understanding diagnosis and is agreeable to Treatment Plan   Yes

## 2022-05-17 ENCOUNTER — DOCUMENTATION (OUTPATIENT)
Dept: BEHAVIORAL/MENTAL HEALTH CLINIC | Facility: CLINIC | Age: 57
End: 2022-05-17

## 2022-05-17 ENCOUNTER — SOCIAL WORK (OUTPATIENT)
Dept: BEHAVIORAL/MENTAL HEALTH CLINIC | Facility: CLINIC | Age: 57
End: 2022-05-17
Payer: COMMERCIAL

## 2022-05-17 DIAGNOSIS — F33.1 MODERATE EPISODE OF RECURRENT MAJOR DEPRESSIVE DISORDER (HCC): Primary | ICD-10-CM

## 2022-05-17 PROCEDURE — 90834 PSYTX W PT 45 MINUTES: CPT | Performed by: COUNSELOR

## 2022-05-17 NOTE — PROGRESS NOTES
After receiving a FDesk note that the contact phone number listed in the Pt's chart is incorrect, the Therapist attempted to call and confirm the appointment  An electric company answered the call  Mcdonald Sharla is still expected to come and will be inquired about her contact information

## 2022-05-17 NOTE — PSYCH
Psychotherapy Provided: Individual Psychotherapy 45 minutes     Length of time in session: 45 minutes, follow up in 2 week    Encounter Diagnosis     ICD-10-CM    1  Moderate episode of recurrent major depressive disorder ([facility] [address] )  F33 1        Goals addressed in session: Goal 1 and Goal 2     Pain:      moderate to severe    7    Current suicide risk : Low       Data: Janes Chirinos discussed her updated contact information and shared that the listed phone # had an error  She shared that she is scheduled to receive an "implant" besides her spine that is going to stimulate nerves throughout the body instead of just one place  She was unclear  Janes Chirinos denied having self-harm or suicidal thoughts  Person-Centered, CBT, and choice/control techniques were used to support Pt's initiated efforts to change her routine and build on it to improve her activity and the way it reflects on her mood and behavior  Assessment: Janes Chirinos looked and sounded a lot better with her pain and rated her pain 7  She stated that usually her pain is 10+ and feels unbearable  Plan: Janes Chirinos to follow through medical advice and have the procedure under a trial period of 10 days to see the effects  I was not radio frequency ablation, as she thought before, because ti would not help her body but only her back  The stimulator would affect the fybromialga as a whole, they expect  She agrees to continue with physical activity and outdoor, sun exposure  Behavioral Health Treatment Plan [facility]: Diagnosis and Treatment Plan explained to Willow Leon relates understanding diagnosis and is agreeable to Treatment Plan   Yes

## 2022-05-18 ENCOUNTER — TELEPHONE (OUTPATIENT)
Dept: OTHER | Facility: OTHER | Age: 57
End: 2022-05-18

## 2022-05-18 NOTE — TELEPHONE ENCOUNTER
Henrietta Lemons (Self) [phone] (H)     Is calling to cancel her appointment and will call back to reschedule            Name: Henrietta Lemons MRN: [id number]   Date: 5/18/2022 Status: Matthew   Time: 1:30 PM Length: 30   Visit Type: FOLLOW UP PG [[id number]] Copay: $0 00   Provider: Zuhair Michaud PA-C Department: [facility]

## 2022-05-23 ENCOUNTER — HOSPITAL ENCOUNTER (OUTPATIENT)
Dept: MRI IMAGING | Facility: HOSPITAL | Age: 57
Discharge: HOME | End: 2022-05-23
Attending: PHYSICAL MEDICINE & REHABILITATION
Payer: COMMERCIAL

## 2022-05-23 DIAGNOSIS — M46.1 SACROILIITIS (HCC): ICD-10-CM

## 2022-05-23 DIAGNOSIS — M70.61 TROCHANTERIC BURSITIS OF BOTH HIPS: ICD-10-CM

## 2022-05-23 DIAGNOSIS — G57.01 PIRIFORMIS SYNDROME OF RIGHT SIDE: ICD-10-CM

## 2022-05-23 DIAGNOSIS — G89.29 CHRONIC LEFT-SIDED LOW BACK PAIN WITH LEFT-SIDED SCIATICA: ICD-10-CM

## 2022-05-23 DIAGNOSIS — M70.62 TROCHANTERIC BURSITIS OF BOTH HIPS: ICD-10-CM

## 2022-05-23 DIAGNOSIS — M48.061 LUMBAR FORAMINAL STENOSIS: ICD-10-CM

## 2022-05-23 DIAGNOSIS — M54.42 CHRONIC LEFT-SIDED LOW BACK PAIN WITH LEFT-SIDED SCIATICA: ICD-10-CM

## 2022-05-23 DIAGNOSIS — M47.816 LUMBAR FACET ARTHROPATHY: ICD-10-CM

## 2022-05-23 DIAGNOSIS — G89.4 CHRONIC PAIN SYNDROME: ICD-10-CM

## 2022-05-23 PROCEDURE — 72146 MRI CHEST SPINE W/O DYE: CPT

## 2022-05-23 PROCEDURE — G1004 CDSM NDSC: HCPCS

## 2022-05-24 ENCOUNTER — OFFICE VISIT (OUTPATIENT)
Dept: PSYCHIATRY | Facility: CLINIC | Age: 57
End: 2022-05-24

## 2022-05-24 ENCOUNTER — TELEPHONE (OUTPATIENT)
Dept: PSYCHIATRY | Facility: CLINIC | Age: 57
End: 2022-05-24

## 2022-05-24 DIAGNOSIS — M79.7 FIBROMYALGIA: ICD-10-CM

## 2022-05-24 RX ORDER — DOXEPIN HYDROCHLORIDE 100 MG/1
100 CAPSULE ORAL
Qty: 30 CAPSULE | Refills: 1 | Status: SHIPPED | OUTPATIENT
Start: 2022-05-24 | End: 2022-06-20 | Stop reason: ALTCHOICE

## 2022-05-24 NOTE — TELEPHONE ENCOUNTER
Pt called in regarding appt today did not know which location it was it   Confirmed with pt that it was in [facility] at 1:45pm
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
## 2022-05-31 ENCOUNTER — SOCIAL WORK (OUTPATIENT)
Dept: BEHAVIORAL/MENTAL HEALTH CLINIC | Facility: CLINIC | Age: 57
End: 2022-05-31
Payer: COMMERCIAL

## 2022-05-31 ENCOUNTER — OFFICE VISIT (OUTPATIENT)
Dept: FAMILY MEDICINE CLINIC | Facility: CLINIC | Age: 57
End: 2022-05-31
Payer: COMMERCIAL

## 2022-05-31 ENCOUNTER — TELEPHONE (OUTPATIENT)
Dept: PAIN MEDICINE | Facility: CLINIC | Age: 57
End: 2022-05-31

## 2022-05-31 VITALS
HEART RATE: 80 BPM | OXYGEN SATURATION: 97 % | DIASTOLIC BLOOD PRESSURE: 80 MMHG | SYSTOLIC BLOOD PRESSURE: 130 MMHG | HEIGHT: 61 IN | RESPIRATION RATE: 16 BRPM | BODY MASS INDEX: 36.28 KG/M2

## 2022-05-31 DIAGNOSIS — Z98.84 H/O GASTRIC BYPASS: ICD-10-CM

## 2022-05-31 DIAGNOSIS — F33.1 MODERATE EPISODE OF RECURRENT MAJOR DEPRESSIVE DISORDER (HCC): Primary | ICD-10-CM

## 2022-05-31 DIAGNOSIS — E53.8 B12 DEFICIENCY: ICD-10-CM

## 2022-05-31 DIAGNOSIS — E61.1 IRON DEFICIENCY: ICD-10-CM

## 2022-05-31 DIAGNOSIS — F41.9 ANXIETY: ICD-10-CM

## 2022-05-31 DIAGNOSIS — F33.1 MODERATE EPISODE OF RECURRENT MAJOR DEPRESSIVE DISORDER (HCC): ICD-10-CM

## 2022-05-31 DIAGNOSIS — D36.13: Primary | ICD-10-CM

## 2022-05-31 DIAGNOSIS — M48.061 LUMBAR FORAMINAL STENOSIS: ICD-10-CM

## 2022-05-31 PROCEDURE — 99214 OFFICE O/P EST MOD 30 MIN: CPT | Performed by: FAMILY MEDICINE

## 2022-05-31 PROCEDURE — 90834 PSYTX W PT 45 MINUTES: CPT | Performed by: COUNSELOR

## 2022-05-31 RX ORDER — DOXEPIN HYDROCHLORIDE 150 MG/1
CAPSULE ORAL
COMMUNITY
Start: 2022-05-27 | End: 2022-06-28 | Stop reason: SDUPTHER

## 2022-05-31 NOTE — PROGRESS NOTES
Assessment/Plan:    reviewed the MRI and the procedure for the stim placement     Cont with meds and PM   Cont with infusioins / injections per heme   And psych meds even though she doesn't like them     Ill fill out the handicap form       1  Neuroma of ankle  Comments:  suspected - could also be ganglion cyst   sending to podatry for further eval   Orders:  -     Ambulatory Referral to Podiatry; Future  -     XR foot 3+ vw right; Future; Expected date: 05/31/2022    2  Lumbar foraminal stenosis    3  B12 deficiency    4  Iron deficiency    5  H/O gastric bypass    6  Moderate episode of recurrent major depressive disorder (HCC)       Subjective:      Patient ID: Zoila  is a 62 y o  female  HPI     Here to go over chronic issues and labs / imaging studies if applicable  Seeing heme  Iron def   b12 def       Seeing PM -   Xray with BL - mild hip OA   MRI thoracic -Active facet arthropathy with edema on both sides of the left facet joint is noted at T8-T9  Moderate left foraminal narrowing is present at this level  There is a minimal bulge at T12-L1  There is facet arthrosis with ligamentum flavum thickening  Right achilles with lump back there   And ankle edema   48 right 46 left   Is on 900 tid   Taking baclofen 20mg  Bid - thigh muscle spasm still getting them but not daily     Psych: Insomnia - on doxepin 100 from 150   Is no cymbalta 30mg - feels brain fog and decreased concentration   Thought she had ocd - she was caught cleaning a window sill     HTN: on valsartan - controlled   The following portions of the patient's history were reviewed and updated as appropriate: allergies, current medications, past family history, past medical history, past social history, past surgical history and problem list     Review of Systems   Constitutional: Negative for fever and unexpected weight change  HENT: Negative for nosebleeds and trouble swallowing  Eyes: Negative for visual disturbance  Respiratory: Negative for chest tightness and shortness of breath  Cardiovascular: Negative for chest pain, palpitations and leg swelling  Gastrointestinal: Negative for abdominal pain, constipation, diarrhea and nausea  Endocrine: Negative for cold intolerance  Genitourinary: Negative for dysuria and urgency  Musculoskeletal: Positive for arthralgias, back pain and gait problem  Negative for joint swelling and myalgias  Skin: Negative for rash  Neurological: Negative for tremors, seizures and syncope  Hematological: Does not bruise/bleed easily  Psychiatric/Behavioral: Positive for behavioral problems, dysphoric mood and sleep disturbance  Negative for hallucinations and suicidal ideas  The patient is nervous/anxious  Objective:      /80 (BP Location: Left arm, Patient Position: Sitting, Cuff Size: Large)   Pulse 80   Resp 16   Ht 5' 1" (1 549 m)   SpO2 97%   BMI 36 28 kg/m²     No visits with results within 2 Week(s) from this visit     Latest known visit with results is:   Appointment on 01/31/2022   Component Date Value    WBC 01/31/2022 5 92     RBC 01/31/2022 4 17     Hemoglobin 01/31/2022 13 3     Hematocrit 01/31/2022 40 0     MCV 01/31/2022 96     MCH 01/31/2022 31 9     MCHC 01/31/2022 33 3     RDW 01/31/2022 11 2 (A)    MPV 01/31/2022 9 8     Platelets 96/93/8796 225     nRBC 01/31/2022 0     Neutrophils Relative 01/31/2022 56     Immat GRANS % 01/31/2022 0     Lymphocytes Relative 01/31/2022 31     Monocytes Relative 01/31/2022 10     Eosinophils Relative 01/31/2022 2     Basophils Relative 01/31/2022 1     Neutrophils Absolute 01/31/2022 3 35     Immature Grans Absolute 01/31/2022 0 02     Lymphocytes Absolute 01/31/2022 1 82     Monocytes Absolute 01/31/2022 0 56     Eosinophils Absolute 01/31/2022 0 14     Basophils Absolute 01/31/2022 0 03     Vitamin B-12 01/31/2022 475     Cholesterol 01/31/2022 198     Triglycerides 01/31/2022 62  HDL, Direct 01/31/2022 98     LDL Calculated 01/31/2022 88     Iron Saturation 01/31/2022 35     TIBC 01/31/2022 293     Iron 01/31/2022 103     Ferritin 01/31/2022 162           Physical Exam  Vitals and nursing note reviewed  Constitutional:       General: She is in acute distress (cant sit down has to bend and move to stay comfortable )  Appearance: She is well-developed  HENT:      Head: Normocephalic and atraumatic  Cardiovascular:      Rate and Rhythm: Normal rate and regular rhythm  Heart sounds: Normal heart sounds  No murmur heard  Pulmonary:      Effort: Pulmonary effort is normal       Breath sounds: Normal breath sounds  No wheezing or rales  Abdominal:      General: Bowel sounds are normal  There is no distension  Palpations: Abdomen is soft  Tenderness: There is no abdominal tenderness  Musculoskeletal:         General: Tenderness present  Normal range of motion  Cervical back: Normal range of motion and neck supple  Lymphadenopathy:      Cervical: No cervical adenopathy  Skin:     General: Skin is warm and dry  Capillary Refill: Capillary refill takes less than 2 seconds  Findings: No rash  Neurological:      Mental Status: She is alert and oriented to person, place, and time  Cranial Nerves: No cranial nerve deficit  Sensory: No sensory deficit  Motor: No abnormal muscle tone  Gait: Gait abnormal    Psychiatric:         Mood and Affect: Mood is depressed  Affect is tearful  Behavior: Behavior normal          Thought Content: Thought content normal          Judgment: Judgment normal       Comments: Depressed and on the verge of tears          BMI Counseling: Body mass index is 36 28 kg/m²   The BMI is above normal  Nutrition recommendations include decreasing portion sizes, encouraging healthy choices of fruits and vegetables, decreasing fast food intake, consuming healthier snacks and limiting drinks that contain sugar  Exercise recommendations include exercising 3-5 times per week  No pharmacotherapy was ordered  Rationale for BMI follow-up plan is due to patient being overweight or obese           Berhane Guo MD  [place] [id number]

## 2022-05-31 NOTE — PSYCH
Psychotherapy Provided: Individual Psychotherapy 45 minutes     Length of time in session: 45 minutes, follow up in 2 week    Encounter Diagnosis     ICD-10-CM    1  Moderate episode of recurrent major depressive disorder (HCC)  F33 1    2  Anxiety  F41 9        Goals addressed in session: Goal 1 and Goal 2     Pain:      moderate to severe back pain    8    Current suicide risk : Low     Data: Alice Abraham was in pain though mobile and active  She was upset again about her son and about her not getting informed about her upcoming procedure with the spinal cord stimulator  Person-Centered, DBT- based, and CBT techniques were used to help Alice Abraham change her thoughts and feel better with emotions and behaviors as consequences  She felt better after focused on better thoughts and experiences- animals, people with positive attitudes, and sun exposure  Assessment: Alice Abraham was upset while thinking and talking about her pan and her son, while later, she was focused on other topics (hobbies) she was notably happier and calm, even pain was less- she was visibly more still and soothed  Plan: Alice bAraham agrees to continue with her hobbies- to make things out of pellets and spend time outside  She will use thought replacement to make her self-talk more positive and with better results  She will follow through with her pain doctor and will message the center that suppose to have her procedure  Alice Abraham was advised to follow through psychiatric recommendation as well  Behavioral Health Treatment Plan [facility]: Diagnosis and Treatment Plan explained to Larry Parada relates understanding diagnosis and is agreeable to Treatment Plan   Yes

## 2022-05-31 NOTE — TELEPHONE ENCOUNTER
Patient had gabapentin (NEURONTIN) 300 mg capsule     This was transferred to [facility] form but she did not want this she wants this transferred back as normal      Pharmacy is requesting New Script       Please advise    Thank you

## 2022-06-02 DIAGNOSIS — G89.29 CHRONIC LOW BACK PAIN WITH LEFT-SIDED SCIATICA, UNSPECIFIED BACK PAIN LATERALITY: ICD-10-CM

## 2022-06-02 DIAGNOSIS — M54.42 CHRONIC LOW BACK PAIN WITH LEFT-SIDED SCIATICA, UNSPECIFIED BACK PAIN LATERALITY: ICD-10-CM

## 2022-06-02 DIAGNOSIS — G89.4 CHRONIC PAIN SYNDROME: Primary | ICD-10-CM

## 2022-06-02 DIAGNOSIS — Z79.01 CHRONIC ANTICOAGULATION: ICD-10-CM

## 2022-06-02 DIAGNOSIS — Z79.899 ENCOUNTER FOR LONG-TERM (CURRENT) USE OF OTHER MEDICATIONS: ICD-10-CM

## 2022-06-03 ENCOUNTER — OFFICE VISIT (OUTPATIENT)
Dept: PAIN MEDICINE | Facility: CLINIC | Age: 57
End: 2022-06-03
Payer: COMMERCIAL

## 2022-06-03 VITALS
DIASTOLIC BLOOD PRESSURE: 84 MMHG | BODY MASS INDEX: 37.76 KG/M2 | RESPIRATION RATE: 18 BRPM | SYSTOLIC BLOOD PRESSURE: 139 MMHG | WEIGHT: 200 LBS | HEIGHT: 61 IN | HEART RATE: 80 BPM

## 2022-06-03 DIAGNOSIS — M48.062 SPINAL STENOSIS OF LUMBAR REGION WITH NEUROGENIC CLAUDICATION: ICD-10-CM

## 2022-06-03 DIAGNOSIS — G89.4 CHRONIC PAIN SYNDROME: Primary | ICD-10-CM

## 2022-06-03 DIAGNOSIS — M54.41 CHRONIC BILATERAL LOW BACK PAIN WITH BILATERAL SCIATICA: ICD-10-CM

## 2022-06-03 DIAGNOSIS — G89.29 CHRONIC BILATERAL LOW BACK PAIN WITH BILATERAL SCIATICA: ICD-10-CM

## 2022-06-03 DIAGNOSIS — M54.42 CHRONIC BILATERAL LOW BACK PAIN WITH BILATERAL SCIATICA: ICD-10-CM

## 2022-06-03 DIAGNOSIS — M48.061 LUMBAR FORAMINAL STENOSIS: ICD-10-CM

## 2022-06-03 PROCEDURE — 99214 OFFICE O/P EST MOD 30 MIN: CPT

## 2022-06-03 NOTE — PROGRESS NOTES
Assessment    1  Chronic pain syndrome     2  Chronic bilateral low back pain with bilateral sciatica     3  Lumbar foraminal stenosis     4  Spinal stenosis of lumbar region with neurogenic claudication         Plan  The spinal cord stimulator trial was discussed in depth with the patient  The patient was advised that a stimulator lead will be placed percutaneously through an epidural needle, with intra-op stimulation done to confirm appropriate lead placement  The lead will then be connected to an external generator and secured to the skin  The patient was advised that an industry clinical specialist will be present for the trial, program the stimulator and explain how to use it  During the trial, the patient was instructed to perform their activities of daily living, but limit twisting and bending motions, and no lifting greater than 10 pounds  The patient was advised to refrain from tub baths, showers, swimming, and hot tubs during the trial  The patient will return to the office for trial evaluation and lead removal on Friday, 06/24/2022  At that time, if the trial is successful, the patient will be referred to Neurosurgery for permanent spinal cord stimulator placement  Pre-procedure instructions were reviewed with the patient  The patient was given a prescription for blood work to be done by Monday, 06/13/2022  Complete risks and benefits including bleeding, infection, headache, tissue reaction, nerve injury and allergic reaction were discussed  The approach was demonstrated using models and literature was provided   The patient was advised that they would receive pre-procedure antibiotics and a prescription to take during the week of the trial     The patient was advised to hold NSAIDS starting 1 week prior to trial and for the duration of the trial    The patient will next follow- up on Monday, 06/20/2022 for the stimulator trial     No orders of the defined types were placed in this encounter  History of Present Illness  The patient is a 62 y o  female scheduled for an [employer] spinal cord stimulator trial to treat chronic pain from chronic bilateral low back pain with bilateral sciatica, lumbar spinal stenosis and lumbar radiculopathy  The current pain pattern includes bilateral low back pain that radiates down bilateral lower extremities, right worse than left  The patient's goals for the trial include the ability to walk longer distances, the ability to shop at the grocery store and  line for an extended period of time  Pain Assessment Measures   Numeric Rating Scale 8   Oswestry Disability Index Score/Neck Disability Index Score 70   PROMIS-29   - Physical Function 5   - Anxiety 15   - Depression 15   - Fatigue 16   - Sleep Disturbance 18   - Ability to Participate in Social Roles And Activities 4   - Pain Interference 20     I have personally reviewed and/or updated the patient's past medical history, past surgical history, family history, social history, current medications, allergies, and vital signs today  Review of Systems   Respiratory: Negative for shortness of breath  Cardiovascular: Negative for chest pain  Gastrointestinal: Negative for constipation, diarrhea, nausea and vomiting  Musculoskeletal: Positive for back pain, gait problem and joint swelling  Negative for arthralgias and myalgias  RLE Pain   Skin: Negative for rash  Neurological: Positive for weakness  Negative for dizziness and seizures  All other systems reviewed and are negative  Past Medical History:   Diagnosis Date    Anxiety     Fibromyalgia     Medical history unknown     Osteopenia        Past Surgical History:   Procedure Laterality Date    CARPAL TUNNEL RELEASE Bilateral     21 y o  with left ulnar nerve release        CARPAL TUNNEL RELEASE Bilateral      SECTION      ELBOW SURGERY      Tennis elbow    GASTRIC BYPASS      REDUCTION MAMMAPLASTY Bilateral     WISDOM TOOTH EXTRACTION         Family History   Problem Relation Age of Onset    Ovarian cancer Mother     Heart disease Mother     Colon cancer Father     No Known Problems Maternal Grandmother     No Known Problems Maternal Grandfather     Diabetes Paternal Grandmother     No Known Problems Paternal Grandfather     No Known Problems Son     Leukemia Maternal Uncle     Diabetes Paternal Aunt     Diabetes Paternal Uncle        Social History     Occupational History    Not on file   Tobacco Use    Smoking status: Never Smoker    Smokeless tobacco: Never Used   Vaping Use    Vaping Use: Never used   Substance and Sexual Activity    Alcohol use: No    Drug use: Never    Sexual activity: Not on file         Current Outpatient Medications:     ascorbic acid (VITAMIN C) 500 MG tablet, Take 500 mg by mouth daily, Disp: , Rfl:     baclofen 20 mg tablet, Take 1 tablet (20 mg total) by mouth 2 (two) times a day as needed for muscle spasms, Disp: 60 tablet, Rfl: 1    doxepin (SINEquan) 100 mg capsule, Take 1 capsule (100 mg total) by mouth daily at bedtime, Disp: 30 capsule, Rfl: 1    doxepin (SINEquan) 150 MG capsule, , Disp: , Rfl:     DULoxetine (Cymbalta) 30 mg delayed release capsule, Take 1 capsule (30 mg total) by mouth daily, Disp: 30 capsule, Rfl: 1    ergocalciferol (VITAMIN D2) 50,000 units, Take 1 capsule (50,000 Units total) by mouth every 28 days, Disp: 4 capsule, Rfl: 2    gabapentin (NEURONTIN) 300 mg capsule, Take 1 capsule (300 mg total) by mouth 3 (three) times a day In addition to 600 mg TID for a total of 900 mg TID, Disp: 90 capsule, Rfl: 2    gabapentin (Neurontin) 600 MG tablet, Take 1 tablet (600 mg total) by mouth 3 (three) times a day, Disp: 90 tablet, Rfl: 2    valsartan (DIOVAN) 160 mg tablet, TAKE 1 TABLET BY MOUTH EVERY DAY, Disp: 30 tablet, Rfl: 5    vitamin B-12 (VITAMIN B-12) 500 mcg tablet, TAKE 2 TABLETS (1,000 MCG TOTAL) BY MOUTH DAILY, Disp: 60 tablet, Rfl: 2    No Known Allergies    Physical Exam    /84   Pulse 80   Resp 18   Ht 5' 1" (1 549 m)   Wt 90 7 kg (200 lb)   BMI 37 79 kg/m²     Constitutional:normal, well developed, well nourished, alert, in no distress and non-toxic and no overt pain behavior  Eyes:anicteric  HEENT:grossly intact  Neck:supple, symmetric, trachea midline and no masses   Pulmonary:even and unlabored lungs clear in all lobes  Cardiovascular:No edema or pitting edema present heart rate regular  Skin:Normal without rashes or lesions and well hydrated  Psychiatric:Mood and affect appropriate  Neurologic:Cranial Nerves II-XII grossly intact  Musculoskeletal:normal   Gastrointestinal:  Bowel sounds present in all 4 quadrants    Imaging      MRI THORACIC SPINE WITHOUT CONTRAST     INDICATION: G89 4: Chronic pain syndrome  M54 42: Lumbago with sciatica, left side  G89 29: Other chronic pain  G57 01: Lesion of sciatic nerve, right lower limb  M46 1: Sacroiliitis, not elsewhere classified  M70 61: Trochanteric bursitis, right hip  M70 62: Trochanteric bursitis, left hip  M47 816: Spondylosis without myelopathy or radiculopathy, lumbar region  M48 061: Spinal stenosis, lumbar region withou      COMPARISON:  None      TECHNIQUE:  Sagittal T1, sagittal T2, sagittal inversion recovery, axial T2,  axial 2D MERGE      IMAGE QUALITY: Diagnostic      FINDINGS:     ALIGNMENT: There is scoliosis  There is no significant spondylolisthesis        MARROW SIGNAL:  There are scattered hemangiomas  There is no suspicious marrow signal abnormality      THORACIC CORD: Normal signal within the thoracic cord      PARAVERTEBRAL SOFT TISSUES:  There is a left-sided renal cyst   There is no significant prevertebral or paravertebral soft tissue swelling      THORACIC DEGENERATIVE CHANGE:      There is facet arthrosis with edema on both sides of the left facet joint at T8-T9  There is fluid in the left facet joint    There is moderate left foraminal narrowing  There is mild right foraminal narrowing  There is no significant canal stenosis      There is a trace bulge, asymmetric to the right at T10-T11  There is mild mass effect on the thecal sac  There is no significant foraminal narrowing      There is a minimal bulge at T12-L1  There is facet arthrosis with ligamentum flavum thickening      There is ventral osteophytosis at T9-T10      IMPRESSION:     Degenerative changes of the thoracic spine, as described above      Active facet arthropathy with edema on both sides of the left facet joint is noted at T8-T9    Moderate left foraminal narrowing is present at this level

## 2022-06-07 ENCOUNTER — APPOINTMENT (OUTPATIENT)
Dept: RADIOLOGY | Facility: CLINIC | Age: 57
End: 2022-06-07
Payer: COMMERCIAL

## 2022-06-07 ENCOUNTER — APPOINTMENT (OUTPATIENT)
Dept: LAB | Facility: CLINIC | Age: 57
End: 2022-06-07
Payer: COMMERCIAL

## 2022-06-07 DIAGNOSIS — Z79.01 CHRONIC ANTICOAGULATION: ICD-10-CM

## 2022-06-07 DIAGNOSIS — D36.13: ICD-10-CM

## 2022-06-07 DIAGNOSIS — M54.42 CHRONIC LOW BACK PAIN WITH LEFT-SIDED SCIATICA, UNSPECIFIED BACK PAIN LATERALITY: ICD-10-CM

## 2022-06-07 DIAGNOSIS — Z79.899 ENCOUNTER FOR LONG-TERM (CURRENT) USE OF OTHER MEDICATIONS: ICD-10-CM

## 2022-06-07 DIAGNOSIS — G89.4 CHRONIC PAIN SYNDROME: ICD-10-CM

## 2022-06-07 DIAGNOSIS — G89.29 CHRONIC LOW BACK PAIN WITH LEFT-SIDED SCIATICA, UNSPECIFIED BACK PAIN LATERALITY: ICD-10-CM

## 2022-06-07 LAB
ALBUMIN SERPL BCP-MCNC: 3.5 G/DL (ref 3.5–5)
ALP SERPL-CCNC: 66 U/L (ref 46–116)
ALT SERPL W P-5'-P-CCNC: 27 U/L (ref 12–78)
ANION GAP SERPL CALCULATED.3IONS-SCNC: 7 MMOL/L (ref 4–13)
APTT PPP: 35 SECONDS (ref 23–37)
AST SERPL W P-5'-P-CCNC: 18 U/L (ref 5–45)
BILIRUB SERPL-MCNC: 0.48 MG/DL (ref 0.2–1)
BUN SERPL-MCNC: 12 MG/DL (ref 5–25)
CALCIUM SERPL-MCNC: 8.9 MG/DL (ref 8.3–10.1)
CHLORIDE SERPL-SCNC: 106 MMOL/L (ref 100–108)
CO2 SERPL-SCNC: 26 MMOL/L (ref 21–32)
CREAT SERPL-MCNC: 0.93 MG/DL (ref 0.6–1.3)
ERYTHROCYTE [DISTWIDTH] IN BLOOD BY AUTOMATED COUNT: 11.2 % (ref 11.6–15.1)
EST. AVERAGE GLUCOSE BLD GHB EST-MCNC: 108 MG/DL
GFR SERPL CREATININE-BSD FRML MDRD: 68 ML/MIN/1.73SQ M
GLUCOSE SERPL-MCNC: 105 MG/DL (ref 65–140)
HBA1C MFR BLD: 5.4 %
HCT VFR BLD AUTO: 41.6 % (ref 34.8–46.1)
HGB BLD-MCNC: 13.4 G/DL (ref 11.5–15.4)
INR PPP: 1.01 (ref 0.84–1.19)
MCH RBC QN AUTO: 30.9 PG (ref 26.8–34.3)
MCHC RBC AUTO-ENTMCNC: 32.2 G/DL (ref 31.4–37.4)
MCV RBC AUTO: 96 FL (ref 82–98)
PLATELET # BLD AUTO: 151 THOUSANDS/UL (ref 149–390)
PMV BLD AUTO: 10.8 FL (ref 8.9–12.7)
POTASSIUM SERPL-SCNC: 3.8 MMOL/L (ref 3.5–5.3)
PROT SERPL-MCNC: 7.5 G/DL (ref 6.4–8.2)
PROTHROMBIN TIME: 12.9 SECONDS (ref 11.6–14.5)
RBC # BLD AUTO: 4.34 MILLION/UL (ref 3.81–5.12)
SODIUM SERPL-SCNC: 139 MMOL/L (ref 136–145)
WBC # BLD AUTO: 4.5 THOUSAND/UL (ref 4.31–10.16)

## 2022-06-07 PROCEDURE — 83036 HEMOGLOBIN GLYCOSYLATED A1C: CPT

## 2022-06-07 PROCEDURE — 36415 COLL VENOUS BLD VENIPUNCTURE: CPT

## 2022-06-07 PROCEDURE — 73630 X-RAY EXAM OF FOOT: CPT

## 2022-06-07 PROCEDURE — 85730 THROMBOPLASTIN TIME PARTIAL: CPT

## 2022-06-07 PROCEDURE — 85610 PROTHROMBIN TIME: CPT

## 2022-06-07 PROCEDURE — 80053 COMPREHEN METABOLIC PANEL: CPT

## 2022-06-07 PROCEDURE — 85027 COMPLETE CBC AUTOMATED: CPT

## 2022-06-08 ENCOUNTER — TRANSCRIBE ORDERS (OUTPATIENT)
Dept: PAIN MEDICINE | Facility: CLINIC | Age: 57
End: 2022-06-08

## 2022-06-15 ENCOUNTER — TELEPHONE (OUTPATIENT)
Dept: PSYCHIATRY | Facility: CLINIC | Age: 57
End: 2022-06-15

## 2022-06-15 ENCOUNTER — OFFICE VISIT (OUTPATIENT)
Dept: FAMILY MEDICINE CLINIC | Facility: CLINIC | Age: 57
End: 2022-06-15
Payer: COMMERCIAL

## 2022-06-15 VITALS
SYSTOLIC BLOOD PRESSURE: 136 MMHG | OXYGEN SATURATION: 98 % | HEART RATE: 82 BPM | WEIGHT: 200 LBS | HEIGHT: 61 IN | BODY MASS INDEX: 37.76 KG/M2 | RESPIRATION RATE: 16 BRPM | DIASTOLIC BLOOD PRESSURE: 88 MMHG

## 2022-06-15 DIAGNOSIS — L23.7 POISON IVY DERMATITIS: Primary | ICD-10-CM

## 2022-06-15 PROCEDURE — 99213 OFFICE O/P EST LOW 20 MIN: CPT | Performed by: FAMILY MEDICINE

## 2022-06-15 RX ORDER — METHYLPREDNISOLONE 4 MG/1
TABLET ORAL
Qty: 21 EACH | Refills: 0 | Status: CANCELLED | OUTPATIENT
Start: 2022-06-15

## 2022-06-15 RX ORDER — PREDNISONE 10 MG/1
TABLET ORAL
Qty: 43 TABLET | Refills: 0 | Status: SHIPPED | OUTPATIENT
Start: 2022-06-15 | End: 2022-06-20 | Stop reason: ALTCHOICE

## 2022-06-15 RX ORDER — PREDNISONE 10 MG/1
TABLET ORAL
Qty: 37 TABLET | Refills: 0 | Status: SHIPPED | OUTPATIENT
Start: 2022-06-15 | End: 2022-06-15

## 2022-06-15 NOTE — TELEPHONE ENCOUNTER
LVM  Per Dr Herman Files, called the patient to see if she wanted to r/s her missed appt today during the month of June

## 2022-06-15 NOTE — PROGRESS NOTES
Assessment/Plan:   Diagnoses and all orders for this visit:    Poison ivy dermatitis  -     predniSONE 10 mg tablet; Take 60mg QD x2days, take 50mg QD x2days, take 40mg QD x2days, take 30mg QD x2days, take 20mg QD x2days, take 10mg QD x2days, take 5mg QD x2days and then off    Other orders  -     Diclofenac Sodium (VOLTAREN) 1 %; APPLY 2 GRAMS TO THE AFFECTED AREA(S) BY TOPICAL ROUTE 4 TIMES PER DAY          Subjective:    Patient ID: Carissa Escudero is a 62 y o  female  HPI   - was out for a walk yesterday   - woke up diffuse pruritic rash all over her body   - allergic to Calamine lotion   - denies F/C/N/V/CP/palpitations/SOB/wheezing/abd pain      The following portions of the patient's history were reviewed and updated as appropriate: allergies, current medications, past family history, past medical history, past social history, past surgical history and problem list     Review of Systems  as per HPI    Objective:  /88   Pulse 82   Resp 16   Ht 5' 1" (1 549 m)   Wt 90 7 kg (200 lb)   SpO2 98%   BMI 37 79 kg/m²    Physical Exam  Vitals reviewed  Constitutional:       General: She is not in acute distress  Appearance: She is obese  She is not ill-appearing, toxic-appearing or diaphoretic  HENT:      Head: Normocephalic and atraumatic  Right Ear: External ear normal       Left Ear: External ear normal       Nose: Nose normal    Eyes:      General: No scleral icterus  Right eye: No discharge  Left eye: No discharge  Extraocular Movements: Extraocular movements intact  Conjunctiva/sclera: Conjunctivae normal    Pulmonary:      Effort: Pulmonary effort is normal  No respiratory distress  Musculoskeletal:         General: Normal range of motion  Skin:     Findings: Rash present  Comments: Diffuse pruritic rash    Neurological:      General: No focal deficit present  Mental Status: She is alert and oriented to person, place, and time     Psychiatric: Mood and Affect: Mood normal          Behavior: Behavior normal          BMI Counseling: Body mass index is 37 79 kg/m²  The BMI is above normal  Nutrition recommendations include 3-5 servings of fruits/vegetables daily  Exercise recommendations include exercising 3-5 times per week

## 2022-06-17 ENCOUNTER — DOCUMENTATION (OUTPATIENT)
Dept: BEHAVIORAL/MENTAL HEALTH CLINIC | Facility: CLINIC | Age: 57
End: 2022-06-17

## 2022-06-17 NOTE — PROGRESS NOTES
Pt was given calls multiple times to ask about her pain and her availability to attend her session  Her mailbox was full  Therapist left multiple voicemail messages the last couple of days trying to check in with her after she reported that she was in severe pain

## 2022-06-20 ENCOUNTER — HOSPITAL ENCOUNTER (OUTPATIENT)
Dept: RADIOLOGY | Facility: CLINIC | Age: 57
Discharge: HOME | End: 2022-06-20
Attending: PHYSICAL MEDICINE & REHABILITATION | Admitting: PHYSICAL MEDICINE & REHABILITATION
Payer: COMMERCIAL

## 2022-06-20 ENCOUNTER — TELEPHONE (OUTPATIENT)
Dept: RADIOLOGY | Facility: CLINIC | Age: 57
End: 2022-06-20

## 2022-06-20 ENCOUNTER — HOSPITAL ENCOUNTER (OUTPATIENT)
Dept: RADIOLOGY | Facility: HOSPITAL | Age: 57
Discharge: HOME | End: 2022-06-20
Attending: PHYSICAL MEDICINE & REHABILITATION
Payer: COMMERCIAL

## 2022-06-20 VITALS
RESPIRATION RATE: 18 BRPM | HEART RATE: 72 BPM | DIASTOLIC BLOOD PRESSURE: 89 MMHG | TEMPERATURE: 98.1 F | OXYGEN SATURATION: 98 % | SYSTOLIC BLOOD PRESSURE: 146 MMHG

## 2022-06-20 DIAGNOSIS — M54.42 CHRONIC MIDLINE LOW BACK PAIN WITH LEFT-SIDED SCIATICA: ICD-10-CM

## 2022-06-20 DIAGNOSIS — Z96.89 SPINAL CORD STIMULATOR STATUS: ICD-10-CM

## 2022-06-20 DIAGNOSIS — M48.061 LUMBAR FORAMINAL STENOSIS: ICD-10-CM

## 2022-06-20 DIAGNOSIS — G89.29 CHRONIC MIDLINE LOW BACK PAIN WITH LEFT-SIDED SCIATICA: ICD-10-CM

## 2022-06-20 DIAGNOSIS — M47.816 LUMBAR FACET ARTHROPATHY: ICD-10-CM

## 2022-06-20 DIAGNOSIS — M46.1 SACROILIITIS (HCC): ICD-10-CM

## 2022-06-20 DIAGNOSIS — G89.29 CHRONIC LOW BACK PAIN WITH LEFT-SIDED SCIATICA, UNSPECIFIED BACK PAIN LATERALITY: ICD-10-CM

## 2022-06-20 DIAGNOSIS — G89.4 CHRONIC PAIN SYNDROME: ICD-10-CM

## 2022-06-20 DIAGNOSIS — Z96.89 SPINAL CORD STIMULATOR STATUS: Primary | ICD-10-CM

## 2022-06-20 DIAGNOSIS — M54.42 CHRONIC LOW BACK PAIN WITH LEFT-SIDED SCIATICA, UNSPECIFIED BACK PAIN LATERALITY: ICD-10-CM

## 2022-06-20 PROCEDURE — C1897 LEAD, NEUROSTIM TEST KIT: HCPCS

## 2022-06-20 PROCEDURE — 72070 X-RAY EXAM THORAC SPINE 2VWS: CPT

## 2022-06-20 PROCEDURE — 96374 THER/PROPH/DIAG INJ IV PUSH: CPT

## 2022-06-20 PROCEDURE — 63650 IMPLANT NEUROELECTRODES: CPT | Performed by: PHYSICAL MEDICINE & REHABILITATION

## 2022-06-20 RX ORDER — CEPHALEXIN 500 MG/1
500 CAPSULE ORAL EVERY 12 HOURS SCHEDULED
Qty: 14 CAPSULE | Refills: 0 | Status: SHIPPED | OUTPATIENT
Start: 2022-06-20 | End: 2022-06-27

## 2022-06-20 RX ORDER — LIDOCAINE HYDROCHLORIDE 10 MG/ML
10 INJECTION, SOLUTION EPIDURAL; INFILTRATION; INTRACAUDAL; PERINEURAL ONCE
Status: COMPLETED | OUTPATIENT
Start: 2022-06-20 | End: 2022-06-20

## 2022-06-20 RX ORDER — CEFAZOLIN SODIUM 2 G/50ML
2000 SOLUTION INTRAVENOUS ONCE
Status: COMPLETED | OUTPATIENT
Start: 2022-06-20 | End: 2022-06-20

## 2022-06-20 RX ADMIN — LIDOCAINE HYDROCHLORIDE 6 ML: 10 INJECTION, SOLUTION EPIDURAL; INFILTRATION; INTRACAUDAL; PERINEURAL at 15:23

## 2022-06-20 RX ADMIN — CEFAZOLIN SODIUM 2000 MG: 2 SOLUTION INTRAVENOUS at 14:20

## 2022-06-20 NOTE — H&P
History of Present Illness: The patient is a 62 y o  female who presents with complaints of low back pain    Patient Active Problem List   Diagnosis    Colon polyp    Internal hemorrhoids    Family history of colorectal cancer    Vitamin D deficiency    Iron deficiency    H/O gastric bypass    B12 deficiency    Moderate episode of recurrent major depressive disorder (HCC)    Fibromyalgia    Chronic left-sided low back pain with left-sided sciatica    Bilateral hip pain    Obesity, Class II, BMI 35-39 9    Encounter for surgical aftercare following surgery of digestive system    Postsurgical malabsorption    Constipation    Bariatric surgery status    Sacroiliitis (HCC)    Piriformis syndrome of left side    Piriformis syndrome of right side    Trochanteric bursitis of both hips    Lumbar facet arthropathy    Osteopenia    Anxiety    Lumbar foraminal stenosis    Spinal stenosis of lumbar region with neurogenic claudication    Chronic pain syndrome    Spasm of right piriformis muscle       Past Medical History:   Diagnosis Date    Anxiety     Fibromyalgia     Medical history unknown     Osteopenia        Past Surgical History:   Procedure Laterality Date    CARPAL TUNNEL RELEASE Bilateral     21 y o  with left ulnar nerve release        CARPAL TUNNEL RELEASE Bilateral      SECTION      ELBOW SURGERY      Tennis elbow    GASTRIC BYPASS      REDUCTION MAMMAPLASTY Bilateral     WISDOM TOOTH EXTRACTION           Current Outpatient Medications:     ascorbic acid (VITAMIN C) 500 MG tablet, Take 500 mg by mouth daily, Disp: , Rfl:     baclofen 20 mg tablet, Take 1 tablet (20 mg total) by mouth 2 (two) times a day as needed for muscle spasms, Disp: 60 tablet, Rfl: 1    Diclofenac Sodium (VOLTAREN) 1 %, APPLY 2 GRAMS TO THE AFFECTED AREA(S) BY TOPICAL ROUTE 4 TIMES PER DAY, Disp: , Rfl:     doxepin (SINEquan) 100 mg capsule, Take 1 capsule (100 mg total) by mouth daily at bedtime, Disp: 30 capsule, Rfl: 1    doxepin (SINEquan) 150 MG capsule, , Disp: , Rfl:     DULoxetine (CYMBALTA) 30 mg delayed release capsule, TAKE 1 CAPSULE BY MOUTH EVERY DAY, Disp: 90 capsule, Rfl: 0    ergocalciferol (VITAMIN D2) 50,000 units, Take 1 capsule (50,000 Units total) by mouth every 28 days, Disp: 4 capsule, Rfl: 2    gabapentin (NEURONTIN) 300 mg capsule, Take 1 capsule (300 mg total) by mouth 3 (three) times a day In addition to 600 mg TID for a total of 900 mg TID, Disp: 90 capsule, Rfl: 2    gabapentin (Neurontin) 600 MG tablet, Take 1 tablet (600 mg total) by mouth 3 (three) times a day, Disp: 90 tablet, Rfl: 2    predniSONE 10 mg tablet, Take 60mg QD x2days, take 50mg QD x2days, take 40mg QD x2days, take 30mg QD x2days, take 20mg QD x2days, take 10mg QD x2days, take 5mg QD x2days and then off, Disp: 43 tablet, Rfl: 0    valsartan (DIOVAN) 160 mg tablet, TAKE 1 TABLET BY MOUTH EVERY DAY, Disp: 30 tablet, Rfl: 5    vitamin B-12 (VITAMIN B-12) 500 mcg tablet, TAKE 2 TABLETS (1,000 MCG TOTAL) BY MOUTH DAILY, Disp: 60 tablet, Rfl: 2    Current Facility-Administered Medications:     ceFAZolin (ANCEF) IVPB (premix in dextrose) 2,000 mg 50 mL, 2,000 mg, Intravenous, Once, Ector Horan DO    lidocaine (PF) (XYLOCAINE-MPF) 1 % injection 10 mL, 10 mL, Infiltration, Once, Ector Horan DO    No Known Allergies    Physical Exam: There were no vitals filed for this visit  General: Awake, Alert, Oriented x 3, Mood and affect appropriate  Respiratory: Respirations even and unlabored  Cardiovascular: Peripheral pulses intact; no edema  Musculoskeletal Exam:  Tenderness to palpation bilateral lumbar paraspinals    ASA Score: 2       Assessment:   1  Chronic pain syndrome    2   Chronic low back pain with left-sided sciatica, unspecified back pain laterality        [facility]

## 2022-06-20 NOTE — TELEPHONE ENCOUNTER
Call 6/212 s/p patti SCS trial with KW at [facility] removal 6/24 arrive at 2pm

## 2022-06-20 NOTE — DISCHARGE INSTR - LAB
SPINE AND PAIN: SPINAL CORD STIMULATION/PERIPHERAL NERVE STIMULATION TRIAL/ PERMANENT IMPLANT DISCHARGE INSTRUCTIONS    ACTIVITY RESTRICTIONS DURING TRIAL PERIOD  Do not drive with the SCS "on"  Do not bend or twist at the waist   Do not lift anything that weighs over 5 pounds  When you lie down, "log roll" (keep spine aligned) to change positions  Do not sleep on your stomach  Limit stair climbing and strenuous activity  CARE OF THE INJECTION SITE  CHECK THE DRESSING DAILY  It should intact  Notify the [facility] if you have any of the following: redness, drainage, swelling, chills or fever over 100°F   Do not change dressing, only reinforce edges if necessary  Keep the dressing dry  Do not shower, (sponge baths only) until evaluated in office  SPECIAL INSTRUCTIONS  Take your temperature daily  Follow-up for lead removal scheduled for 6/24/22 with Pat Cueva, please arrive at 2 pm  ***  The  box is expensive  DO NOT drop or get wet  Do not pull on the cable or leads  Do not disconnect the cable and lead  Do activities that normally cause you to have pain and try different  settings  Obtain post procedure x-ray shortly after procedure if ordered by physician  MEDICATIONS  Continue to take all routine medications  Our office may have instructed you to hold some medications  , do not take any aspirin containing products, advil, aleve, motrin or ibuprofen during trial but you may take tylenol  Take antiobiotic as prescribed: it will be sent to your pharmacy by Dr Mark Cancino  If you have any problems specifically related to your procedure, please call our office at [phone]  Problems not related to your procedure should be directed at your primary care physician  Contact the company representative with any questions regarding settings or operation of the external  box      As no general anesthesia was used in today's procedure, you should not experience any side effects related to anesthesia

## 2022-06-22 ENCOUNTER — SOCIAL WORK (OUTPATIENT)
Dept: BEHAVIORAL/MENTAL HEALTH CLINIC | Facility: CLINIC | Age: 57
End: 2022-06-22
Payer: COMMERCIAL

## 2022-06-22 DIAGNOSIS — F33.1 MODERATE EPISODE OF RECURRENT MAJOR DEPRESSIVE DISORDER (HCC): Primary | ICD-10-CM

## 2022-06-22 DIAGNOSIS — F41.9 ANXIETY: ICD-10-CM

## 2022-06-22 PROCEDURE — 90834 PSYTX W PT 45 MINUTES: CPT | Performed by: COUNSELOR

## 2022-06-22 NOTE — PSYCH
Psychotherapy Provided: Individual Psychotherapy 45 minutes     Length of time in session: 45 minutes, follow up in 2 week    Encounter Diagnosis     ICD-10-CM    1  Moderate episode of recurrent major depressive disorder (HCC)  F33 1    2  Anxiety  F41 9        Goals addressed in session: Goal 1 and Goal 2     Pain:      Much better regulated with Spinal cord stimulator trial     5    Current suicide risk : Low     Data: Zack came to revise and renew her [address] and to share how the procedure for her spinal cord stimulator went  She signed the plan first and then returned to her discussion about how she feels after the procedure  Karen reported feeling better, sleeping better (quantity and quality), walking better, has willingness and energy to go out and increase outdoor activity and exercise  Person-Centered and CBT techniques were combined to help Pt's work on strengthening her new self-talk and sticking to her promise about self-care and outdoor activity  Assessment: Zack was notably happier, calmer, satisfied, relieved, and talkative  She was visibly energetic and willing to work on increasing outdoor and physical activity  Plan: Zack wants to continue with less sessions, once a month due to feeling so much better and then transition to another therapist due to having Medicare in Oct      [address]: Diagnosis and Treatment Plan explained to Zina Morrows relates understanding diagnosis and is agreeable to Treatment Plan   Yes
No

## 2022-06-22 NOTE — BH TREATMENT PLAN
Genevieve Haile  1965          Date of Initial Treatment Plan: 12/30/2021   Date of Current Treatment Plan: 6/30/2022     Treatment Plan Number 2      Strengths/Personal Resources for Self Care: Alice Abraham is going through a period of pain management after a spinal cord stimulator and has a big relief of her severe pain  She reports sleeping much better, woke up just once and is able to have walks and more activities  Alice Abraham is determined to change her way of giving in to her negative thoughts; she has a couple of good friends that support her and is spiritual        Diagnosis:   Major Depressive Episode, Recurrent, Moderate HCC       Area of Needs: Mood        Long Term Goal 1: Decrease Depression to improve daily functioning, rate with PHQ9 monthly for evidence of symptom change       Target Date: Dec 30, 202  Completion Date: TBD         Short Term Objectives for Goal 1: Learn more DBT and CBT skills to counter depression along with current medication management, responsible for which is Hiral, Psychiatrist, and Therapist           Short Term Objectives for Goal 1: Increase physical activity and outdoor time since she has the spinal cord stimulator that distributes pain medication better       Long Term Goal 2: NA     Target Date: N/A  Completion Date: N/A     Short Term Objectives for Goal 2: N/A          Long Term Goal # 3: N/A      Target Date: N/A  Completion Date: N/A     Short Term Objectives for Goal 3: N/A     GOAL 1: Modality: Individual 1x per month   Completion TBD     GOAL 2: Modality: NA      GOAL 3: Modality:NA         Behavioral Health Treatment Plan [facility]: Diagnosis and Treatment Plan explained to Orval Tal relates understanding diagnosis and is agreeable to Treatment Plan          Client Comments : Please share your thoughts, feelings, need and/or experiences regarding your treatment plan: Alice Abraham is willing to see her therapist monthly to improve her mood and behavior  She is going to learn more skills to increase socialization, acceptance of what she cannot change or avoid, animal-assisted activities, and thought replacement  Iona Stalin signed the plan and returned to her discussion about her plans for the summer

## 2022-06-23 ENCOUNTER — TELEPHONE (OUTPATIENT)
Dept: PSYCHIATRY | Facility: CLINIC | Age: 57
End: 2022-06-23

## 2022-06-24 ENCOUNTER — TELEPHONE (OUTPATIENT)
Dept: PAIN MEDICINE | Facility: MEDICAL CENTER | Age: 57
End: 2022-06-24

## 2022-06-24 ENCOUNTER — OFFICE VISIT (OUTPATIENT)
Dept: PAIN MEDICINE | Facility: CLINIC | Age: 57
End: 2022-06-24
Payer: COMMERCIAL

## 2022-06-24 VITALS
HEART RATE: 68 BPM | DIASTOLIC BLOOD PRESSURE: 84 MMHG | RESPIRATION RATE: 18 BRPM | SYSTOLIC BLOOD PRESSURE: 139 MMHG | BODY MASS INDEX: 36.82 KG/M2 | WEIGHT: 195 LBS | HEIGHT: 61 IN

## 2022-06-24 DIAGNOSIS — M54.16 LUMBAR RADICULOPATHY: ICD-10-CM

## 2022-06-24 DIAGNOSIS — M48.062 SPINAL STENOSIS OF LUMBAR REGION WITH NEUROGENIC CLAUDICATION: ICD-10-CM

## 2022-06-24 DIAGNOSIS — G89.4 CHRONIC PAIN SYNDROME: Primary | ICD-10-CM

## 2022-06-24 DIAGNOSIS — M48.061 LUMBAR FORAMINAL STENOSIS: ICD-10-CM

## 2022-06-24 PROCEDURE — 99214 OFFICE O/P EST MOD 30 MIN: CPT

## 2022-06-24 NOTE — PROGRESS NOTES
Assessment  1  Chronic pain syndrome     2  Spinal stenosis of lumbar region with neurogenic claudication  Ambulatory referral to Neurosurgery   3  Lumbar foraminal stenosis     4  Lumbar radiculopathy         Plan  The patient had a successful Nevro spinal cord stimulator trial   The patient will be referred to Dr Tank Mas for permanent implantation  The patient will then follow back in our office 6 weeks after permanent implantation for re-evaluation  The patient was advised to complete their antibiotics course  Orders Placed This Encounter   Procedures    Ambulatory referral to Neurosurgery     Standing Status:   Future     Standing Expiration Date:   6/24/2023     Referral Priority:   Routine     Referral Type:   Consult - AMB     Referral Reason:   Specialty Services Required     Referred to Provider:   Eliza Rodriguez MD     Requested Specialty:   Neurosurgery     Number of Visits Requested:   1     Expiration Date:   6/24/2023         History of Present Illness    The patient is a 62 y o  female status post Nevro spinal cord stimulator percutaneous lead placement on 06/20/2022   The patient's reported an overall reduction in pain of 70% during the trial   The patient reported functional improvement included the ability to walk without stopping, and the ability to  a grocery line without severe pain in her lower back  Pain Assessment Measures   Numeric Rating Scale 3   Oswestry Disability Index Score/Neck Disability Index Score 48   PROMIS-29   - Physical Function 13   - Anxiety 11   - Depression 11   - Fatigue 12   - Sleep Disturbance 12   - Ability to Participate in Social Roles And Activities 12   - Pain Interference 11     Review of Systems   Respiratory: Negative for shortness of breath  Cardiovascular: Negative for chest pain  Gastrointestinal: Negative for constipation, diarrhea, nausea and vomiting  Musculoskeletal: Positive for back pain   Negative for arthralgias, gait problem, joint swelling and myalgias  Skin: Negative for rash  Neurological: Negative for dizziness, seizures and weakness  All other systems reviewed and are negative  Patient Active Problem List   Diagnosis    Colon polyp    Internal hemorrhoids    Family history of colorectal cancer    Vitamin D deficiency    Iron deficiency    H/O gastric bypass    B12 deficiency    Moderate episode of recurrent major depressive disorder (HCC)    Fibromyalgia    Chronic low back pain with left-sided sciatica    Bilateral hip pain    Obesity, Class II, BMI 35-39 9    Encounter for surgical aftercare following surgery of digestive system    Postsurgical malabsorption    Constipation    Bariatric surgery status    Sacroiliitis (HCC)    Piriformis syndrome of left side    Piriformis syndrome of right side    Trochanteric bursitis of both hips    Lumbar facet arthropathy    Osteopenia    Anxiety    Lumbar foraminal stenosis    Spinal stenosis of lumbar region with neurogenic claudication    Chronic pain syndrome    Spasm of right piriformis muscle        Past Medical History:   Diagnosis Date    Anxiety     Fibromyalgia     Medical history unknown     Osteopenia        Past Surgical History:   Procedure Laterality Date    CARPAL TUNNEL RELEASE Bilateral     21 y o  with left ulnar nerve release        CARPAL TUNNEL RELEASE Bilateral      SECTION      ELBOW SURGERY      Tennis elbow    GASTRIC BYPASS      REDUCTION MAMMAPLASTY Bilateral     WISDOM TOOTH EXTRACTION         Family History   Problem Relation Age of Onset    Ovarian cancer Mother     Heart disease Mother     Colon cancer Father     No Known Problems Maternal Grandmother     No Known Problems Maternal Grandfather     Diabetes Paternal Grandmother     No Known Problems Paternal Grandfather     No Known Problems Son     Leukemia Maternal Uncle     Diabetes Paternal Aunt     Diabetes Paternal Uncle Social History     Occupational History    Not on file   Tobacco Use    Smoking status: Never Smoker    Smokeless tobacco: Never Used   Vaping Use    Vaping Use: Never used   Substance and Sexual Activity    Alcohol use: No    Drug use: Never    Sexual activity: Not on file         Current Outpatient Medications:     ascorbic acid (VITAMIN C) 500 MG tablet, Take 500 mg by mouth daily, Disp: , Rfl:     baclofen 20 mg tablet, Take 1 tablet (20 mg total) by mouth 2 (two) times a day as needed for muscle spasms, Disp: 60 tablet, Rfl: 1    cephalexin (KEFLEX) 500 mg capsule, Take 1 capsule (500 mg total) by mouth every 12 (twelve) hours for 7 days, Disp: 14 capsule, Rfl: 0    Diclofenac Sodium (VOLTAREN) 1 %, APPLY 2 GRAMS TO THE AFFECTED AREA(S) BY TOPICAL ROUTE 4 TIMES PER DAY, Disp: , Rfl:     doxepin (SINEquan) 150 MG capsule, , Disp: , Rfl:     DULoxetine (CYMBALTA) 30 mg delayed release capsule, TAKE 1 CAPSULE BY MOUTH EVERY DAY, Disp: 90 capsule, Rfl: 0    ergocalciferol (VITAMIN D2) 50,000 units, Take 1 capsule (50,000 Units total) by mouth every 28 days, Disp: 4 capsule, Rfl: 2    gabapentin (NEURONTIN) 300 mg capsule, Take 1 capsule (300 mg total) by mouth 3 (three) times a day In addition to 600 mg TID for a total of 900 mg TID, Disp: 90 capsule, Rfl: 2    gabapentin (Neurontin) 600 MG tablet, Take 1 tablet (600 mg total) by mouth 3 (three) times a day, Disp: 90 tablet, Rfl: 2    valsartan (DIOVAN) 160 mg tablet, TAKE 1 TABLET BY MOUTH EVERY DAY, Disp: 30 tablet, Rfl: 5    vitamin B-12 (VITAMIN B-12) 500 mcg tablet, TAKE 2 TABLETS (1,000 MCG TOTAL) BY MOUTH DAILY, Disp: 60 tablet, Rfl: 2    No Known Allergies      Physical Exam    /84   Pulse 68   Resp 18   Ht 5' 1" (1 549 m)   Wt 88 5 kg (195 lb)   BMI 36 84 kg/m²     Thoracic Spine:  Spinal cord stimulator lead removed with tip intact; no erythema, tenderness or signs of infection; area cleansed with alcohol and bandage placed

## 2022-06-24 NOTE — TELEPHONE ENCOUNTER
Pt called asking she would need a  for her appmt today- informed pt that she would but after rep checked further with Stim Coord, was informed that pt will not need a - called patient back to informed her but her VM was full

## 2022-07-14 ENCOUNTER — TELEPHONE (OUTPATIENT)
Dept: FAMILY MEDICINE CLINIC | Facility: CLINIC | Age: 57
End: 2022-07-14

## 2022-07-19 ENCOUNTER — VBI (OUTPATIENT)
Dept: ADMINISTRATIVE | Facility: OTHER | Age: 57
End: 2022-07-19

## 2022-07-21 ENCOUNTER — SOCIAL WORK (OUTPATIENT)
Dept: BEHAVIORAL/MENTAL HEALTH CLINIC | Facility: CLINIC | Age: 57
End: 2022-07-21
Payer: COMMERCIAL

## 2022-07-21 DIAGNOSIS — F33.1 MODERATE EPISODE OF RECURRENT MAJOR DEPRESSIVE DISORDER (HCC): Primary | ICD-10-CM

## 2022-07-21 PROCEDURE — 90834 PSYTX W PT 45 MINUTES: CPT | Performed by: COUNSELOR

## 2022-07-21 NOTE — PSYCH
Psychotherapy Provided: Individual Psychotherapy 42 minutes     Length of time in session: 42 minutes, follow up in 4 week    Encounter Diagnosis     ICD-10-CM    1  Moderate episode of recurrent major depressive disorder ([facility] [address] )  F33 1        Goals addressed in session: Goal 1 and Goal 2     Pain:      moderate to severe    6    Current suicide risk : Low     Data: Moris Lockwood discussed her pain issues and shared that now her ankle and foot are a big problem and she almost does not feel her back pain because of the new one, after she fell several times recently  Person-Centered and CBT techniques were combined to praise and encourage even more positive self-talk and optimism  Assessment: Moris Lockwood was less emotional than before, handling her feelings better, she was dealing with the pain in an impressive way as well as showed strength for forgiveness and moving on  Plan: Moris Lockwood will come monthly until she transitions to her new therapist due to change of insurance  She will continue expressing her struggle with chronic acute pain  Behavioral Health Treatment Plan [facility]: Diagnosis and Treatment Plan explained to Gerhardt Lineman relates understanding diagnosis and is agreeable to Treatment Plan   Yes

## 2022-07-25 LAB
DME PARACHUTE DELIVERY DATE ACTUAL: NORMAL
DME PARACHUTE DELIVERY DATE EXPECTED: NORMAL
DME PARACHUTE DELIVERY DATE REQUESTED: NORMAL
DME PARACHUTE ITEM DESCRIPTION: NORMAL
DME PARACHUTE ORDER STATUS: NORMAL
DME PARACHUTE SUPPLIER NAME: NORMAL
DME PARACHUTE SUPPLIER PHONE: NORMAL

## 2022-07-26 ENCOUNTER — CONSULT (OUTPATIENT)
Dept: NEUROSURGERY | Facility: CLINIC | Age: 57
End: 2022-07-26
Payer: COMMERCIAL

## 2022-07-26 VITALS
SYSTOLIC BLOOD PRESSURE: 142 MMHG | DIASTOLIC BLOOD PRESSURE: 90 MMHG | HEIGHT: 61 IN | BODY MASS INDEX: 36.63 KG/M2 | RESPIRATION RATE: 16 BRPM | TEMPERATURE: 98.1 F | WEIGHT: 194 LBS | HEART RATE: 82 BPM

## 2022-07-26 DIAGNOSIS — M54.16 LUMBAR RADICULOPATHY: Primary | ICD-10-CM

## 2022-07-26 DIAGNOSIS — M48.062 SPINAL STENOSIS OF LUMBAR REGION WITH NEUROGENIC CLAUDICATION: ICD-10-CM

## 2022-07-26 PROCEDURE — 99204 OFFICE O/P NEW MOD 45 MIN: CPT | Performed by: STUDENT IN AN ORGANIZED HEALTH CARE EDUCATION/TRAINING PROGRAM

## 2022-07-26 RX ORDER — CEFAZOLIN SODIUM 2 G/50ML
2000 SOLUTION INTRAVENOUS ONCE
Status: CANCELLED | OUTPATIENT
Start: 2022-08-15 | End: 2022-07-26

## 2022-07-26 RX ORDER — SODIUM CHLORIDE 9 MG/ML
125 INJECTION, SOLUTION INTRAVENOUS CONTINUOUS
Status: CANCELLED | OUTPATIENT
Start: 2022-08-15

## 2022-07-26 RX ORDER — CHLORHEXIDINE GLUCONATE 0.12 MG/ML
15 RINSE ORAL ONCE
Status: CANCELLED | OUTPATIENT
Start: 2022-07-26 | End: 2022-07-26

## 2022-07-26 NOTE — PROGRESS NOTES
Office Note - Neurosurgery   Last Torres 62 y o  female MRN: [id number]      Assessment and Plan: This is a 68-year-old female with history of chronic low back and right leg pain status post successful Nevro spinal cord stimulator trial presenting for permanent stimulator placement consultation  Given the benefit that the patient received with the trial, I believe proceeding with permanent stimulator placement is warranted  I sat down with the patient and had an extensive discussion about the procedure including the details of the procedure as well as it's risks and benefits  Risks of the procedure include but are not limited to bleeding, infection, nerve injury or spinal cord injury which can lead to extremity weakness, numbness, tingling, paralysis, or bowel/bladder dysfunction  There is also risk of CSF leak which may require additional procedures to repair  Major procedure related risks are epidural hematoma which may require evacuation  In addition, there is risk of hardware failure  The patient understands the necessity of future pulse generator replacement surgeries  The patient provided verbal consent to the surgical procedure and signed the consent form  Expected postoperative course, including activity restrictions, expected pain and postoperative medication were reviewed  In the meantime, the patient will obtain medical clearance from their primary care physician  We will proceed with scheduling the patient for the procedure  History, physical examination and diagnostic tests were reviewed and questions answered  Diagnosis, care plan and treatment options were discussed  The patient understand instructions and will follow up as directed      Follow-up:  Scheduled for surgery    Diagnoses and all orders for this visit:    Lumbar radiculopathy  -     Case request operating room: Thoracic laminectomies for placement of spinal cord stimulator lead and internal pulse generator, left-sided pulse generator, with neuro monitoring; Standing  -     Case request operating room: Thoracic laminectomies for placement of spinal cord stimulator lead and internal pulse generator, left-sided pulse generator, with neuro monitoring    Spinal stenosis of lumbar region with neurogenic claudication  -     Ambulatory referral to Neurosurgery  -     Case request operating room: Thoracic laminectomies for placement of spinal cord stimulator lead and internal pulse generator, left-sided pulse generator, with neuro monitoring; Standing  -     Case request operating room: Thoracic laminectomies for placement of spinal cord stimulator lead and internal pulse generator, left-sided pulse generator, with neuro monitoring    Other orders  -     Diet NPO; Sips with meds; Standing  -     Nursing Communication [address] Interventions Implemented; Standing  -     Nursing Communication [facility] bath, have staff wash entire body (neck down) per pre-op bathing protocol  Routine, evening prior to, and day of surgery ; Standing  -     Nursing Communication Swab both nares with Povidone-Iodine solution, EXCLUDE if patient has shellfish/Iodine allergy  Routine, day of surgery, on call to OR; Standing  -     chlorhexidine (PERIDEX) 0 12 % oral rinse 15 mL  -     Void on call to OR; Standing  -     Insert peripheral IV; Standing  -     sodium chloride 0 9 % infusion  -     ceFAZolin (ANCEF) IVPB (premix in dextrose) 2,000 mg 50 mL        Subjective/Objective     Chief Complaint    Low back pain and primarily right leg pain    HPI    This is a 63-year-old female with history of low back pain and right leg pain status post successful spinal cord stimulator trial with Nevro, presenting for surgical consultation for permanent stimulator implantation  The patient states her symptoms began 2 years ago    She reports having low back pain which radiates down the right buttock into the posterolateral aspect of her thigh to the lateral aspect of her leg to her foot  There is numbness and tingling in her foot  She has tried avenues of conservative management however they did not help  She recently had the stimulator trial with Nevro and reports having over 80% symptomatic relief  She states with the stimulator trial, she was able to sleep better  She was able to perform most of her usual activities without being in pain  ROS  MELISSA personally reviewed and updated  Review of Systems   Constitutional: Negative  HENT: Negative  Eyes: Negative  Respiratory: Negative  Cardiovascular: Negative  Gastrointestinal: Negative  Endocrine: Negative  Genitourinary: Negative  Musculoskeletal: Positive for back pain (LBP radiating bi/hips & buttock right worse than left, outer left leg down to mid outer thigh/ outer right leg down to toes) and gait problem (trouble walking)  Skin: Negative  Allergic/Immunologic: Negative  Neurological: Positive for weakness (bi/legs, right is worse) and numbness (right foot numbness)  Fibromyalgia   Hematological: Negative  Psychiatric/Behavioral: Negative  Family History    Family History   Problem Relation Age of Onset    Ovarian cancer Mother     Heart disease Mother     Colon cancer Father     No Known Problems Maternal Grandmother     No Known Problems Maternal Grandfather     Diabetes Paternal Grandmother     No Known Problems Paternal Grandfather     No Known Problems Son     Leukemia Maternal Uncle     Diabetes Paternal Aunt     Diabetes Paternal Uncle        Social History    Social History     Socioeconomic History    Marital status:       Spouse name: Not on file    Number of children: Not on file    Years of education: Not on file    Highest education level: Not on file   Occupational History    Not on file   Tobacco Use    Smoking status: Never Smoker    Smokeless tobacco: Never Used   Vaping Use    Vaping Use: Never used   Substance and Sexual Activity    Alcohol use: No    Drug use: Never    Sexual activity: Not on file   Other Topics Concern    Not on file   Social History Narrative    Not on file     Social Determinants of Health     Financial Resource Strain: Not on file   Food Insecurity: Not on file   Transportation Needs: Not on file   Physical Activity: Not on file   Stress: Not on file   Social Connections: Not on file   Intimate Partner Violence: Not on file   Housing Stability: Not on file       Past Medical History    Past Medical History:   Diagnosis Date    Anxiety     Fibromyalgia     Medical history unknown     Osteopenia        Surgical History    Past Surgical History:   Procedure Laterality Date    CARPAL TUNNEL RELEASE Bilateral     21 y o  with left ulnar nerve release        CARPAL TUNNEL RELEASE Bilateral      SECTION      ELBOW SURGERY      Tennis elbow    GASTRIC BYPASS      REDUCTION MAMMAPLASTY Bilateral     WISDOM TOOTH EXTRACTION         Medications      Current Outpatient Medications:     ascorbic acid (VITAMIN C) 500 MG tablet, Take 1 tablet (500 mg total) by mouth daily, Disp: 90 tablet, Rfl: 1    baclofen 20 mg tablet, Take 1 tablet (20 mg total) by mouth 2 (two) times a day as needed for muscle spasms, Disp: 180 tablet, Rfl: 1    Diclofenac Sodium (VOLTAREN) 1 %, APPLY 2 GRAMS TO THE AFFECTED AREA(S) BY TOPICAL ROUTE 4 TIMES PER DAY, Disp: , Rfl:     doxepin (SINEquan) 150 MG capsule, Take 1 capsule (150 mg total) by mouth daily at bedtime, Disp: 90 capsule, Rfl: 1    ergocalciferol (VITAMIN D2) 50,000 units, TAKE 1 CAPSULE (50,000 UNITS TOTAL) BY MOUTH EVERY 28 DAYS, Disp: 3 capsule, Rfl: 0    gabapentin (NEURONTIN) 300 mg capsule, Take 1 capsule (300 mg total) by mouth 3 (three) times a day In addition to 600 mg TID for a total of 900 mg TID, Disp: 270 capsule, Rfl: 1    gabapentin (Neurontin) 600 MG tablet, Take 1 tablet (600 mg total) by mouth 3 (three) times a day, Disp: 270 tablet, Rfl: 1    valsartan (DIOVAN) 160 mg tablet, TAKE 1 TABLET BY MOUTH EVERY DAY, Disp: 30 tablet, Rfl: 5    vitamin B-12 (VITAMIN B-12) 500 mcg tablet, Take 1 tablet (500 mcg total) by mouth daily, Disp: 90 tablet, Rfl: 1    Allergies    No Known Allergies    The following portions of the patient's history were reviewed and updated as appropriate: allergies, current medications, past family history, past medical history, past social history, past surgical history and problem list     Investigations    I personally reviewed the XRAY results with the patient:      Physical Exam    Vitals:  Blood pressure 142/90, pulse 82, temperature 98 1 °F (36 7 °C), temperature source Tympanic, resp  rate 16, height 5' 1" (1 549 m), weight 88 kg (194 lb)  ,Body mass index is 36 66 kg/m²      General:  Normal, well developed, not in distress/pain     Skin:   No issues, no rashes noted     Musculoskeletal:   5/5 strength throughout all muscle groups  No tenderness to palpation of the spine       Neurologic Exam:  Awake and alert  Oriented x3  Speech clear and fluent  LU   Sensation to light touch and pin prick intact throughout  No lambert's  No clonus  2+ patellar reflexes     Gait:   normal gait, normal posture

## 2022-08-08 ENCOUNTER — APPOINTMENT (OUTPATIENT)
Dept: LAB | Facility: HOSPITAL | Age: 57
End: 2022-08-08
Attending: STUDENT IN AN ORGANIZED HEALTH CARE EDUCATION/TRAINING PROGRAM
Payer: COMMERCIAL

## 2022-08-08 DIAGNOSIS — M48.062 SPINAL STENOSIS OF LUMBAR REGION WITH NEUROGENIC CLAUDICATION: ICD-10-CM

## 2022-08-08 DIAGNOSIS — M54.16 LUMBAR RADICULOPATHY: ICD-10-CM

## 2022-08-08 LAB
ALBUMIN SERPL BCP-MCNC: 3.9 G/DL (ref 3.5–5)
ALP SERPL-CCNC: 61 U/L (ref 34–104)
ALT SERPL W P-5'-P-CCNC: 18 U/L (ref 7–52)
ANION GAP SERPL CALCULATED.3IONS-SCNC: 9 MMOL/L (ref 4–13)
APTT PPP: 29 SECONDS (ref 23–37)
AST SERPL W P-5'-P-CCNC: 18 U/L (ref 13–39)
BACTERIA UR QL AUTO: NORMAL /HPF
BASOPHILS # BLD AUTO: 0.03 THOUSANDS/ΜL (ref 0–0.1)
BASOPHILS NFR BLD AUTO: 1 % (ref 0–1)
BILIRUB SERPL-MCNC: 0.55 MG/DL (ref 0.2–1)
BILIRUB UR QL STRIP: NEGATIVE
BUN SERPL-MCNC: 13 MG/DL (ref 5–25)
CALCIUM SERPL-MCNC: 8.7 MG/DL (ref 8.4–10.2)
CHLORIDE SERPL-SCNC: 104 MMOL/L (ref 96–108)
CLARITY UR: CLEAR
CO2 SERPL-SCNC: 26 MMOL/L (ref 21–32)
COLOR UR: YELLOW
CREAT SERPL-MCNC: 0.67 MG/DL (ref 0.6–1.3)
EOSINOPHIL # BLD AUTO: 0.11 THOUSAND/ΜL (ref 0–0.61)
EOSINOPHIL NFR BLD AUTO: 2 % (ref 0–6)
ERYTHROCYTE [DISTWIDTH] IN BLOOD BY AUTOMATED COUNT: 10.9 % (ref 11.6–15.1)
EST. AVERAGE GLUCOSE BLD GHB EST-MCNC: 117 MG/DL
GFR SERPL CREATININE-BSD FRML MDRD: 97 ML/MIN/1.73SQ M
GLUCOSE P FAST SERPL-MCNC: 93 MG/DL (ref 65–99)
GLUCOSE UR STRIP-MCNC: NEGATIVE MG/DL
HBA1C MFR BLD: 5.7 %
HCT VFR BLD AUTO: 39 % (ref 34.8–46.1)
HGB BLD-MCNC: 13.4 G/DL (ref 11.5–15.4)
HGB UR QL STRIP.AUTO: NEGATIVE
IMM GRANULOCYTES # BLD AUTO: 0.01 THOUSAND/UL (ref 0–0.2)
IMM GRANULOCYTES NFR BLD AUTO: 0 % (ref 0–2)
INR PPP: 1.02 (ref 0.84–1.19)
KETONES UR STRIP-MCNC: NEGATIVE MG/DL
LEUKOCYTE ESTERASE UR QL STRIP: ABNORMAL
LYMPHOCYTES # BLD AUTO: 1.86 THOUSANDS/ΜL (ref 0.6–4.47)
LYMPHOCYTES NFR BLD AUTO: 32 % (ref 14–44)
MCH RBC QN AUTO: 31.8 PG (ref 26.8–34.3)
MCHC RBC AUTO-ENTMCNC: 34.4 G/DL (ref 31.4–37.4)
MCV RBC AUTO: 93 FL (ref 82–98)
MONOCYTES # BLD AUTO: 0.43 THOUSAND/ΜL (ref 0.17–1.22)
MONOCYTES NFR BLD AUTO: 7 % (ref 4–12)
NEUTROPHILS # BLD AUTO: 3.42 THOUSANDS/ΜL (ref 1.85–7.62)
NEUTS SEG NFR BLD AUTO: 58 % (ref 43–75)
NITRITE UR QL STRIP: NEGATIVE
NON-SQ EPI CELLS URNS QL MICRO: NORMAL /HPF
NRBC BLD AUTO-RTO: 0 /100 WBCS
PH UR STRIP.AUTO: 6 [PH]
PLATELET # BLD AUTO: 214 THOUSANDS/UL (ref 149–390)
PMV BLD AUTO: 9.9 FL (ref 8.9–12.7)
POTASSIUM SERPL-SCNC: 3.6 MMOL/L (ref 3.5–5.3)
PROT SERPL-MCNC: 6.9 G/DL (ref 6.4–8.4)
PROT UR STRIP-MCNC: NEGATIVE MG/DL
PROTHROMBIN TIME: 13.7 SECONDS (ref 11.6–14.5)
RBC # BLD AUTO: 4.21 MILLION/UL (ref 3.81–5.12)
RBC #/AREA URNS AUTO: NORMAL /HPF
SODIUM SERPL-SCNC: 139 MMOL/L (ref 135–147)
SP GR UR STRIP.AUTO: >=1.03 (ref 1–1.03)
UROBILINOGEN UR QL STRIP.AUTO: 1 E.U./DL
WBC # BLD AUTO: 5.86 THOUSAND/UL (ref 4.31–10.16)
WBC #/AREA URNS AUTO: NORMAL /HPF

## 2022-08-08 PROCEDURE — 83036 HEMOGLOBIN GLYCOSYLATED A1C: CPT

## 2022-08-08 PROCEDURE — 85730 THROMBOPLASTIN TIME PARTIAL: CPT

## 2022-08-08 PROCEDURE — 81001 URINALYSIS AUTO W/SCOPE: CPT | Performed by: STUDENT IN AN ORGANIZED HEALTH CARE EDUCATION/TRAINING PROGRAM

## 2022-08-08 PROCEDURE — 85610 PROTHROMBIN TIME: CPT

## 2022-08-08 PROCEDURE — 85025 COMPLETE CBC W/AUTO DIFF WBC: CPT

## 2022-08-08 PROCEDURE — 80053 COMPREHEN METABOLIC PANEL: CPT

## 2022-08-08 PROCEDURE — 36415 COLL VENOUS BLD VENIPUNCTURE: CPT

## 2022-08-08 PROCEDURE — 81003 URINALYSIS AUTO W/O SCOPE: CPT | Performed by: STUDENT IN AN ORGANIZED HEALTH CARE EDUCATION/TRAINING PROGRAM

## 2022-08-10 RX ORDER — IBUPROFEN 600 MG/1
TABLET ORAL EVERY 6 HOURS PRN
COMMUNITY
End: 2022-08-15

## 2022-08-10 RX ORDER — METHYLPREDNISOLONE 4 MG/1
TABLET ORAL
COMMUNITY
Start: 2022-06-28 | End: 2022-08-11

## 2022-08-10 NOTE — PRE-PROCEDURE INSTRUCTIONS
Pre-Surgery Instructions:   Medication Instructions    ascorbic acid (VITAMIN C) 500 MG tablet Stop taking 7 days prior to surgery   baclofen 20 mg tablet Uses PRN- DO NOT take day of surgery    doxepin (SINEquan) 150 MG capsule Take night before surgery    ergocalciferol (VITAMIN D2) 50,000 units Stop taking 7 days prior to surgery   gabapentin (NEURONTIN) 300 mg capsule Take day of surgery   gabapentin (Neurontin) 600 MG tablet Take day of surgery   ibuprofen (MOTRIN) 600 mg tablet Stop taking 3 days prior to surgery   valsartan (DIOVAN) 160 mg tablet Hold day of surgery   vitamin B-12 (VITAMIN B-12) 500 mcg tablet Stop taking 7 days prior to surgery       Pt verbalizes understanding of the following:    - Bathing instructions, has chg, neck down, no genitals  - No lotions, powders, sprays, deodorant, jewelry, body piercings or make-up  - No shaving legs within 24hrs    - NPO after MN  - Avoid OTC non-directed Vit/ Suppl/ Herbals 7 days prior to surgery to ensure no drug interactions with perioperative surgical/ anesthetic meds  - Avoid NSAIDs 3 days prior  - Avoid ASA containing products 5 days prior    - Bring list of meds with last dose noted  - [employer] cards & photo id
Ambulatory

## 2022-08-11 ENCOUNTER — OFFICE VISIT (OUTPATIENT)
Dept: FAMILY MEDICINE CLINIC | Facility: CLINIC | Age: 57
End: 2022-08-11
Payer: COMMERCIAL

## 2022-08-11 VITALS
BODY MASS INDEX: 37.38 KG/M2 | RESPIRATION RATE: 16 BRPM | HEART RATE: 97 BPM | WEIGHT: 198 LBS | DIASTOLIC BLOOD PRESSURE: 78 MMHG | HEIGHT: 61 IN | OXYGEN SATURATION: 96 % | SYSTOLIC BLOOD PRESSURE: 134 MMHG

## 2022-08-11 DIAGNOSIS — Z01.818 PRE-OP EXAM: Primary | ICD-10-CM

## 2022-08-11 DIAGNOSIS — M48.062 SPINAL STENOSIS OF LUMBAR REGION WITH NEUROGENIC CLAUDICATION: ICD-10-CM

## 2022-08-11 DIAGNOSIS — E66.01 CLASS 2 SEVERE OBESITY DUE TO EXCESS CALORIES WITH SERIOUS COMORBIDITY AND BODY MASS INDEX (BMI) OF 37.0 TO 37.9 IN ADULT (HCC): ICD-10-CM

## 2022-08-11 DIAGNOSIS — F33.1 MODERATE EPISODE OF RECURRENT MAJOR DEPRESSIVE DISORDER (HCC): ICD-10-CM

## 2022-08-11 DIAGNOSIS — G89.4 CHRONIC PAIN SYNDROME: ICD-10-CM

## 2022-08-11 DIAGNOSIS — G47.00 INSOMNIA, UNSPECIFIED TYPE: ICD-10-CM

## 2022-08-11 DIAGNOSIS — I10 PRIMARY HYPERTENSION: ICD-10-CM

## 2022-08-11 PROCEDURE — 99214 OFFICE O/P EST MOD 30 MIN: CPT | Performed by: FAMILY MEDICINE

## 2022-08-11 NOTE — PROGRESS NOTES
Assessment/Plan:    1  Pre-op exam  Comments:  ekg and labs reviewed   CLEARED FOR PROPOSED SURGERY    2  Chronic pain syndrome    3  Class 2 severe obesity due to excess calories with serious comorbidity and body mass index (BMI) of 37 0 to 37 9 in adult ([facility] [address] )    4  Primary hypertension    5  Moderate episode of recurrent major depressive disorder ([facility] [address] )    6  Insomnia, unspecified type    7  Spinal stenosis of lumbar region with neurogenic claudication       Subjective:      Patient ID: Ashish Shelley is a 62 y o  female  HPI  Here for pre op for spinal cord stim for the 15th with sue     She is still fighting with the right ankle   And seeing podiatry     Also she is really depressed over the wt gain too     A1c  5 7 will go away with a bit of wt loss   No anemia  k was 3 6  Eat a banana daily till surgery     Lavelle 900 tid     Baclofen 20mg prn leg cramps      And htn controlled on valsartan 160mg     Will run ekg given the TCA she is on       The following portions of the patient's history were reviewed and updated as appropriate: allergies, current medications, past family history, past medical history, past social history, past surgical history and problem list     Review of Systems   Constitutional: Negative for fever and unexpected weight change  HENT: Negative for nosebleeds and trouble swallowing  Eyes: Negative for visual disturbance  Respiratory: Negative for chest tightness and shortness of breath  Cardiovascular: Negative for chest pain, palpitations and leg swelling  Gastrointestinal: Negative for abdominal pain, constipation, diarrhea and nausea  Endocrine: Negative for cold intolerance  Genitourinary: Negative for dysuria and urgency  Musculoskeletal: Positive for back pain  Negative for joint swelling and myalgias  Skin: Negative for rash  Neurological: Negative for tremors, seizures and syncope  Hematological: Does not bruise/bleed easily     Psychiatric/Behavioral: Positive for dysphoric mood  Negative for hallucinations and suicidal ideas  Objective:      /78 (BP Location: Left arm, Patient Position: Sitting, Cuff Size: Large)   Pulse 97   Resp 16   Ht 5' 1" (1 549 m)   Wt 89 8 kg (198 lb)   SpO2 96%   BMI 37 41 kg/m²     Appointment on 08/08/2022   Component Date Value    Sodium 08/08/2022 139     Potassium 08/08/2022 3 6     Chloride 08/08/2022 104     CO2 08/08/2022 26     ANION GAP 08/08/2022 9     BUN 08/08/2022 13     Creatinine 08/08/2022 0 67     Glucose, Fasting 08/08/2022 93     Calcium 08/08/2022 8 7     AST 08/08/2022 18     ALT 08/08/2022 18     Alkaline Phosphatase 08/08/2022 61     Total Protein 08/08/2022 6 9     Albumin 08/08/2022 3 9     Total Bilirubin 08/08/2022 0 55     eGFR 08/08/2022 97     WBC 08/08/2022 5 86     RBC 08/08/2022 4 21     Hemoglobin 08/08/2022 13 4     Hematocrit 08/08/2022 39 0     MCV 08/08/2022 93     MCH 08/08/2022 31 8     MCHC 08/08/2022 34 4     RDW 08/08/2022 10 9 (A)    MPV 08/08/2022 9 9     Platelets 50/81/6095 214     nRBC 08/08/2022 0     Neutrophils Relative 08/08/2022 58     Immat GRANS % 08/08/2022 0     Lymphocytes Relative 08/08/2022 32     Monocytes Relative 08/08/2022 7     Eosinophils Relative 08/08/2022 2     Basophils Relative 08/08/2022 1     Neutrophils Absolute 08/08/2022 3 42     Immature Grans Absolute 08/08/2022 0 01     Lymphocytes Absolute 08/08/2022 1 86     Monocytes Absolute 08/08/2022 0 43     Eosinophils Absolute 08/08/2022 0 11     Basophils Absolute 08/08/2022 0 03     PTT 08/08/2022 29     Protime 08/08/2022 13 7     INR 08/08/2022 1 02     Hemoglobin A1C 08/08/2022 5 7 (A)    EAG 08/08/2022 117           Physical Exam  Vitals and nursing note reviewed  Constitutional:       Appearance: She is well-developed  She is obese  HENT:      Head: Normocephalic and atraumatic     Cardiovascular:      Rate and Rhythm: Normal rate and regular rhythm  Heart sounds: Normal heart sounds  No murmur heard  Pulmonary:      Effort: Pulmonary effort is normal       Breath sounds: Normal breath sounds  No wheezing or rales  Abdominal:      General: Bowel sounds are normal  There is no distension  Palpations: Abdomen is soft  Tenderness: There is no abdominal tenderness  Musculoskeletal:         General: Tenderness present  Normal range of motion  Cervical back: Normal range of motion and neck supple  Lymphadenopathy:      Cervical: No cervical adenopathy  Skin:     General: Skin is warm and dry  Capillary Refill: Capillary refill takes less than 2 seconds  Findings: No rash  Neurological:      Mental Status: She is alert and oriented to person, place, and time  Cranial Nerves: No cranial nerve deficit  Sensory: No sensory deficit  Motor: No abnormal muscle tone  Gait: Gait abnormal    Psychiatric:         Behavior: Behavior normal          Thought Content:  Thought content normal          Judgment: Judgment normal              Gina Lara MD  [place] [id number]

## 2022-08-12 ENCOUNTER — DOCUMENTATION (OUTPATIENT)
Dept: NEUROSURGERY | Facility: CLINIC | Age: 57
End: 2022-08-12

## 2022-08-12 PROBLEM — G47.00 INSOMNIA: Status: ACTIVE | Noted: 2022-08-12

## 2022-08-12 PROBLEM — I10 PRIMARY HYPERTENSION: Status: ACTIVE | Noted: 2022-08-12

## 2022-08-15 ENCOUNTER — HOSPITAL ENCOUNTER (OUTPATIENT)
Facility: HOSPITAL | Age: 57
Setting detail: OUTPATIENT SURGERY
Discharge: HOME | End: 2022-08-15
Attending: STUDENT IN AN ORGANIZED HEALTH CARE EDUCATION/TRAINING PROGRAM | Admitting: STUDENT IN AN ORGANIZED HEALTH CARE EDUCATION/TRAINING PROGRAM
Payer: COMMERCIAL

## 2022-08-15 ENCOUNTER — ANESTHESIA (OUTPATIENT)
Dept: PERIOP | Facility: HOSPITAL | Age: 57
End: 2022-08-15
Payer: COMMERCIAL

## 2022-08-15 ENCOUNTER — APPOINTMENT (OUTPATIENT)
Dept: RADIOLOGY | Facility: HOSPITAL | Age: 57
End: 2022-08-15
Payer: COMMERCIAL

## 2022-08-15 ENCOUNTER — ANESTHESIA EVENT (OUTPATIENT)
Dept: PERIOP | Facility: HOSPITAL | Age: 57
End: 2022-08-15
Payer: COMMERCIAL

## 2022-08-15 ENCOUNTER — TELEPHONE (OUTPATIENT)
Dept: NEUROSURGERY | Facility: CLINIC | Age: 57
End: 2022-08-15

## 2022-08-15 VITALS
SYSTOLIC BLOOD PRESSURE: 136 MMHG | RESPIRATION RATE: 24 BRPM | HEART RATE: 68 BPM | OXYGEN SATURATION: 99 % | WEIGHT: 195.2 LBS | TEMPERATURE: 97.9 F | HEIGHT: 61 IN | BODY MASS INDEX: 36.85 KG/M2 | DIASTOLIC BLOOD PRESSURE: 91 MMHG

## 2022-08-15 DIAGNOSIS — Z96.89 S/P INSERTION OF SPINAL CORD STIMULATOR: Primary | ICD-10-CM

## 2022-08-15 PROBLEM — Z79.899 ON TRICYCLIC ANTIDEPRESSANT THERAPY: Status: ACTIVE | Noted: 2022-08-15

## 2022-08-15 PROCEDURE — 63655 IMPLANT NEUROELECTRODES: CPT | Performed by: PHYSICIAN ASSISTANT

## 2022-08-15 PROCEDURE — 72070 X-RAY EXAM THORAC SPINE 2VWS: CPT

## 2022-08-15 PROCEDURE — C1822 GEN, NEURO, HF, RECHG BAT: HCPCS | Performed by: STUDENT IN AN ORGANIZED HEALTH CARE EDUCATION/TRAINING PROGRAM

## 2022-08-15 PROCEDURE — C1787 PATIENT PROGR, NEUROSTIM: HCPCS | Performed by: STUDENT IN AN ORGANIZED HEALTH CARE EDUCATION/TRAINING PROGRAM

## 2022-08-15 PROCEDURE — C1778 LEAD, NEUROSTIMULATOR: HCPCS | Performed by: STUDENT IN AN ORGANIZED HEALTH CARE EDUCATION/TRAINING PROGRAM

## 2022-08-15 PROCEDURE — C1713 ANCHOR/SCREW BN/BN,TIS/BN: HCPCS | Performed by: STUDENT IN AN ORGANIZED HEALTH CARE EDUCATION/TRAINING PROGRAM

## 2022-08-15 PROCEDURE — 95972 ALYS CPLX SP/PN NPGT W/PRGRM: CPT | Performed by: STUDENT IN AN ORGANIZED HEALTH CARE EDUCATION/TRAINING PROGRAM

## 2022-08-15 PROCEDURE — 63655 IMPLANT NEUROELECTRODES: CPT | Performed by: STUDENT IN AN ORGANIZED HEALTH CARE EDUCATION/TRAINING PROGRAM

## 2022-08-15 PROCEDURE — 63685 INS/RPLC SPI NPG/RCVR POCKET: CPT | Performed by: STUDENT IN AN ORGANIZED HEALTH CARE EDUCATION/TRAINING PROGRAM

## 2022-08-15 PROCEDURE — 63685 INS/RPLC SPI NPG/RCVR POCKET: CPT | Performed by: PHYSICIAN ASSISTANT

## 2022-08-15 DEVICE — SURGICAL LEAD KIT, 50CM
Type: IMPLANTABLE DEVICE | Site: SPINE THORACIC | Status: FUNCTIONAL
Brand: SURPASS™

## 2022-08-15 DEVICE — IMPLANTABLE DEVICE
Type: IMPLANTABLE DEVICE | Site: SPINE THORACIC | Status: FUNCTIONAL
Brand: 1.5MM SYSTEM

## 2022-08-15 DEVICE — IMPLANTABLE DEVICE
Type: IMPLANTABLE DEVICE | Site: SPINE THORACIC | Status: FUNCTIONAL
Brand: OMNIA

## 2022-08-15 RX ORDER — CEFAZOLIN SODIUM 2 G/50ML
2000 SOLUTION INTRAVENOUS ONCE
Status: COMPLETED | OUTPATIENT
Start: 2022-08-15 | End: 2022-08-15

## 2022-08-15 RX ORDER — CEFAZOLIN SODIUM 1 G/3ML
INJECTION, POWDER, FOR SOLUTION INTRAMUSCULAR; INTRAVENOUS AS NEEDED
Status: DISCONTINUED | OUTPATIENT
Start: 2022-08-15 | End: 2022-08-15

## 2022-08-15 RX ORDER — CEPHALEXIN 500 MG/1
500 CAPSULE ORAL EVERY 6 HOURS SCHEDULED
Qty: 28 CAPSULE | Refills: 0 | Status: SHIPPED | OUTPATIENT
Start: 2022-08-15 | End: 2022-08-22

## 2022-08-15 RX ORDER — OXYCODONE HYDROCHLORIDE 5 MG/1
5 TABLET ORAL EVERY 4 HOURS PRN
Qty: 10 TABLET | Refills: 0 | Status: SHIPPED | OUTPATIENT
Start: 2022-08-15 | End: 2022-08-17 | Stop reason: SDUPTHER

## 2022-08-15 RX ORDER — PROPOFOL 10 MG/ML
INJECTION, EMULSION INTRAVENOUS CONTINUOUS PRN
Status: DISCONTINUED | OUTPATIENT
Start: 2022-08-15 | End: 2022-08-15

## 2022-08-15 RX ORDER — MIDAZOLAM HYDROCHLORIDE 2 MG/2ML
INJECTION, SOLUTION INTRAMUSCULAR; INTRAVENOUS AS NEEDED
Status: DISCONTINUED | OUTPATIENT
Start: 2022-08-15 | End: 2022-08-15

## 2022-08-15 RX ORDER — SODIUM CHLORIDE, SODIUM LACTATE, POTASSIUM CHLORIDE, CALCIUM CHLORIDE 600; 310; 30; 20 MG/100ML; MG/100ML; MG/100ML; MG/100ML
INJECTION, SOLUTION INTRAVENOUS CONTINUOUS PRN
Status: DISCONTINUED | OUTPATIENT
Start: 2022-08-15 | End: 2022-08-15

## 2022-08-15 RX ORDER — GLYCOPYRROLATE 0.2 MG/ML
INJECTION INTRAMUSCULAR; INTRAVENOUS AS NEEDED
Status: DISCONTINUED | OUTPATIENT
Start: 2022-08-15 | End: 2022-08-15

## 2022-08-15 RX ORDER — PROPOFOL 10 MG/ML
INJECTION, EMULSION INTRAVENOUS AS NEEDED
Status: DISCONTINUED | OUTPATIENT
Start: 2022-08-15 | End: 2022-08-15

## 2022-08-15 RX ORDER — HYDROMORPHONE HCL/PF 1 MG/ML
SYRINGE (ML) INJECTION AS NEEDED
Status: DISCONTINUED | OUTPATIENT
Start: 2022-08-15 | End: 2022-08-15

## 2022-08-15 RX ORDER — SODIUM CHLORIDE 9 MG/ML
125 INJECTION, SOLUTION INTRAVENOUS CONTINUOUS
Status: DISCONTINUED | OUTPATIENT
Start: 2022-08-15 | End: 2022-08-15 | Stop reason: HOSPADM

## 2022-08-15 RX ORDER — ROCURONIUM BROMIDE 10 MG/ML
INJECTION, SOLUTION INTRAVENOUS AS NEEDED
Status: DISCONTINUED | OUTPATIENT
Start: 2022-08-15 | End: 2022-08-15

## 2022-08-15 RX ORDER — SODIUM CHLORIDE 9 MG/ML
INJECTION, SOLUTION INTRAVENOUS CONTINUOUS PRN
Status: DISCONTINUED | OUTPATIENT
Start: 2022-08-15 | End: 2022-08-15

## 2022-08-15 RX ORDER — NEOSTIGMINE METHYLSULFATE 1 MG/ML
INJECTION INTRAVENOUS AS NEEDED
Status: DISCONTINUED | OUTPATIENT
Start: 2022-08-15 | End: 2022-08-15

## 2022-08-15 RX ORDER — CHLORHEXIDINE GLUCONATE 0.12 MG/ML
15 RINSE ORAL ONCE
Status: COMPLETED | OUTPATIENT
Start: 2022-08-15 | End: 2022-08-15

## 2022-08-15 RX ORDER — SUCCINYLCHOLINE/SOD CL,ISO/PF 100 MG/5ML
SYRINGE (ML) INTRAVENOUS AS NEEDED
Status: DISCONTINUED | OUTPATIENT
Start: 2022-08-15 | End: 2022-08-15

## 2022-08-15 RX ORDER — ONDANSETRON 2 MG/ML
INJECTION INTRAMUSCULAR; INTRAVENOUS AS NEEDED
Status: DISCONTINUED | OUTPATIENT
Start: 2022-08-15 | End: 2022-08-15

## 2022-08-15 RX ORDER — LIDOCAINE HYDROCHLORIDE 10 MG/ML
INJECTION, SOLUTION EPIDURAL; INFILTRATION; INTRACAUDAL; PERINEURAL AS NEEDED
Status: DISCONTINUED | OUTPATIENT
Start: 2022-08-15 | End: 2022-08-15

## 2022-08-15 RX ORDER — OXYCODONE HYDROCHLORIDE 5 MG/1
5 TABLET ORAL EVERY 4 HOURS PRN
Status: COMPLETED | OUTPATIENT
Start: 2022-08-15 | End: 2022-08-15

## 2022-08-15 RX ORDER — ONDANSETRON 2 MG/ML
4 INJECTION INTRAMUSCULAR; INTRAVENOUS ONCE
Status: DISCONTINUED | OUTPATIENT
Start: 2022-08-15 | End: 2022-08-15 | Stop reason: HOSPADM

## 2022-08-15 RX ORDER — FENTANYL CITRATE 50 UG/ML
INJECTION, SOLUTION INTRAMUSCULAR; INTRAVENOUS AS NEEDED
Status: DISCONTINUED | OUTPATIENT
Start: 2022-08-15 | End: 2022-08-15

## 2022-08-15 RX ORDER — HYDROMORPHONE HCL/PF 1 MG/ML
0.5 SYRINGE (ML) INJECTION
Status: DISCONTINUED | OUTPATIENT
Start: 2022-08-15 | End: 2022-08-15 | Stop reason: HOSPADM

## 2022-08-15 RX ORDER — GINSENG 100 MG
CAPSULE ORAL AS NEEDED
Status: DISCONTINUED | OUTPATIENT
Start: 2022-08-15 | End: 2022-08-15 | Stop reason: HOSPADM

## 2022-08-15 RX ORDER — LIDOCAINE HYDROCHLORIDE AND EPINEPHRINE 10; 10 MG/ML; UG/ML
INJECTION, SOLUTION INFILTRATION; PERINEURAL AS NEEDED
Status: DISCONTINUED | OUTPATIENT
Start: 2022-08-15 | End: 2022-08-15 | Stop reason: HOSPADM

## 2022-08-15 RX ORDER — BUPIVACAINE HYDROCHLORIDE 2.5 MG/ML
INJECTION, SOLUTION EPIDURAL; INFILTRATION; INTRACAUDAL AS NEEDED
Status: DISCONTINUED | OUTPATIENT
Start: 2022-08-15 | End: 2022-08-15 | Stop reason: HOSPADM

## 2022-08-15 RX ORDER — FENTANYL CITRATE/PF 50 MCG/ML
25 SYRINGE (ML) INJECTION
Status: DISCONTINUED | OUTPATIENT
Start: 2022-08-15 | End: 2022-08-15 | Stop reason: HOSPADM

## 2022-08-15 RX ADMIN — Medication 100 MG: at 08:35

## 2022-08-15 RX ADMIN — ONDANSETRON 4 MG: 2 INJECTION INTRAMUSCULAR; INTRAVENOUS at 08:27

## 2022-08-15 RX ADMIN — HYDROMORPHONE HYDROCHLORIDE 0.5 MG: 1 INJECTION, SOLUTION INTRAMUSCULAR; INTRAVENOUS; SUBCUTANEOUS at 09:06

## 2022-08-15 RX ADMIN — REMIFENTANIL HYDROCHLORIDE 0.1 MCG/KG/MIN: 1 INJECTION, POWDER, LYOPHILIZED, FOR SOLUTION INTRAVENOUS at 08:41

## 2022-08-15 RX ADMIN — HYDROMORPHONE HYDROCHLORIDE 0.5 MG: 1 INJECTION, SOLUTION INTRAMUSCULAR; INTRAVENOUS; SUBCUTANEOUS at 10:39

## 2022-08-15 RX ADMIN — CEFAZOLIN SODIUM 2000 MG: 1 INJECTION, POWDER, FOR SOLUTION INTRAMUSCULAR; INTRAVENOUS at 09:35

## 2022-08-15 RX ADMIN — OXYCODONE HYDROCHLORIDE 5 MG: 5 TABLET ORAL at 11:43

## 2022-08-15 RX ADMIN — NEOSTIGMINE METHYLSULFATE 2 MG: 1 INJECTION INTRAVENOUS at 10:01

## 2022-08-15 RX ADMIN — GLYCOPYRROLATE 0.4 MG: 0.4 INJECTION INTRAMUSCULAR; INTRAVENOUS at 10:01

## 2022-08-15 RX ADMIN — MIDAZOLAM HYDROCHLORIDE 2 MG: 1 INJECTION, SOLUTION INTRAMUSCULAR; INTRAVENOUS at 08:27

## 2022-08-15 RX ADMIN — PROPOFOL 200 MG: 10 INJECTION, EMULSION INTRAVENOUS at 08:35

## 2022-08-15 RX ADMIN — DEXMEDETOMIDINE HYDROCHLORIDE 0.2 MCG/KG/HR: 100 INJECTION, SOLUTION INTRAVENOUS at 08:41

## 2022-08-15 RX ADMIN — ROCURONIUM BROMIDE 15 MG: 10 INJECTION, SOLUTION INTRAVENOUS at 09:28

## 2022-08-15 RX ADMIN — CHLORHEXIDINE GLUCONATE 15 ML: 1.2 SOLUTION ORAL at 07:12

## 2022-08-15 RX ADMIN — FENTANYL CITRATE 25 MCG: 50 INJECTION, SOLUTION INTRAMUSCULAR; INTRAVENOUS at 10:59

## 2022-08-15 RX ADMIN — SODIUM CHLORIDE: 0.9 INJECTION, SOLUTION INTRAVENOUS at 08:36

## 2022-08-15 RX ADMIN — FENTANYL CITRATE 50 MCG: 50 INJECTION, SOLUTION INTRAMUSCULAR; INTRAVENOUS at 08:35

## 2022-08-15 RX ADMIN — CEFAZOLIN SODIUM 2000 MG: 2 SOLUTION INTRAVENOUS at 08:28

## 2022-08-15 RX ADMIN — PROPOFOL 100 MCG/KG/MIN: 10 INJECTION, EMULSION INTRAVENOUS at 08:41

## 2022-08-15 RX ADMIN — FENTANYL CITRATE 25 MCG: 50 INJECTION, SOLUTION INTRAMUSCULAR; INTRAVENOUS at 11:05

## 2022-08-15 RX ADMIN — LIDOCAINE HYDROCHLORIDE 50 MG: 10 INJECTION, SOLUTION EPIDURAL; INFILTRATION; INTRACAUDAL at 08:35

## 2022-08-15 RX ADMIN — SODIUM CHLORIDE, SODIUM LACTATE, POTASSIUM CHLORIDE, AND CALCIUM CHLORIDE: .6; .31; .03; .02 INJECTION, SOLUTION INTRAVENOUS at 07:19

## 2022-08-15 NOTE — ANESTHESIA POSTPROCEDURE EVALUATION
Post-Op Assessment Note    CV Status:  Stable  Pain Score: 3    Pain management: adequate     Mental Status:  Alert and awake   Hydration Status:  Euvolemic   PONV Controlled:  Controlled   Airway Patency:  Patent      Post Op Vitals Reviewed: Yes      Staff: CRNA         No complications documented      BP   116/57   Temp     Pulse  71   Resp   16   SpO2   97

## 2022-08-15 NOTE — DISCHARGE INSTRUCTIONS
Nit[facility] OP Spinal Cord Stimulator Discharge Instructions    Post op Management  After your spinal cord stimulator surgery, you will work closely  with your spinal cord stimulator representative in the postoperative course who will be available for any technical questions you may have concerning the management of your new stimulator  The representatives will do all teaching and education regarding the spinal cord stimulator prior to your discharge from the hospital  You will see your pain management physician 1  week after surgery for management of your pain medications, and you will see you surgeon for your first post-operative  appointment 2 weeks after surgery  Success of this surgery depends on this integrated approach  Expected Hospital Stay  Most patients will be expected to leave the hospital the same  day of the surgery  After the procedure, you will stay in the postop unit where you will be monitored closely  Once you receive education on how to use the  and have had the device programmed by your representative, you will then be able to leave the hospital     Care of Your Incision  You will have two incisions  One located at the center of your spine usually in the mid to upper back region  The other incision will be located in the right or left upper buttock area  The buttock  incision is the site where the battery will be surgically placed, and you will decide prior to surgery which side you prefer for the battery  Both incisions will be closed in the same manner  Your incisions will be closed with buried stitches that will dissolve within a couple of weeks  Strips of adhesive tape called SteriStrips will be placed over your incisions and will fall off on their own over the course of a week  The surgical dressings should be left on for 3 days after surgery  Then after 3 days, you may shower but you should not scrub your incision or soak in a pool, hot tub or tub bath for at least two  weeks   It is ok to gently pat it dry  Slight drainage the first day or so, limited swelling or mild  bruising is common and usually not of concern  If there is significant leaking or any marked redness or a large amount of swelling you should call the office  Post-op Pain Management  Most patients after spinal cord stimulator surgery complain of pain and discomfort at the incisional sites  During the surgery, local anesthetic will be used at the site of the incisions and the pain relief effect should last about 6 hours  It is not uncommon to have incisional pain or discomfort later that evening and especially the day after surgery A postoperative pain medication plan will be discussed at the preoperative appointment  In the post-operative period, heat or ice packs may be used as  necessary for incisional discomfort and swelling  However, it is important to understand that post-op pain medications will not take 100% of your pain away, and it is common to still feel discomfort at the incisional sites while taking pain medication  Activity  Unless you have been instructed otherwise, you should focus on gentle walking the first 2 weeks after the surgery  You should start with brief walks in the house, and gradually increase the time and speed of your walks  It is best to limit stair walking to 1 or 2 times a day for the first week  As you feel better, you should start to take longer walks outside and up inclines  You should avoid driving or being in the car for the first 2 weeks  After that, start with short drives with another person in the car  You should avoid lifting anything heavier than a half gallon of milk for the first 2 weeks  After that, you can start to lift light objects if you are comfortable  Remember-if it hurts, dont do it! Sexual activity can be resumed when you feel comfortable   You can discuss returning to an exercise regimen with your physical therapist  For most patients, working with a physical therapist after the surgery can help with the recovery process  Diet and medication  You can resume your regular diet immediately after the surgery  Your regular medications may be restarted right away  However, aspirin is usually started the day after surgery unless otherwise  instructed by your surgeon  If you are taking an anticoagulant or blood thinner such as warfarin (coumadin), plavix (clopidogrel), pradaxa (dabigatran), or any other anticoagulant medication,  you will be told when to restart the medication  You should then follow-up with the doctor who prescribes the anticoagulant medicine  Constipation is a common problem after spine surgery  Over the counter stimulants and stool softeners can be beneficial, along with plenty of fresh water  Work  Your return to work will be discussed in your office  follow up appointment  Follow up Appointments  Your first  surgical post-op appointment will be 2 weeks after surgery  What to Watch Out For  These symptoms should cause you to call immediately or dial 911  to come to the emergency department   Paralysis or inability to fully move your legs   Severe chest pain, difficulty breathing   Loss of control of your bowels and bladder  The following symptoms may indicate a problem  You should call the office number listed below      Fever higher than 101 F Increasing back and/or leg pain   Difficulty passing urine   New numbness or change in symptoms from before surgery   Redness or drainage from the incision   Unusual headache, especially if it is much worse when you  stand up

## 2022-08-15 NOTE — TELEPHONE ENCOUNTER
08/15/2022-PT STILL IN [facility]  08/29/2022-2 WK POV W/NURSE  09/26/2022-6 WK POV W/MEGAN

## 2022-08-15 NOTE — ANESTHESIA PREPROCEDURE EVALUATION
Procedure:  Thoracic laminectomies for placement of spinal cord stimulator lead and internal pulse generator, left-sided pulse generator, with neuro monitoring (Left Spine Thoracic)    Relevant Problems   CARDIO   (+) Primary hypertension      GI/HEPATIC   (+) Bariatric surgery status      MUSCULOSKELETAL   (+) Chronic low back pain with left-sided sciatica   (+) Fibromyalgia   (+) Piriformis syndrome of left side   (+) Piriformis syndrome of right side   (+) Sacroiliitis (HCC)      NEURO/PSYCH   (+) Anxiety   (+) Chronic low back pain with left-sided sciatica   (+) Chronic pain syndrome   (+) Fibromyalgia   (+) Moderate episode of recurrent major depressive disorder (HCC)      PULMONARY   (+) Sleep apnea      Other   (+) CPAP (continuous positive airway pressure) dependence   (+) H/O gastric bypass   (+) Obesity, Class II, BMI 79-68 0   (+) On tricyclic antidepressant therapy        Physical Exam    Airway    Mallampati score: II  TM Distance: >3 FB  Neck ROM: full     Dental   No notable dental hx     Cardiovascular      Pulmonary      Other Findings        Anesthesia Plan  ASA Score- 3     Anesthesia Type- general with ASA Monitors  Additional Monitors:   Airway Plan: ETT  Comment: NPO, off doxepin for several months  Plan Factors-    Chart reviewed  Existing labs reviewed  Patient summary reviewed  Patient is not a current smoker  Induction- intravenous  Postoperative Plan- Plan for postoperative opioid use  Informed Consent- Anesthetic plan and risks discussed with patient  I personally reviewed this patient with the CRNA  Discussed and agreed on the Anesthesia Plan with the CRNA  Rosa Kaplan

## 2022-08-15 NOTE — OP NOTE
OPERATIVE REPORT  PATIENT NAME: Karl Story    :  1965  MRN: [id number]  Pt Location: [facility] OR ROOM 03    SURGERY DATE: 8/15/2022    Surgeon(s) and Role:     * Senthil Corley MD - Primary     * Johanna Huang PA-C - Assisting    Preop Diagnosis:  Spinal stenosis of lumbar region with neurogenic claudication [M48 062]  Lumbar radiculopathy [M54 16]  Chronic pain syndrome [G89 4]    Post-Op Diagnosis Codes:     * Spinal stenosis of lumbar region with neurogenic claudication [M48 062]     * Lumbar radiculopathy [M54 16]     * Chronic pain syndrome [G89 4]    Procedures:  1) T10-11 laminectomies for placement of Nevro spinal cord stimulator  2) Placement of pulse generator in the left sided paraspinal region  3) Intraoperative programming  4) Use of neuromonitoring  5) Use of fluoroscopy <1hr    Specimen(s):  * No specimens in log *    Estimated Blood Loss:   Minimal    Drains:  * No LDAs found *    Anesthesia Type:   General    Operative Indications:  Spinal stenosis of lumbar region with neurogenic claudication [M48 062]  Lumbar radiculopathy [M54 16]  Chronic pain syndrome [G89 4]    Operative Findings:  Stimulator placed without any issues    Complications:   None    Procedure and Technique:  After obtaining written informed consent, the patient was brought to the operating room  General endotracheal anesthesia was induced  Neuro monitoring was then placed  The patient was flipped into prone position unto a Davin frame  Incision was marked using fluoroscopy  The patient was then given  perioperative antibiotic  The patient was prepped and draped in the usual sterile fashion  Lidocaine with epinephrine was injected in the surgical site  Surgical time-out was performed  A ten blade was then used to open a midline thoracic incision at the T10-11 level  Monopolar cautery was then used to dissect down through the dorsal fascia to the spinous process of T10-11   Dissection was carried in a subperiosteal manner over the lamina to the medial facet joints  The spinous processes of T10 was partially removed  Using a high speed drill, the inferior lamina of T10 was drilled down to the ligamentum flavum  The rostral lamina of  T11 was drilled and flattened  Using Kerrison rongeurs, the ligamentum flavum was removed and a partial inferior laminectomy was performed on T10  A paddle template was placed in the epidural space and tunneled rostrally  Mild adhesions were encountered in the epidural space  The paddle stimulator was then placed in the created path and tunneled rostrally  Fluoroscopy was used to confirm placement and the rostrum of the paddle was at the bottom of T8  We then tested the stimulator with the pulse generator and we had good signals throughout the lower extremities  The electrodes were then screwed unto the rostral lamina of T11 using a dogbone and screws  The electrode was then coiled and secured into the paraspinal muscles using 0 silk sutures  A separate incision was marked in the left paraspinal region  Lidocaine with epinephrine was injected  Dissection was carried to the subcutaneous fascia  A pocket was made large enough to fit the pulse generator  Hemostasis was obtained using monopolar cautery  The electrode was then tunneled from the spinal region to the pocket  The pulse generator was then connected to the electrode and secured with the built in screw  The generator was placed into the pocket and had the appropriate fit  0 silk sutures were used to secure the generator into the fascia underneath the pocket  Impedances were checked and were satisfactory  Stimulation was felt in the bilateral lower extremities  Programming was then performed  We then proceeded to closing  Both wounds were copiously irrigated with antibiotic saline solution  At the spine, the muscle and fascia were closed with 0 Vicryl   The deep dermal layer was closed with inverted 2-0 Vicryl sutures  2-0 prolenes were then placed in a vertical mattress fashion  In the paraspinal region, 2-0 vicryl sutures were placed in the deep dermal layer  The skin was closed with a 4-0 stratafix suture in a subcuticular fashion  Steri strips were then placed over the incision followed by Bacitracin ointment  Sterile dressing was applied to both incisions  Surgical sign-out was performed  No qualified resident was available  Jacoby Hernandez PA-C was present for the procedure, and provided essential assistance with proper exposure, retraction, hemostasis, stimulator placement, and wound closure, which was necessary secondary to the complex nature of this case        I was present for the entire procedure    Patient Disposition:  PACU       SIGNATURE: Taryn Blanco MD  DATE: August 15, 2022  TIME: 10:48 AM

## 2022-08-15 NOTE — H&P
H&P reviewed  After examining the patient I find no changes in the patients condition since the H&P had been written  Patient personally seen and examined  Neurological examination unchanged compared to last office/progress note, with the following exceptions:    /78   Pulse 80   Temp (!) 97 °F (36 1 °C) (Temporal)   Resp 16   Ht 5' 1" (1 549 m)   Wt 88 5 kg (195 lb 3 2 oz)   SpO2 98%   BMI 36 88 kg/m²        Post operative instructions and medications have been reviewed with the patient and family  Assessment and Plan:    All questions have been answered to the patient and family satisfaction  Plan to proceed with Thoracic spinal cord stimulator placement, left IPG  They are in agreement with proceeding

## 2022-08-16 DIAGNOSIS — I10 PRIMARY HYPERTENSION: Primary | ICD-10-CM

## 2022-08-16 PROCEDURE — 93000 ELECTROCARDIOGRAM COMPLETE: CPT

## 2022-08-16 NOTE — TELEPHONE ENCOUNTER
Called patient to see how she is doing after surgery  Patient reports she is doing well overall and denies any incisional issues or fevers  Patient is able to ambulate around the house and complete ADLs  Educated the patient about the importance of preventing blood clots and provided measures how to prevent them  Patient has not moved her bowels since the surgery  Encouraged patient to take an over the counter stool softener, if she is taking narcotic pain medication  Encouraged fiber intake and fluids  Reviewed incision care with the patient  Advised that after three days she may take a shower and gently wash the surgical site with soap and water  Use clean wash cloth, towels, and clothing  Do not submerge in water until cleared by the surgeon  Do not apply any creams, ointments, or lotions to the site  Patient is aware to call the office if any redness, swelling, drainage, dehiscence of incision, or fever >100 F occurs  Patient inquired about using ice  Advised she may use heat/ice pack as long as she avoids direct contact to the incision site  Patient is aware to call the office if any concerns or questions may arise  Reminded patient of her upcoming appointments with the date/time/location  Patient was appreciative for the call

## 2022-08-17 ENCOUNTER — DOCUMENTATION (OUTPATIENT)
Dept: NEUROSURGERY | Facility: CLINIC | Age: 57
End: 2022-08-17

## 2022-08-17 DIAGNOSIS — Z96.89 S/P INSERTION OF SPINAL CORD STIMULATOR: ICD-10-CM

## 2022-08-17 DIAGNOSIS — M62.838 MUSCLE SPASM: Primary | ICD-10-CM

## 2022-08-17 RX ORDER — OXYCODONE HYDROCHLORIDE 5 MG/1
5 TABLET ORAL EVERY 4 HOURS PRN
Qty: 30 TABLET | Refills: 0 | Status: SHIPPED | OUTPATIENT
Start: 2022-08-17 | End: 2022-10-06 | Stop reason: ALTCHOICE

## 2022-08-17 RX ORDER — METHOCARBAMOL 750 MG/1
750 TABLET, FILM COATED ORAL EVERY 6 HOURS PRN
Qty: 28 TABLET | Refills: 0 | Status: SHIPPED | OUTPATIENT
Start: 2022-08-17

## 2022-08-17 RX ORDER — OXYCODONE HYDROCHLORIDE 5 MG/1
5 TABLET ORAL EVERY 4 HOURS PRN
Qty: 10 TABLET | Refills: 0 | Status: CANCELLED | OUTPATIENT
Start: 2022-08-17 | End: 2022-08-27

## 2022-08-17 NOTE — TELEPHONE ENCOUNTER
Received a call on the nurse line from Zack who sounded like she was experiencing quite a bit of pain  She is 2 days s/p: spinal cord stimulator and IPG insertion by Dr Marco Rodriguez  She wanted to know if there is anything that can be done for the amount of pain she is experiencing  She was discharged with oxycodone and an antibiotic but the pain she is experiencing sounds like severe muscle spasm from her back and wraps around into her rib area  She denies any SOB and no pain with breathing  Advised patient, will discuss with AP about prescribing her a muscle relaxer and she also asked if she could have a refill of her oxycodone  She was discharged with 10 tablets  I have attached her PDMP to her chart in a separate note  Thanks!

## 2022-08-17 NOTE — TELEPHONE ENCOUNTER
Medication Refill Request     Name oxyCODONE (Roxicodone) 5 immediate release tablet  Dose/Frequency Take 1 tablet (5 mg total) by mouth every 4 (four) hours as needed for moderate pain for up to 10 days  Quantity 10 tablet  Verified pharmacy   [x]  Verified ordering Provider   [x]  Does patient have enough for the next 3 days?  Yes [] No [x]

## 2022-08-18 ENCOUNTER — TELEPHONE (OUTPATIENT)
Dept: NEUROSURGERY | Facility: CLINIC | Age: 57
End: 2022-08-18

## 2022-08-18 NOTE — TELEPHONE ENCOUNTER
Patient called the office questioning if she is able to take a shower today  Returned call to patient and advised she is able to shower today  Provided appropriate instructions for pre and post shower  Patient stated an understanding and was appreciative of the call back

## 2022-08-24 ENCOUNTER — TELEPHONE (OUTPATIENT)
Dept: NEUROSURGERY | Facility: CLINIC | Age: 57
End: 2022-08-24

## 2022-08-24 NOTE — TELEPHONE ENCOUNTER
Received a call from Zack asking if it would be ok to apply a band-aid over her suturing  She states she thinks the end of suturing got caught on her bedding  She noted a little blood on her sheets but it not bleeding, or drainage  There are no open areas  Advised that covering the incision is not preferred however should be ok to cover loosely with gauze of band-aid at  to protect the area  Also moved post op to [employer] as hse lives in [place] and does not like to drive

## 2022-08-29 ENCOUNTER — CLINICAL SUPPORT (OUTPATIENT)
Dept: NEUROSURGERY | Facility: CLINIC | Age: 57
End: 2022-08-29

## 2022-08-29 VITALS
WEIGHT: 195 LBS | OXYGEN SATURATION: 98 % | SYSTOLIC BLOOD PRESSURE: 138 MMHG | HEART RATE: 78 BPM | RESPIRATION RATE: 16 BRPM | TEMPERATURE: 97.6 F | DIASTOLIC BLOOD PRESSURE: 90 MMHG | HEIGHT: 61 IN | BODY MASS INDEX: 36.82 KG/M2

## 2022-08-29 DIAGNOSIS — Z98.890 STATUS POST SURGERY: Primary | ICD-10-CM

## 2022-08-29 PROCEDURE — 99024 POSTOP FOLLOW-UP VISIT: CPT

## 2022-08-29 NOTE — PROGRESS NOTES
Post-Op Visit-Neurosurgery    Jada Lindo 62 y o  female MRN: [id number]    Chief Complaint:  Patient presents post: Thoracic laminectomies for placement of spinal cord stimulator lead and internal pulse generator, left-sided pulse generator, with neuro monitoring - Left    History of Present Illness:  Patient presents for 2 week POV for incision check accompanied by cousin and ambulating without an assistive device  Patient reports she is doing well overall and denies any incisional issues or fevers  She denies any new weakness, numbness or tingling since the surgery and denies any new issues with her bowel or bladder  Patient admits to much improved surgical pain at this time and rates their pain as a 1/10  Patient is currently taking tylenol as needed with complete resolution of pain symptoms  Assessment:  Vitals:    08/29/22 1420   BP: 138/90   Pulse: 78   Resp: 16   Temp: 97 6 °F (36 4 °C)   SpO2: 98%   Weight: 88 5 kg (195 lb)   Height: 5' 1" (1 549 m)        Wound Exam: Incisions well approximated  Mild erythema surrounding both incisons, no edema or drainage present  Location: mid back and left flank  Procedure: removed one prolene suture left in thoracic incision  Stated that her son who is a CNA removed the others because she "couldn't take it anymore"  Complications: None  Incisions LEONID  Discussion/Summary:  Reviewed incision care with patient including daily observation for s/s infection including: increased erythema, edema, drainage, dehiscence of incision or fever >101  Should these be observed, she understands that she is to call and/or return immediately for reassessment  Advised patient to continue cleansing area with mild soap and water and pat dry  Not to apply any lotions, creams, or ointments, & not to submerge in any water for two more weeks   She is to maintain activity restrictions until cleared by the surgeon  Activity levels were also reviewed with the patient in detail, she is to lift no greater than 10 pounds, advised to limit bend/twisting at the waist and ambulation is encouraged as tolerated  Verified date/time/location of upcoming POV and reminded her to call the office with any further questions or concerns, or if any incisional issues or fevers would arise  Patient will be meeting with the SCS rep after today's visit for programming of the stimulator

## 2022-08-29 NOTE — PATIENT INSTRUCTIONS
Continue incision care as instructed  Maintain activity restrictions until cleared by the surgeon  Call the office immediately with any signs or symptoms of infection including: increasing redness, swelling, drainage or fevers (101 or greater)

## 2022-08-31 ENCOUNTER — TELEPHONE (OUTPATIENT)
Dept: PSYCHIATRY | Facility: CLINIC | Age: 57
End: 2022-08-31

## 2022-09-15 ENCOUNTER — DOCUMENTATION (OUTPATIENT)
Dept: BEHAVIORAL/MENTAL HEALTH CLINIC | Facility: CLINIC | Age: 57
End: 2022-09-15

## 2022-09-15 ENCOUNTER — SOCIAL WORK (OUTPATIENT)
Dept: BEHAVIORAL/MENTAL HEALTH CLINIC | Facility: CLINIC | Age: 57
End: 2022-09-15

## 2022-09-15 DIAGNOSIS — F33.1 MODERATE EPISODE OF RECURRENT MAJOR DEPRESSIVE DISORDER (HCC): Primary | ICD-10-CM

## 2022-09-15 NOTE — PROGRESS NOTES
[facility]    Patient Name Rene Cuellar     Date of Birth: 62 y o  1965      MRN: [id number]    Admission Date: 12/30/21 for therapy with this     Date of Transfer: September 15, 2022    Admission Diagnosis:     1  Major Depressive Disorder, recurrent, moderate    Current Diagnosis:     No diagnosis found  Reason for Admission: Luis Ricks presented for treatment due to depression and anxiety symptoms  Primary complaints included DEPRESSIVE SYMPTOMS: depressed mood, sadness, low energy, decreased interest, passive death wish and ANXIETY SYMPTOMS: daily anxiety symptoms, feeling nervous, feeling agitated, racing thoughts  Luis Ricks lives with chronic acute pain and was recently treated with SCS, which trial was successful reportedly and planned to have it permanently  Progress in Treatment: Luis Ricks was seen for Cognitive Behavioral Therapy, Dialectical Behavioral Therapy, [facility] and Trauma Focused Cognitive Behavioral Therapy  During the course of treatment she was agreeable to work on improvement and made attempts to change thoughts and to act opposite of symptoms as much as she could given the ongoing pain she lives with  Episodes of Higher Level of Care: No    Transfer request Initiated by: Psychiatrist: Dr Amairani Mckeon Therapist: Ger Zamora    Reason for Transfer Request: insurance change- this  cannot see Pt when she gets Medicare     Does this individual need a clinician with specialized training/expertise?: No    Is this client working with any other [facility] Providers/Therapists?  Psychiatrist: Dr Amairani Mckeon Therapist: Ger Zamora    Other pertinent issues: Major Depressive Disorder and Anxiety Disorder    Are there any specific individuals who would be a best fit or who have already agreed to accept this transfer request?      Psychiatrist: Dr Kiko Crowder and Dr Amairani Mckeon   Therapist: Damaris Rodriguez Dashawn  Rationale: LPC cannot see Pt when she gets Medicare from Oct    Attempts to maintain the current therapeutic relationship: Not Applicable    Transfer request routed to Clinical Supervisor for input and/or approval      Comments from other involved providers and/or clinical coordinator: Not Applicable    Chuck Boone, [address]

## 2022-09-19 ENCOUNTER — TELEPHONE (OUTPATIENT)
Dept: BEHAVIORAL/MENTAL HEALTH CLINIC | Facility: CLINIC | Age: 57
End: 2022-09-19

## 2022-09-19 NOTE — TELEPHONE ENCOUNTER
NO-SHOW LETTER MAILED TO Bob Norris    ADDRESS: [address] [place] [phone]

## 2022-09-24 ENCOUNTER — OFFICE VISIT (OUTPATIENT)
Dept: URGENT CARE | Facility: CLINIC | Age: 57
End: 2022-09-24
Payer: COMMERCIAL

## 2022-09-24 VITALS
RESPIRATION RATE: 15 BRPM | TEMPERATURE: 97.8 F | HEIGHT: 61 IN | BODY MASS INDEX: 36.82 KG/M2 | SYSTOLIC BLOOD PRESSURE: 167 MMHG | HEART RATE: 98 BPM | DIASTOLIC BLOOD PRESSURE: 82 MMHG | OXYGEN SATURATION: 98 % | WEIGHT: 195 LBS

## 2022-09-24 DIAGNOSIS — S56.912A FOREARM STRAIN, LEFT, INITIAL ENCOUNTER: Primary | ICD-10-CM

## 2022-09-24 PROCEDURE — 99213 OFFICE O/P EST LOW 20 MIN: CPT | Performed by: FAMILY MEDICINE

## 2022-09-24 RX ORDER — METHYLPREDNISOLONE 4 MG/1
TABLET ORAL
COMMUNITY
Start: 2022-09-20

## 2022-09-24 NOTE — PROGRESS NOTES
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  NSAIDs sent to pharmacy  Follow up with PCP in 3-5 days if no improvement  Proceed to  ER if symptoms worsen  Chief Complaint     Chief Complaint   Patient presents with    Arm Pain     Pt is not sure what she did to her left forearm  She stated the only thing she can think of she was doing crafts and drilling  History of Present Illness       51-year-old female presents today complaining of left forearm pain  She states that she has been building Halloween decorations, as well as changing the brakes on her truck  She states that this morning she woke up with pain in her forearm  She denies any specific injury  She denies any soap a fall  She states that the pain is located on the dorsal aspect of her forearm along the extensor muscles  She presents today for further evaluation  Review of Systems   Review of Systems   Constitutional: Negative for chills, fatigue and fever  HENT: Negative for postnasal drip and sore throat  Respiratory: Negative for cough and shortness of breath  Cardiovascular: Negative for chest pain and palpitations  Gastrointestinal: Negative for abdominal pain, nausea and vomiting  Genitourinary: Negative for dysuria  Musculoskeletal: Positive for myalgias  Negative for gait problem and joint swelling  Skin: Negative for rash  Neurological: Negative for dizziness, syncope, light-headedness, numbness and headaches  Psychiatric/Behavioral: Negative for agitation and confusion     All other systems reviewed and are negative          Current Medications       Current Outpatient Medications:     ascorbic acid (VITAMIN C) 500 MG tablet, Take 1 tablet (500 mg total) by mouth daily, Disp: 90 tablet, Rfl: 1    baclofen 20 mg tablet, Take 1 tablet (20 mg total) by mouth 2 (two) times a day as needed for muscle spasms, Disp: 180 tablet, Rfl: 1    Diclofenac Sodium (VOLTAREN) 1 %, Apply 2 g topically 4 (four) times a day, Disp: 150 g, Rfl: 0    diclofenac sodium (VOLTAREN) 50 mg EC tablet, Take 1 tablet (50 mg total) by mouth 2 (two) times a day, Disp: 60 tablet, Rfl: 0    doxepin (SINEquan) 150 MG capsule, Take 1 capsule (150 mg total) by mouth daily at bedtime, Disp: 90 capsule, Rfl: 1    ergocalciferol (VITAMIN D2) 50,000 units, TAKE 1 CAPSULE (50,000 UNITS TOTAL) BY MOUTH EVERY 28 DAYS, Disp: 3 capsule, Rfl: 0    gabapentin (NEURONTIN) 300 mg capsule, Take 1 capsule (300 mg total) by mouth 3 (three) times a day In addition to 600 mg TID for a total of 900 mg TID, Disp: 270 capsule, Rfl: 1    gabapentin (Neurontin) 600 MG tablet, Take 1 tablet (600 mg total) by mouth 3 (three) times a day, Disp: 270 tablet, Rfl: 1    methocarbamol (ROBAXIN) 750 mg tablet, Take 1 tablet (750 mg total) by mouth every 6 (six) hours as needed for muscle spasms, Disp: 28 tablet, Rfl: 0    methylPREDNISolone 4 MG tablet therapy pack, TAKE 1 DOSE PACK BY MOUTH AS DIRECTED, Disp: , Rfl:     oxyCODONE (Roxicodone) 5 immediate release tablet, Take 1 tablet (5 mg total) by mouth every 4 (four) hours as needed for severe pain Max Daily Amount: 30 mg, Disp: 30 tablet, Rfl: 0    valsartan (DIOVAN) 160 mg tablet, TAKE 1 TABLET BY MOUTH EVERY DAY, Disp: 30 tablet, Rfl: 5    vitamin B-12 (VITAMIN B-12) 500 mcg tablet, Take 1 tablet (500 mcg total) by mouth daily, Disp: 90 tablet, Rfl: 1    Current Allergies     Allergies as of 09/24/2022    (No Known Allergies)            The following portions of the patient's history were reviewed and updated as appropriate: allergies, current medications, past family history, past medical history, past social history, past surgical history and problem list      Past Medical History:   Diagnosis Date    Anemia     hx of iron    Anxiety     Chronic pain disorder     back radiates down legs    Colon polyp     COVID 2020    CPAP (continuous positive airway pressure) dependence     Fibromyalgia     Fibromyalgia, primary     Hypertension     Lumbar radiculopathy     Medical history unknown     Osteopenia     Sleep apnea     Spinal stenosis     Staph infection     fingers years ago    Use of cane as ambulatory aid        Past Surgical History:   Procedure Laterality Date    CARPAL TUNNEL RELEASE Bilateral     21 y o  with left ulnar nerve release   CARPAL TUNNEL RELEASE Bilateral      SECTION      COLONOSCOPY      ELBOW SURGERY      Tennis elbow    GASTRIC BYPASS      [place] SURG IMPLNT Elvia Dillon Left 8/15/2022    Procedure: Thoracic laminectomies for placement of spinal cord stimulator lead and internal pulse generator, left-sided pulse generator, with neuro monitoring;  Surgeon: Iona Fleming MD;  Location:  MAIN OR;  Service: Neurosurgery    REDUCTION MAMMAPLASTY Bilateral     WISDOM TOOTH EXTRACTION         Family History   Problem Relation Age of Onset    Ovarian cancer Mother     Heart disease Mother     Colon cancer Father     No Known Problems Maternal Grandmother     No Known Problems Maternal Grandfather     Diabetes Paternal Grandmother     No Known Problems Paternal Grandfather     No Known Problems Son     Leukemia Maternal Uncle     Diabetes Paternal Aunt     Diabetes Paternal Uncle          Medications have been verified  Objective   /82   Pulse 98   Temp 97 8 °F (36 6 °C)   Resp 15   Ht 5' 1" (1 549 m)   Wt 88 5 kg (195 lb)   SpO2 98%   BMI 36 84 kg/m²   No LMP recorded   Patient is postmenopausal        Physical Exam     Physical Exam  Vitals reviewed  Constitutional:       General: She is not in acute distress  Appearance: Normal appearance  She is not ill-appearing  HENT:      Head: Normocephalic and atraumatic  Eyes:      Extraocular Movements: Extraocular movements intact  Conjunctiva/sclera: Conjunctivae normal    Musculoskeletal:         General: No signs of injury  Left forearm: Tenderness present  No swelling or edema  Arms:       Cervical back: Normal range of motion  Skin:     General: Skin is warm  Neurological:      General: No focal deficit present  Mental Status: She is alert     Psychiatric:         Mood and Affect: Mood normal          Behavior: Behavior normal          Judgment: Judgment normal

## 2022-10-06 ENCOUNTER — OFFICE VISIT (OUTPATIENT)
Dept: NEUROSURGERY | Facility: CLINIC | Age: 57
End: 2022-10-06

## 2022-10-06 VITALS
DIASTOLIC BLOOD PRESSURE: 84 MMHG | HEART RATE: 74 BPM | RESPIRATION RATE: 16 BRPM | SYSTOLIC BLOOD PRESSURE: 128 MMHG | WEIGHT: 195 LBS | BODY MASS INDEX: 36.82 KG/M2 | HEIGHT: 61 IN

## 2022-10-06 DIAGNOSIS — Z96.89 S/P INSERTION OF SPINAL CORD STIMULATOR: Primary | ICD-10-CM

## 2022-10-06 DIAGNOSIS — Z09 FOLLOW-UP EXAMINATION, FOLLOWING OTHER SURGERY: ICD-10-CM

## 2022-10-06 PROCEDURE — 99024 POSTOP FOLLOW-UP VISIT: CPT | Performed by: STUDENT IN AN ORGANIZED HEALTH CARE EDUCATION/TRAINING PROGRAM

## 2022-10-06 NOTE — PROGRESS NOTES
Office Note - Neurosurgery   Nela Freedman 62 y o  female MRN: [id number]      Assessment and Plan: This is a 66-year-old female status post thoracic spinal cord stimulator placement presenting for her 6 week postsurgical follow-up visit  The patient preoperative symptoms have significantly improved with the stimulator  She is doing very well from the spine standpoint  We are both very happy with her surgical results  From the neurosurgical standpoint, the patient does not require any further intervention  I advised the patient to call me with any issues  I will see her on a p r n  basis  History, physical examination and diagnostic tests were reviewed and questions answered  Diagnosis, care plan and treatment options were discussed  The patient understand instructions and will follow up as directed  Follow-up: PRN    Diagnoses and all orders for this visit:    S/P insertion of spinal cord stimulator    Follow-up examination, following other surgery        Subjective/Objective     Chief Complaint    Week postsurgical follow-up visit    HPI    This is a 66-year-old female status post thoracic spinal cord stimulator placement for low back pain and bilateral lower extremity pain  The patient states she is doing very well  Since the stimulator placement, she has had about 75% or greater symptom relief  She states the back pain is significantly tolerable and now she is able to go to the stores without needing to leave due to pain  She states she has been very active and low back and bilateral lower extremity pain is not a big issue for her currently  She has been having a right Achilles tendon issue which is being managed by the podiatrist   Overall she is very happy with her surgical results  MELISSA TORRES personally reviewed and updated  Review of Systems   Constitutional: Negative  HENT: Negative  Eyes: Negative  Respiratory: Negative  Cardiovascular: Negative  Gastrointestinal: Negative  Endocrine: Negative  Genitourinary: Negative  Musculoskeletal: Positive for back pain (LBP radiating bi/hips & buttock right worse than left,  improving) and gait problem (walking in boot for foot, trouble walking)  Skin: Negative  Allergic/Immunologic: Negative  Neurological: Positive for weakness (bi/legs, right is worse) and numbness (right foot numbness)  Fibromyalgia   Hematological: Negative  Psychiatric/Behavioral: Negative  Family History    Family History   Problem Relation Age of Onset    Ovarian cancer Mother     Heart disease Mother     Colon cancer Father     No Known Problems Maternal Grandmother     No Known Problems Maternal Grandfather     Diabetes Paternal Grandmother     No Known Problems Paternal Grandfather     No Known Problems Son     Leukemia Maternal Uncle     Diabetes Paternal Aunt     Diabetes Paternal Uncle        Social History    Social History     Socioeconomic History    Marital status:       Spouse name: Not on file    Number of children: Not on file    Years of education: Not on file    Highest education level: Not on file   Occupational History    Not on file   Tobacco Use    Smoking status: Never Smoker    Smokeless tobacco: Never Used   Vaping Use    Vaping Use: Never used   Substance and Sexual Activity    Alcohol use: Yes     Comment: a glass of wine every now & then    Drug use: Never    Sexual activity: Not on file   Other Topics Concern    Not on file   Social History Narrative    Not on file     Social Determinants of Health     Financial Resource Strain: Not on file   Food Insecurity: Not on file   Transportation Needs: Not on file   Physical Activity: Not on file   Stress: Not on file   Social Connections: Not on file   Intimate Partner Violence: Not on file   Housing Stability: Not on file       Past Medical History    Past Medical History:   Diagnosis Date    Anemia     hx of iron    Anxiety     Chronic pain disorder     back radiates down legs    Colon polyp     COVID 2020    CPAP (continuous positive airway pressure) dependence     Fibromyalgia     Fibromyalgia, primary     Hypertension     Lumbar radiculopathy     Medical history unknown     Osteopenia     Sleep apnea     Spinal stenosis     Staph infection     fingers years ago    Use of cane as ambulatory aid        Surgical History    Past Surgical History:   Procedure Laterality Date    CARPAL TUNNEL RELEASE Bilateral     21 y o  with left ulnar nerve release        CARPAL TUNNEL RELEASE Bilateral      SECTION      COLONOSCOPY      ELBOW SURGERY      Tennis elbow    GASTRIC BYPASS      [place] SURG IMPLNT Sharene Radon Left 8/15/2022    Procedure: Thoracic laminectomies for placement of spinal cord stimulator lead and internal pulse generator, left-sided pulse generator, with neuro monitoring;  Surgeon: Taryn Blanco MD;  Location:  MAIN OR;  Service: Neurosurgery    REDUCTION MAMMAPLASTY Bilateral     WISDOM TOOTH EXTRACTION         Medications      Current Outpatient Medications:     ascorbic acid (VITAMIN C) 500 MG tablet, Take 1 tablet (500 mg total) by mouth daily, Disp: 90 tablet, Rfl: 1    baclofen 20 mg tablet, Take 1 tablet (20 mg total) by mouth 2 (two) times a day as needed for muscle spasms, Disp: 180 tablet, Rfl: 1    Diclofenac Sodium (VOLTAREN) 1 %, Apply 2 g topically 4 (four) times a day, Disp: 150 g, Rfl: 0    diclofenac sodium (VOLTAREN) 50 mg EC tablet, Take 1 tablet (50 mg total) by mouth 2 (two) times a day, Disp: 60 tablet, Rfl: 0    doxepin (SINEquan) 150 MG capsule, Take 1 capsule (150 mg total) by mouth daily at bedtime, Disp: 90 capsule, Rfl: 1    ergocalciferol (VITAMIN D2) 50,000 units, TAKE 1 CAPSULE (50,000 UNITS TOTAL) BY MOUTH EVERY 28 DAYS, Disp: 3 capsule, Rfl: 0    gabapentin (NEURONTIN) 300 mg capsule, Take 1 capsule (300 mg total) by mouth 3 (three) times a day In addition to 600 mg TID for a total of 900 mg TID, Disp: 270 capsule, Rfl: 1    gabapentin (Neurontin) 600 MG tablet, Take 1 tablet (600 mg total) by mouth 3 (three) times a day, Disp: 270 tablet, Rfl: 1    methocarbamol (ROBAXIN) 750 mg tablet, Take 1 tablet (750 mg total) by mouth every 6 (six) hours as needed for muscle spasms, Disp: 28 tablet, Rfl: 0    methylPREDNISolone 4 MG tablet therapy pack, TAKE 1 DOSE PACK BY MOUTH AS DIRECTED, Disp: , Rfl:     oxyCODONE (Roxicodone) 5 immediate release tablet, Take 1 tablet (5 mg total) by mouth every 4 (four) hours as needed for severe pain Max Daily Amount: 30 mg, Disp: 30 tablet, Rfl: 0    valsartan (DIOVAN) 160 mg tablet, TAKE 1 TABLET BY MOUTH EVERY DAY, Disp: 30 tablet, Rfl: 5    vitamin B-12 (VITAMIN B-12) 500 mcg tablet, Take 1 tablet (500 mcg total) by mouth daily, Disp: 90 tablet, Rfl: 1    Allergies    No Known Allergies    The following portions of the patient's history were reviewed and updated as appropriate: allergies, current medications, past family history, past medical history, past social history, past surgical history and problem list       Physical Exam    Vitals: There were no vitals taken for this visit  ,There is no height or weight on file to calculate BMI      General:  Normal, well developed, not in distress/pain     Skin:   No issues, no rashes noted     Musculoskeletal:   5/5 strength throughout all muscle groups  No tenderness to palpation of the spine       Neurologic Exam:  Awake and alert  Oriented x3  Speech clear and fluent  LU   Sensation to light touch and pin prick intact throughout  No lambert's  No clonus  2+ patellar reflexes     Gait:   normal gait, normal posture

## 2022-10-12 ENCOUNTER — DOCUMENTATION (OUTPATIENT)
Dept: PSYCHIATRY | Facility: CLINIC | Age: 57
End: 2022-10-12
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
## 2022-10-17 ENCOUNTER — TELEPHONE (OUTPATIENT)
Dept: PSYCHIATRY | Facility: CLINIC | Age: 57
End: 2022-10-17

## 2022-10-17 NOTE — TELEPHONE ENCOUNTER
DISCHARGE LETTER for Dr Fior Angel (certified and regular) placed in outgoing mail on 10/17/22      Article #:  [id number]    Address:  [address] [id number]

## 2022-10-18 ENCOUNTER — TELEPHONE (OUTPATIENT)
Dept: NEUROSURGERY | Facility: CLINIC | Age: 57
End: 2022-10-18

## 2022-10-18 DIAGNOSIS — G89.4 CHRONIC PAIN SYNDROME: ICD-10-CM

## 2022-10-18 RX ORDER — LORATADINE 10 MG
TABLET ORAL
Qty: 90 TABLET | Refills: 2 | Status: SHIPPED | OUTPATIENT
Start: 2022-10-18
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
## 2022-11-03 ENCOUNTER — APPOINTMENT (OUTPATIENT)
Dept: RADIOLOGY | Facility: CLINIC | Age: 57
End: 2022-11-03

## 2022-11-03 ENCOUNTER — OFFICE VISIT (OUTPATIENT)
Dept: FAMILY MEDICINE CLINIC | Facility: CLINIC | Age: 57
End: 2022-11-03

## 2022-11-03 VITALS
OXYGEN SATURATION: 97 % | DIASTOLIC BLOOD PRESSURE: 82 MMHG | WEIGHT: 193 LBS | RESPIRATION RATE: 16 BRPM | SYSTOLIC BLOOD PRESSURE: 130 MMHG | BODY MASS INDEX: 36.44 KG/M2 | HEART RATE: 96 BPM | HEIGHT: 61 IN

## 2022-11-03 DIAGNOSIS — Z23 NEED FOR VACCINATION: ICD-10-CM

## 2022-11-03 DIAGNOSIS — M92.61 HAGLUND'S DEFORMITY, RIGHT: ICD-10-CM

## 2022-11-03 DIAGNOSIS — G47.33 OSA (OBSTRUCTIVE SLEEP APNEA): ICD-10-CM

## 2022-11-03 DIAGNOSIS — I10 PRIMARY HYPERTENSION: ICD-10-CM

## 2022-11-03 DIAGNOSIS — Z79.899 OTHER LONG TERM (CURRENT) DRUG THERAPY: ICD-10-CM

## 2022-11-03 DIAGNOSIS — Z01.818 PRE-OP EXAM: Primary | ICD-10-CM

## 2022-11-03 DIAGNOSIS — Z12.31 ENCOUNTER FOR SCREENING MAMMOGRAM FOR BREAST CANCER: ICD-10-CM

## 2022-11-03 DIAGNOSIS — M79.89 SWELLING OF LEFT HAND: ICD-10-CM

## 2022-11-03 DIAGNOSIS — M79.7 FIBROMYALGIA: ICD-10-CM

## 2022-11-03 NOTE — H&P (VIEW-ONLY)
Assessment/Plan:  Cleared for proposed surgery    Xray the hand   Wrap it and aice it still      1  Pre-op exam  -     Comprehensive metabolic panel; Future  -     CBC and differential; Future  -     HEMOGLOBIN A1C W/ EAG ESTIMATION; Future    2  Frandy's deformity, right    3  Encounter for screening mammogram for breast cancer  -     Ambulatory Referral to Gynecology; Future  -     Mammo screening bilateral w 3d & cad; Future; Expected date: 11/03/2022    4  KASANDRA (obstructive sleep apnea)  -     Ambulatory Referral to Pulmonology; Future    5  Need for vaccination  -     influenza vaccine, quadrivalent, recombinant, PF, 0 5 mL, for patients 18 yr+ (FLUBLOK)    6  Other long term (current) drug therapy  -     HEMOGLOBIN A1C W/ EAG ESTIMATION; Future    7  Swelling of left hand  -     XR hand 3+ vw left; Future; Expected date: 11/03/2022    8  Primary hypertension    9  Fibromyalgia       Subjective:      Patient ID: Kranthi Thompson is a 62 y o  female  HPI  Here for preop   Right heel pain and  Deformity   Also left hand pain x 3 weeks after trauma xray nl then at   [facility] would like update on prolia  And never saw sleep med for kasandra   reallly old study ? The following portions of the patient's history were reviewed and updated as appropriate: allergies, current medications, past family history, past medical history, past social history, past surgical history and problem list     Review of Systems   Constitutional: Negative for fever and unexpected weight change  HENT: Negative for nosebleeds and trouble swallowing  Eyes: Negative for visual disturbance  Respiratory: Negative for chest tightness and shortness of breath  Cardiovascular: Negative for chest pain, palpitations and leg swelling  Gastrointestinal: Negative for abdominal pain, constipation, diarrhea and nausea  Endocrine: Negative for cold intolerance  Genitourinary: Negative for dysuria and urgency     Musculoskeletal: Positive for arthralgias, back pain and gait problem  Negative for joint swelling and myalgias  Skin: Negative for rash  Neurological: Negative for tremors, seizures and syncope  Hematological: Does not bruise/bleed easily  Psychiatric/Behavioral: Positive for dysphoric mood  Negative for hallucinations and suicidal ideas  Objective:      /82 (BP Location: Left arm, Patient Position: Sitting, Cuff Size: Large)   Pulse 96   Resp 16   Ht 5' 1" (1 549 m)   Wt 87 5 kg (193 lb)   SpO2 97%   BMI 36 47 kg/m²     No visits with results within 2 Week(s) from this visit     Latest known visit with results is:   Appointment on 08/08/2022   Component Date Value   • Sodium 08/08/2022 139    • Potassium 08/08/2022 3 6    • Chloride 08/08/2022 104    • CO2 08/08/2022 26    • ANION GAP 08/08/2022 9    • BUN 08/08/2022 13    • Creatinine 08/08/2022 0 67    • Glucose, Fasting 08/08/2022 93    • Calcium 08/08/2022 8 7    • AST 08/08/2022 18    • ALT 08/08/2022 18    • Alkaline Phosphatase 08/08/2022 61    • Total Protein 08/08/2022 6 9    • Albumin 08/08/2022 3 9    • Total Bilirubin 08/08/2022 0 55    • eGFR 08/08/2022 97    • WBC 08/08/2022 5 86    • RBC 08/08/2022 4 21    • Hemoglobin 08/08/2022 13 4    • Hematocrit 08/08/2022 39 0    • MCV 08/08/2022 93    • MCH 08/08/2022 31 8    • MCHC 08/08/2022 34 4    • RDW 08/08/2022 10 9 (A)   • MPV 08/08/2022 9 9    • Platelets 19/94/1819 214    • nRBC 08/08/2022 0    • Neutrophils Relative 08/08/2022 58    • Immat GRANS % 08/08/2022 0    • Lymphocytes Relative 08/08/2022 32    • Monocytes Relative 08/08/2022 7    • Eosinophils Relative 08/08/2022 2    • Basophils Relative 08/08/2022 1    • Neutrophils Absolute 08/08/2022 3 42    • Immature Grans Absolute 08/08/2022 0 01    • Lymphocytes Absolute 08/08/2022 1 86    • Monocytes Absolute 08/08/2022 0 43    • Eosinophils Absolute 08/08/2022 0 11    • Basophils Absolute 08/08/2022 0 03    • PTT 08/08/2022 29    • Protime 08/08/2022 13 7    • INR 08/08/2022 1 02    • Hemoglobin A1C 08/08/2022 5 7 (A)   • EAG 08/08/2022 117           Physical Exam  Vitals and nursing note reviewed  Constitutional:       Appearance: She is well-developed  She is obese  HENT:      Head: Normocephalic and atraumatic  Cardiovascular:      Rate and Rhythm: Normal rate and regular rhythm  Heart sounds: Normal heart sounds  No murmur heard  Pulmonary:      Effort: Pulmonary effort is normal       Breath sounds: Normal breath sounds  No wheezing or rales  Abdominal:      General: Bowel sounds are normal  There is no distension  Palpations: Abdomen is soft  Tenderness: There is no abdominal tenderness  Musculoskeletal:         General: Tenderness present  Normal range of motion  Cervical back: Normal range of motion and neck supple  Comments: Swelling of left hand no echy tenderness but no point tenderness  And right walking shoe with heel deformity    Lymphadenopathy:      Cervical: No cervical adenopathy  Skin:     General: Skin is warm and dry  Capillary Refill: Capillary refill takes less than 2 seconds  Findings: No rash  Neurological:      Mental Status: She is alert and oriented to person, place, and time  Cranial Nerves: No cranial nerve deficit  Sensory: No sensory deficit  Motor: No abnormal muscle tone  Gait: Gait abnormal    Psychiatric:         Behavior: Behavior normal          Thought Content:  Thought content normal          Judgment: Judgment normal               [facility] [place]

## 2022-11-03 NOTE — PROGRESS NOTES
Assessment/Plan:  Cleared for proposed surgery    Xray the hand   Wrap it and aice it still      1  Pre-op exam  -     Comprehensive metabolic panel; Future  -     CBC and differential; Future  -     HEMOGLOBIN A1C W/ EAG ESTIMATION; Future    2  Frandy's deformity, right    3  Encounter for screening mammogram for breast cancer  -     Ambulatory Referral to Gynecology; Future  -     Mammo screening bilateral w 3d & cad; Future; Expected date: 11/03/2022    4  KASANDRA (obstructive sleep apnea)  -     Ambulatory Referral to Pulmonology; Future    5  Need for vaccination  -     influenza vaccine, quadrivalent, recombinant, PF, 0 5 mL, for patients 18 yr+ (FLUBLOK)    6  Other long term (current) drug therapy  -     HEMOGLOBIN A1C W/ EAG ESTIMATION; Future    7  Swelling of left hand  -     XR hand 3+ vw left; Future; Expected date: 11/03/2022    8  Primary hypertension    9  Fibromyalgia       Subjective:      Patient ID: Damien Wood is a 62 y o  female  HPI  Here for preop   Right heel pain and  Deformity   Also left hand pain x 3 weeks after trauma xray nl then at   [facility] would like update on prolia  And never saw sleep med for kasandra   reallly old study ? The following portions of the patient's history were reviewed and updated as appropriate: allergies, current medications, past family history, past medical history, past social history, past surgical history and problem list     Review of Systems   Constitutional: Negative for fever and unexpected weight change  HENT: Negative for nosebleeds and trouble swallowing  Eyes: Negative for visual disturbance  Respiratory: Negative for chest tightness and shortness of breath  Cardiovascular: Negative for chest pain, palpitations and leg swelling  Gastrointestinal: Negative for abdominal pain, constipation, diarrhea and nausea  Endocrine: Negative for cold intolerance  Genitourinary: Negative for dysuria and urgency     Musculoskeletal: Positive for arthralgias, back pain and gait problem  Negative for joint swelling and myalgias  Skin: Negative for rash  Neurological: Negative for tremors, seizures and syncope  Hematological: Does not bruise/bleed easily  Psychiatric/Behavioral: Positive for dysphoric mood  Negative for hallucinations and suicidal ideas  Objective:      /82 (BP Location: Left arm, Patient Position: Sitting, Cuff Size: Large)   Pulse 96   Resp 16   Ht 5' 1" (1 549 m)   Wt 87 5 kg (193 lb)   SpO2 97%   BMI 36 47 kg/m²     No visits with results within 2 Week(s) from this visit     Latest known visit with results is:   Appointment on 08/08/2022   Component Date Value   • Sodium 08/08/2022 139    • Potassium 08/08/2022 3 6    • Chloride 08/08/2022 104    • CO2 08/08/2022 26    • ANION GAP 08/08/2022 9    • BUN 08/08/2022 13    • Creatinine 08/08/2022 0 67    • Glucose, Fasting 08/08/2022 93    • Calcium 08/08/2022 8 7    • AST 08/08/2022 18    • ALT 08/08/2022 18    • Alkaline Phosphatase 08/08/2022 61    • Total Protein 08/08/2022 6 9    • Albumin 08/08/2022 3 9    • Total Bilirubin 08/08/2022 0 55    • eGFR 08/08/2022 97    • WBC 08/08/2022 5 86    • RBC 08/08/2022 4 21    • Hemoglobin 08/08/2022 13 4    • Hematocrit 08/08/2022 39 0    • MCV 08/08/2022 93    • MCH 08/08/2022 31 8    • MCHC 08/08/2022 34 4    • RDW 08/08/2022 10 9 (A)   • MPV 08/08/2022 9 9    • Platelets 28/97/4133 214    • nRBC 08/08/2022 0    • Neutrophils Relative 08/08/2022 58    • Immat GRANS % 08/08/2022 0    • Lymphocytes Relative 08/08/2022 32    • Monocytes Relative 08/08/2022 7    • Eosinophils Relative 08/08/2022 2    • Basophils Relative 08/08/2022 1    • Neutrophils Absolute 08/08/2022 3 42    • Immature Grans Absolute 08/08/2022 0 01    • Lymphocytes Absolute 08/08/2022 1 86    • Monocytes Absolute 08/08/2022 0 43    • Eosinophils Absolute 08/08/2022 0 11    • Basophils Absolute 08/08/2022 0 03    • PTT 08/08/2022 29    • Protime 08/08/2022 13 7    • INR 08/08/2022 1 02    • Hemoglobin A1C 08/08/2022 5 7 (A)   • EAG 08/08/2022 117           Physical Exam  Vitals and nursing note reviewed  Constitutional:       Appearance: She is well-developed  She is obese  HENT:      Head: Normocephalic and atraumatic  Cardiovascular:      Rate and Rhythm: Normal rate and regular rhythm  Heart sounds: Normal heart sounds  No murmur heard  Pulmonary:      Effort: Pulmonary effort is normal       Breath sounds: Normal breath sounds  No wheezing or rales  Abdominal:      General: Bowel sounds are normal  There is no distension  Palpations: Abdomen is soft  Tenderness: There is no abdominal tenderness  Musculoskeletal:         General: Tenderness present  Normal range of motion  Cervical back: Normal range of motion and neck supple  Comments: Swelling of left hand no echy tenderness but no point tenderness  And right walking shoe with heel deformity    Lymphadenopathy:      Cervical: No cervical adenopathy  Skin:     General: Skin is warm and dry  Capillary Refill: Capillary refill takes less than 2 seconds  Findings: No rash  Neurological:      Mental Status: She is alert and oriented to person, place, and time  Cranial Nerves: No cranial nerve deficit  Sensory: No sensory deficit  Motor: No abnormal muscle tone  Gait: Gait abnormal    Psychiatric:         Behavior: Behavior normal          Thought Content:  Thought content normal          Judgment: Judgment normal               [facility]

## 2022-11-08 NOTE — PRE-PROCEDURE INSTRUCTIONS
Pre-Surgery Instructions:   Medication Instructions   • baclofen 20 mg tablet Take day of surgery  • [facility] Vitamin C 500 MG tablet Stop taking 7 days prior to surgery  • doxepin (SINEquan) 150 MG capsule Take night before surgery   • ergocalciferol (VITAMIN D2) 50,000 units Stop taking 7 days prior to surgery  • gabapentin (NEURONTIN) 300 mg capsule Take day of surgery  • gabapentin (Neurontin) 600 MG tablet Take day of surgery  • valsartan (DIOVAN) 160 mg tablet Hold day of surgery  • vitamin B-12 (VITAMIN B-12) 500 mcg tablet Stop taking 7 days prior to surgery  Med list reviewed as above  Also instructed pt not to start any new vitamins/supplements preoperatively and to avoid NSAID's  7 days prior to surgery  Tylenol is acceptable if needed  Pt has and/or is getting CHG surgical soap and verbalizes understanding of preoperative showering protocol  All information within "My Surgical Experience" pamphlet reviewed and patient verbalizes understanding and compliance  All questions answered

## 2022-11-09 ENCOUNTER — APPOINTMENT (OUTPATIENT)
Dept: LAB | Facility: HOSPITAL | Age: 57
End: 2022-11-09

## 2022-11-09 DIAGNOSIS — Z79.899 OTHER LONG TERM (CURRENT) DRUG THERAPY: ICD-10-CM

## 2022-11-09 DIAGNOSIS — Z01.818 PRE-OP EXAM: ICD-10-CM

## 2022-11-09 LAB
ALBUMIN SERPL BCP-MCNC: 3.5 G/DL (ref 3.5–5)
ALP SERPL-CCNC: 64 U/L (ref 34–104)
ALT SERPL W P-5'-P-CCNC: 10 U/L (ref 7–52)
ANION GAP SERPL CALCULATED.3IONS-SCNC: 8 MMOL/L (ref 4–13)
AST SERPL W P-5'-P-CCNC: 11 U/L (ref 13–39)
BASOPHILS # BLD AUTO: 0.02 THOUSANDS/ÂΜL (ref 0–0.1)
BASOPHILS NFR BLD AUTO: 0 % (ref 0–1)
BILIRUB SERPL-MCNC: 0.4 MG/DL (ref 0.2–1)
BUN SERPL-MCNC: 12 MG/DL (ref 5–25)
CALCIUM SERPL-MCNC: 8.6 MG/DL (ref 8.4–10.2)
CHLORIDE SERPL-SCNC: 103 MMOL/L (ref 96–108)
CO2 SERPL-SCNC: 26 MMOL/L (ref 21–32)
CREAT SERPL-MCNC: 0.52 MG/DL (ref 0.6–1.3)
EOSINOPHIL # BLD AUTO: 0.16 THOUSAND/ÂΜL (ref 0–0.61)
EOSINOPHIL NFR BLD AUTO: 2 % (ref 0–6)
ERYTHROCYTE [DISTWIDTH] IN BLOOD BY AUTOMATED COUNT: 10.9 % (ref 11.6–15.1)
EST. AVERAGE GLUCOSE BLD GHB EST-MCNC: 117 MG/DL
GFR SERPL CREATININE-BSD FRML MDRD: 106 ML/MIN/1.73SQ M
GLUCOSE P FAST SERPL-MCNC: 88 MG/DL (ref 65–99)
HBA1C MFR BLD: 5.7 %
HCT VFR BLD AUTO: 35.1 % (ref 34.8–46.1)
HGB BLD-MCNC: 11.9 G/DL (ref 11.5–15.4)
IMM GRANULOCYTES # BLD AUTO: 0.02 THOUSAND/UL (ref 0–0.2)
IMM GRANULOCYTES NFR BLD AUTO: 0 % (ref 0–2)
LYMPHOCYTES # BLD AUTO: 1.51 THOUSANDS/ÂΜL (ref 0.6–4.47)
LYMPHOCYTES NFR BLD AUTO: 20 % (ref 14–44)
MCH RBC QN AUTO: 31.4 PG (ref 26.8–34.3)
MCHC RBC AUTO-ENTMCNC: 33.9 G/DL (ref 31.4–37.4)
MCV RBC AUTO: 93 FL (ref 82–98)
MONOCYTES # BLD AUTO: 0.54 THOUSAND/ÂΜL (ref 0.17–1.22)
MONOCYTES NFR BLD AUTO: 7 % (ref 4–12)
NEUTROPHILS # BLD AUTO: 5.24 THOUSANDS/ÂΜL (ref 1.85–7.62)
NEUTS SEG NFR BLD AUTO: 71 % (ref 43–75)
NRBC BLD AUTO-RTO: 0 /100 WBCS
PLATELET # BLD AUTO: 281 THOUSANDS/UL (ref 149–390)
PMV BLD AUTO: 9.2 FL (ref 8.9–12.7)
POTASSIUM SERPL-SCNC: 3.9 MMOL/L (ref 3.5–5.3)
PROT SERPL-MCNC: 6.7 G/DL (ref 6.4–8.4)
RBC # BLD AUTO: 3.79 MILLION/UL (ref 3.81–5.12)
SODIUM SERPL-SCNC: 137 MMOL/L (ref 135–147)
WBC # BLD AUTO: 7.49 THOUSAND/UL (ref 4.31–10.16)

## 2022-11-13 ENCOUNTER — ANESTHESIA EVENT (OUTPATIENT)
Dept: PERIOP | Facility: HOSPITAL | Age: 57
End: 2022-11-13

## 2022-11-14 ENCOUNTER — APPOINTMENT (OUTPATIENT)
Dept: RADIOLOGY | Facility: HOSPITAL | Age: 57
End: 2022-11-14

## 2022-11-14 ENCOUNTER — ANESTHESIA (OUTPATIENT)
Dept: PERIOP | Facility: HOSPITAL | Age: 57
End: 2022-11-14

## 2022-11-14 ENCOUNTER — HOSPITAL ENCOUNTER (OUTPATIENT)
Facility: HOSPITAL | Age: 57
Setting detail: OUTPATIENT SURGERY
Discharge: HOME | End: 2022-11-14
Attending: PODIATRIST | Admitting: PODIATRIST

## 2022-11-14 VITALS
DIASTOLIC BLOOD PRESSURE: 77 MMHG | HEART RATE: 63 BPM | WEIGHT: 193 LBS | RESPIRATION RATE: 18 BRPM | BODY MASS INDEX: 36.44 KG/M2 | SYSTOLIC BLOOD PRESSURE: 116 MMHG | OXYGEN SATURATION: 100 % | HEIGHT: 61 IN | TEMPERATURE: 97.3 F

## 2022-11-14 DIAGNOSIS — Z98.890 POST-OPERATIVE STATE: Primary | ICD-10-CM

## 2022-11-14 DIAGNOSIS — M76.61 ACHILLES TENDINITIS, RIGHT LEG: ICD-10-CM

## 2022-11-14 DIAGNOSIS — M24.571 CONTRACTURE, RIGHT ANKLE: ICD-10-CM

## 2022-11-14 DEVICE — 5.5MM STRYKER REELX STT ANCHOR
Type: IMPLANTABLE DEVICE | Site: ACHILLES TENDON | Status: FUNCTIONAL
Brand: REELX

## 2022-11-14 DEVICE — SONICANCHOR KIT
Type: IMPLANTABLE DEVICE | Site: ACHILLES TENDON | Status: FUNCTIONAL
Brand: SONICANCHOR

## 2022-11-14 RX ORDER — CEFAZOLIN SODIUM 2 G/50ML
SOLUTION INTRAVENOUS AS NEEDED
Status: DISCONTINUED | OUTPATIENT
Start: 2022-11-14 | End: 2022-11-14

## 2022-11-14 RX ORDER — BUPIVACAINE HYDROCHLORIDE 5 MG/ML
INJECTION, SOLUTION EPIDURAL; INTRACAUDAL AS NEEDED
Status: DISCONTINUED | OUTPATIENT
Start: 2022-11-14 | End: 2022-11-14

## 2022-11-14 RX ORDER — OXYCODONE HYDROCHLORIDE AND ACETAMINOPHEN 5; 325 MG/1; MG/1
1 TABLET ORAL EVERY 4 HOURS PRN
Status: DISCONTINUED | OUTPATIENT
Start: 2022-11-14 | End: 2022-11-14 | Stop reason: HOSPADM

## 2022-11-14 RX ORDER — MIDAZOLAM HYDROCHLORIDE 2 MG/2ML
INJECTION, SOLUTION INTRAMUSCULAR; INTRAVENOUS AS NEEDED
Status: DISCONTINUED | OUTPATIENT
Start: 2022-11-14 | End: 2022-11-14

## 2022-11-14 RX ORDER — METOCLOPRAMIDE HYDROCHLORIDE 5 MG/ML
10 INJECTION INTRAMUSCULAR; INTRAVENOUS ONCE AS NEEDED
Status: DISCONTINUED | OUTPATIENT
Start: 2022-11-14 | End: 2022-11-14 | Stop reason: HOSPADM

## 2022-11-14 RX ORDER — LIDOCAINE HYDROCHLORIDE 10 MG/ML
INJECTION, SOLUTION EPIDURAL; INFILTRATION; INTRACAUDAL; PERINEURAL AS NEEDED
Status: DISCONTINUED | OUTPATIENT
Start: 2022-11-14 | End: 2022-11-14

## 2022-11-14 RX ORDER — OXYCODONE HYDROCHLORIDE AND ACETAMINOPHEN 5; 325 MG/1; MG/1
1 TABLET ORAL EVERY 6 HOURS PRN
Qty: 20 TABLET | Refills: 0 | Status: SHIPPED | OUTPATIENT
Start: 2022-11-14 | End: 2022-11-24

## 2022-11-14 RX ORDER — DEXAMETHASONE SODIUM PHOSPHATE 10 MG/ML
INJECTION, SOLUTION INTRAMUSCULAR; INTRAVENOUS AS NEEDED
Status: DISCONTINUED | OUTPATIENT
Start: 2022-11-14 | End: 2022-11-14

## 2022-11-14 RX ORDER — CHLORHEXIDINE GLUCONATE 0.12 MG/ML
15 RINSE ORAL ONCE
Status: COMPLETED | OUTPATIENT
Start: 2022-11-14 | End: 2022-11-14

## 2022-11-14 RX ORDER — SODIUM CHLORIDE, SODIUM LACTATE, POTASSIUM CHLORIDE, CALCIUM CHLORIDE 600; 310; 30; 20 MG/100ML; MG/100ML; MG/100ML; MG/100ML
INJECTION, SOLUTION INTRAVENOUS CONTINUOUS PRN
Status: DISCONTINUED | OUTPATIENT
Start: 2022-11-14 | End: 2022-11-14

## 2022-11-14 RX ORDER — PROPOFOL 10 MG/ML
INJECTION, EMULSION INTRAVENOUS CONTINUOUS PRN
Status: DISCONTINUED | OUTPATIENT
Start: 2022-11-14 | End: 2022-11-14

## 2022-11-14 RX ORDER — ONDANSETRON 2 MG/ML
INJECTION INTRAMUSCULAR; INTRAVENOUS AS NEEDED
Status: DISCONTINUED | OUTPATIENT
Start: 2022-11-14 | End: 2022-11-14

## 2022-11-14 RX ORDER — ONDANSETRON 2 MG/ML
4 INJECTION INTRAMUSCULAR; INTRAVENOUS ONCE AS NEEDED
Status: DISCONTINUED | OUTPATIENT
Start: 2022-11-14 | End: 2022-11-14 | Stop reason: HOSPADM

## 2022-11-14 RX ORDER — FENTANYL CITRATE/PF 50 MCG/ML
50 SYRINGE (ML) INJECTION
Status: DISCONTINUED | OUTPATIENT
Start: 2022-11-14 | End: 2022-11-14 | Stop reason: HOSPADM

## 2022-11-14 RX ORDER — ROCURONIUM BROMIDE 10 MG/ML
INJECTION, SOLUTION INTRAVENOUS AS NEEDED
Status: DISCONTINUED | OUTPATIENT
Start: 2022-11-14 | End: 2022-11-14

## 2022-11-14 RX ORDER — HYDROMORPHONE HCL/PF 1 MG/ML
0.4 SYRINGE (ML) INJECTION
Status: DISCONTINUED | OUTPATIENT
Start: 2022-11-14 | End: 2022-11-14 | Stop reason: HOSPADM

## 2022-11-14 RX ORDER — MAGNESIUM HYDROXIDE 1200 MG/15ML
LIQUID ORAL AS NEEDED
Status: DISCONTINUED | OUTPATIENT
Start: 2022-11-14 | End: 2022-11-14 | Stop reason: HOSPADM

## 2022-11-14 RX ORDER — ASPIRIN 325 MG
325 TABLET ORAL DAILY
Qty: 30 TABLET | Refills: 0 | Status: SHIPPED | OUTPATIENT
Start: 2022-11-14

## 2022-11-14 RX ORDER — SODIUM CHLORIDE, SODIUM LACTATE, POTASSIUM CHLORIDE, CALCIUM CHLORIDE 600; 310; 30; 20 MG/100ML; MG/100ML; MG/100ML; MG/100ML
125 INJECTION, SOLUTION INTRAVENOUS CONTINUOUS
Status: DISCONTINUED | OUTPATIENT
Start: 2022-11-14 | End: 2022-11-14 | Stop reason: HOSPADM

## 2022-11-14 RX ORDER — FENTANYL CITRATE 50 UG/ML
INJECTION, SOLUTION INTRAMUSCULAR; INTRAVENOUS AS NEEDED
Status: DISCONTINUED | OUTPATIENT
Start: 2022-11-14 | End: 2022-11-14

## 2022-11-14 RX ORDER — NEOSTIGMINE METHYLSULFATE 1 MG/ML
INJECTION INTRAVENOUS AS NEEDED
Status: DISCONTINUED | OUTPATIENT
Start: 2022-11-14 | End: 2022-11-14

## 2022-11-14 RX ORDER — GLYCOPYRROLATE 0.2 MG/ML
INJECTION INTRAMUSCULAR; INTRAVENOUS AS NEEDED
Status: DISCONTINUED | OUTPATIENT
Start: 2022-11-14 | End: 2022-11-14

## 2022-11-14 RX ORDER — CEFAZOLIN SODIUM 2 G/50ML
2000 SOLUTION INTRAVENOUS ONCE
Status: DISCONTINUED | OUTPATIENT
Start: 2022-11-14 | End: 2022-11-14 | Stop reason: HOSPADM

## 2022-11-14 RX ORDER — BUPIVACAINE HYDROCHLORIDE 5 MG/ML
INJECTION, SOLUTION PERINEURAL AS NEEDED
Status: DISCONTINUED | OUTPATIENT
Start: 2022-11-14 | End: 2022-11-14 | Stop reason: HOSPADM

## 2022-11-14 RX ORDER — PROPOFOL 10 MG/ML
INJECTION, EMULSION INTRAVENOUS AS NEEDED
Status: DISCONTINUED | OUTPATIENT
Start: 2022-11-14 | End: 2022-11-14

## 2022-11-14 RX ORDER — SCOLOPAMINE TRANSDERMAL SYSTEM 1 MG/1
1 PATCH, EXTENDED RELEASE TRANSDERMAL
Status: DISCONTINUED | OUTPATIENT
Start: 2022-11-14 | End: 2022-11-14 | Stop reason: HOSPADM

## 2022-11-14 RX ADMIN — FENTANYL CITRATE 50 MCG: 50 INJECTION, SOLUTION INTRAMUSCULAR; INTRAVENOUS at 13:58

## 2022-11-14 RX ADMIN — PROPOFOL 200 MG: 10 INJECTION, EMULSION INTRAVENOUS at 15:01

## 2022-11-14 RX ADMIN — ONDANSETRON 4 MG: 2 INJECTION INTRAMUSCULAR; INTRAVENOUS at 16:37

## 2022-11-14 RX ADMIN — DEXAMETHASONE SODIUM PHOSPHATE 10 MG: 10 INJECTION, SOLUTION INTRAMUSCULAR; INTRAVENOUS at 15:21

## 2022-11-14 RX ADMIN — CEFAZOLIN SODIUM 2000 MG: 2 SOLUTION INTRAVENOUS at 14:55

## 2022-11-14 RX ADMIN — PROPOFOL 100 MCG/KG/MIN: 10 INJECTION, EMULSION INTRAVENOUS at 15:15

## 2022-11-14 RX ADMIN — ROCURONIUM BROMIDE 50 MG: 10 INJECTION, SOLUTION INTRAVENOUS at 15:02

## 2022-11-14 RX ADMIN — SODIUM CHLORIDE, SODIUM LACTATE, POTASSIUM CHLORIDE, AND CALCIUM CHLORIDE: .6; .31; .03; .02 INJECTION, SOLUTION INTRAVENOUS at 15:27

## 2022-11-14 RX ADMIN — CHLORHEXIDINE GLUCONATE 0.12% ORAL RINSE 15 ML: 1.2 LIQUID ORAL at 12:48

## 2022-11-14 RX ADMIN — NEOSTIGMINE 4 MG: 1 INJECTION INTRAVENOUS at 16:47

## 2022-11-14 RX ADMIN — FENTANYL CITRATE 50 MCG: 50 INJECTION, SOLUTION INTRAMUSCULAR; INTRAVENOUS at 15:15

## 2022-11-14 RX ADMIN — LIDOCAINE HYDROCHLORIDE 50 MG: 10 INJECTION, SOLUTION EPIDURAL; INFILTRATION; INTRACAUDAL; PERINEURAL at 15:01

## 2022-11-14 RX ADMIN — MIDAZOLAM 2 MG: 1 INJECTION INTRAMUSCULAR; INTRAVENOUS at 13:58

## 2022-11-14 RX ADMIN — GLYCOPYRROLATE 0.4 MG: 0.4 INJECTION INTRAMUSCULAR; INTRAVENOUS at 16:47

## 2022-11-14 RX ADMIN — BUPIVACAINE HYDROCHLORIDE 25 ML: 5 INJECTION, SOLUTION EPIDURAL; INTRACAUDAL at 14:06

## 2022-11-14 RX ADMIN — SODIUM CHLORIDE, SODIUM LACTATE, POTASSIUM CHLORIDE, AND CALCIUM CHLORIDE: .6; .31; .03; .02 INJECTION, SOLUTION INTRAVENOUS at 13:54

## 2022-11-14 RX ADMIN — SCOPALAMINE 1 PATCH: 1 PATCH, EXTENDED RELEASE TRANSDERMAL at 13:18

## 2022-11-14 RX ADMIN — SUGAMMADEX 200 MG: 100 INJECTION, SOLUTION INTRAVENOUS at 17:03

## 2022-11-14 RX ADMIN — OXYCODONE HYDROCHLORIDE AND ACETAMINOPHEN 1 TABLET: 5; 325 TABLET ORAL at 17:56

## 2022-11-14 NOTE — DISCHARGE SUMMARY
Discharge Summary Outpatient Procedure Podiatry -   Helena Stark 62 y o  female MRN: [id number]  Unit/Bed#: OR POOL Encounter: [id number]    Admission Date: 11/14/2022     Admitting Diagnosis: Contracture, right ankle [M24 571]  Achilles tendinitis, right leg [M76 61]    Discharge Diagnosis: same    Procedures Performed: HAGLUNDS REPAIR:   ACHILLES TENDON PARITAL TEAR REPAIR:     Complications: none    Condition at Discharge: stable    Discharge instructions/Information to patient and family:   See after visit summary for information provided to patient and family  Provisions for Follow-Up Care/Important appointments:  See after visit summary for information related to follow-up care and any pertinent home health orders  Discharge Medications:  See after visit summary for reconciled discharge medications provided to patient and family

## 2022-11-14 NOTE — ANESTHESIA PREPROCEDURE EVALUATION
Procedure:  HAGLUNDS REPAIR (Right Foot)  ACHILLES TENDON PARITAL TEAR REPAIR (Right Ankle)    Relevant Problems   CARDIO   (+) Primary hypertension      GI/HEPATIC   (+) Bariatric surgery status      MUSCULOSKELETAL   (+) Chronic low back pain with left-sided sciatica   (+) Fibromyalgia   (+) Piriformis syndrome of left side   (+) Piriformis syndrome of right side   (+) Sacroiliitis (HCC)      NEURO/PSYCH   (+) Anxiety   (+) Chronic low back pain with left-sided sciatica   (+) Chronic pain syndrome   (+) Fibromyalgia   (+) Moderate episode of recurrent major depressive disorder (HCC)      PULMONARY   (+) Sleep apnea      Digestive   (+) Postsurgical malabsorption      Musculoskeletal and Integument   (+) Lumbar facet arthropathy   (+) Lumbar foraminal stenosis   (+) Trochanteric bursitis of both hips      Other   (+) CPAP (continuous positive airway pressure) dependence   (+) Constipation   (+) H/O gastric bypass   (+) Obesity, Class II, BMI 70-39 7   (+) On tricyclic antidepressant therapy   (+) Spinal stenosis of lumbar region with neurogenic claudication      Neurostimulator turned off by patient  Physical Exam    Airway    Mallampati score: II  TM Distance: >3 FB  Neck ROM: full     Dental       Cardiovascular      Pulmonary      Other Findings        Anesthesia Plan  ASA Score- 3     Anesthesia Type- general with ASA Monitors  Additional Monitors:   Airway Plan: ETT  Plan Factors-Exercise tolerance (METS): <4 METS  Chart reviewed  Patient summary reviewed  Patient is not a current smoker  Patient did not smoke on day of surgery  Induction- intravenous  Postoperative Plan- Plan for postoperative opioid use  Planned trial extubation    Informed Consent- Anesthetic plan and risks discussed with patient  I personally reviewed this patient with the CRNA  Discussed and agreed on the Anesthesia Plan with the CRNA  Radha Weber

## 2022-11-14 NOTE — OP NOTE
OPERATIVE REPORT - Podiatry  PATIENT NAME: Jada Lindo    :  1965  MRN: [id number]  Pt Location:  OR ROOM 11    SURGERY DATE: 2022    Surgeon(s) and Role:     * Alyssa Cruz DPM - Primary     * Jeremy Santacruz DPM - Assisting    Pre-op Diagnosis:  Contracture, right ankle [M24 571]  Achilles tendinitis, right leg [M76 61]    Post-Op Diagnosis Codes:     * Contracture, right ankle [M24 571]     * Achilles tendinitis, right leg [M76 61]    Procedure(s) (LRB):  HAGLUNDS REPAIR (Right)  ACHILLES TENDON PARITAL TEAR REPAIR (Right)    Specimen(s):  ID Type Source Tests Collected by Time Destination   1 : bursa sac right achilles Tissue Bursa/Synovial Cyst TISSUE EXAM Alyssa Cruz DPM 2022 1527        Estimated Blood Loss:   Minimal    Drains:  * No LDAs found *    Anesthesia Type:   General w/ Popliteal Block     Hemostasis:  Pneumatic thigh tourniquet, anatomic dissection, electrocautery    Materials:  Implant Name Type Inv  Item Serial No   Lot No  LRB No  Used Action   ANCHOR SONIC KIT 2 5 X 10MM FORCE FIBER 2 C-7 - [id number]  ANCHOR SONIC KIT 2 5 X 10MM FORCE FIBER 2 C-7  SUMEET [employer] [id number] Right 1 Implanted   ANCHOR SONIC KIT 2 5 X 10MM FORCE FIBER 2 C-7 - [id number]  ANCHOR SONIC KIT 2 5 X 10MM FORCE FIBER 2 C-7  SUMEET ORTHO [id number] Right 1 Implanted   SUT ANCHOR REELX 5 5MM - [id number]  SUT ANCHOR REELX 5 5MM  SUMEET ORTHO [id number] Right 1 Implanted   SUT ANCHOR REELX 5 5MM - [id number]  SUT ANCHOR REELX 5 5MM  SUMEET ORTHO [id number] Right 1 Implanted     Vicryl  Prolene    Operative Findings:  Consistent with diagnosis:  Large posterior-superior calcaneal protuberance and insertional enthesophytes  Complications:   None    Procedure and Technique:   Under mild sedation, the patient was brought into the operating room, patient underwent general anesthesia    A pneumatic thigh tourniquet was placed over the patient's right thigh and the patient placed on the operating room table in the prone position  The foot was then prepped and draped in the usual aseptic manner  A universal timeout was performed where all parties are in agreement of correct patient, correct procedure and correct site  An esmarch bandage was used to exsangunate the foot and the pneumatic tourniquet was then inflated to 325 mmHg  Attention was directed to the right posterior heel where a linear incision was made through skin and subcutaneous tissue with a 15 blade central on the from the Achilles tendon insertion on the calcaneus approximately 3 cm proximally  Incision was deepened along the central aspect of the Achilles tendon until the depth of the peritenon was reached  Using a 15 blade, the Achilles tendon was incised centrally from insertion down to calcaneus and approximately 3 cm proximally; the Achilles tendon was then reflected off the calcaneus medially and laterally exposing the operative site  Underlying bone deformity was appreciated; bony enlargement of the posterior superior calcaneal process was noted with enthesophytes at the insertion of the Achilles tendon  Utilizing sagittal saw appropriate wedge of calcaneus and the enthesophytes was removed  Any additional osteophytic lipping was smoothed with a rongeur and rasp  Attention was then redirected back to the posterior heel  The Achilles tendon was then reapproximated repaired utilizing Vicryl in a running locking fashion  The Achilles tendon was then reattached to the calcaneal bone utilizing 2  REELX STT Anchors & 2 Sonic Anchors per [facility] guidelines  The surgical incision was irrigated with copious amounts of normal sterile saline  Subcutaneous closure was obtained utilizing Vicryl  Skin edges were reapproximated and closure was obtained utilizing prolene  The foot was then cleansed and dried  A postoperative injection consisting of 10 ml of 0 5% Bupivacaine was performed   The incision site was dressed with Betadine soaked adaptic and a dry sterile dressing with posterior splint  The tourniquet was deflated and normal hyperemic flush was noted to all digits  The patient tolerated the procedure and anesthesia well and was transported to the PACU with vital signs stable  Dr Ximena Roberts was present during the entire procedure and participated in all key aspects  SIGNATURE: Erasmo Hernandez DPM  DATE: November 14, 2022  TIME: 4:56 PM      Portions of the record may have been created with voice recognition software  Occasional wrong word or "sound a like" substitutions may have occurred due to the inherent limitations of voice recognition software  Read the chart carefully and recognize, using context, where substitutions have occurred

## 2022-11-14 NOTE — INTERVAL H&P NOTE
H&P reviewed  After examining the patient I find no changes in the patients condition since the H&P had been written      Vitals:    11/14/22 1216   BP: 146/88   Pulse: 86   Resp: 18   Temp: (!) 97 1 °F (36 2 °C)   SpO2: 98%

## 2022-11-14 NOTE — NURSING NOTE
Pt is awake,alert,tolerated diet  Written and verbal instructions given to pt, who verbalizes an understanding

## 2022-11-14 NOTE — DISCHARGE INSTRUCTIONS
Dr Mariusz Castillo  Post-Operative Instructions    Pain / Swelling  There is expected to be some discomfort, swelling and bruising of the foot  You might see some blood on the bandage  This is not a cause for alarm  However, if there is active or persistent bleeding (blood running out of the bandage while at rest) - call the office at once  Apply an ice bag behind right knee for 30 minutes for each waking hour, for the first 72 hours  This should be discontinued when sleeping  This will also work through your cast if you have one  Ice must not leak and wet the dressings  Also, using the ice inappropriately can cause permanent nerve damage and frostbite  Your foot should be elevated as much as possible for the first 72 hours  The foot should be above heart level  If your foot is below heart level, throbbing and pain will increase  When sleeping, elevation can be accomplished by putting a small hard suitcase between the box spring and mattress at the foot of the bed  Walking and standing will increase pain, throbbing and bleeding  Persistent pain despite elevation and your pain meds can many times be relieved by removing the tight brown compression layer (called the ACE wrap) that is over the white gauze dressing  If you are elevating and taking your pain meds and pain is still severe, remove this brown stretchy layer but leave the gauze intact  Wait 30 minutes  If the pain subsides, reapply the ACE so it’s not so tight  If pain doesn’t get better, call your doctor  Dressings / Casts  Do not remove your surgical dressings - they will be changed at your doctor appointment  Do not allow surgical dressings to get wet  Sponge baths should be used until the sutures are removed  Do not try to keep the foot dry using a garbage bag and tape - this rarely works  If you get your dressings or cast wet - call your doctor immediately  If your cast or dressings feel tight - elevate your foot for 30 minutes   If this doesn’t help and you feel tingling or see toe discoloration - call your doctor  Do not put things in your cast such as powder, coat hangers to scratch, etc  This can cause skin damage and infections  Infection  If you have a fever at or above 100 degrees, chills, sweats, or notice red streaks rising above the dressing or smell odor / see pus (creamy white drainage), call your doctor immediately  Constipation  If you have severe constipation after surgery, this can be due to the pain medication  Notify your doctor and special medication will be prescribed to deal with this  Blood Clots  If you had surgery and are in a cast, have an external fixation device, or are non-weightbearing using crutches, a knee scooter, a wheelchair, a walker, or an iWalk device - you need to be on a blood thinner  Your doctor will prescribe one of the regimens below  If you run out of the blood thinner checked off below before you are walking normally on your foot and out of your cast - notify your doctor immediately so you can get a refill  Not doing so can lead to blood clots and serious complications including death  Aspirin 325mg daily  Numbness  It is normal for your foot to be numb until about dinner time  If you’ve had a popliteal block procedure, you might be numb until the following day  When you start to feel pins and needles in the foot - this means the block is wearing off  That is the appropriate time to take your pain medication  Pain Medication  Take your post-operative pain medication as directed by your surgeon  Do not supplement your pain medication with over the counter drugs, old leftover pain medications, or extra Tylenol  You must discuss any additional medications with your doctor prior to taking them for pain  Driving  No driving is allowed without discussion with the doctor  Ambulation  Nonweightbearing to surgical foot  If given a flat, stiff shoe / darco wedge shoe, Do not walk at all without it    Use a device (cane, walker, crutches) to take some weight off of the foot when walking  If instructed not to put weight on the surgical foot, use the following:  Crutches     Putting weight on the foot will lead to complications

## 2022-11-14 NOTE — ANESTHESIA PROCEDURE NOTES
Peripheral Block    Patient location during procedure: holding area  Start time: 11/14/2022 2:06 PM  Reason for block: at surgeon's request and post-op pain management  Staffing  Performed: Anesthesiologist   Anesthesiologist: Javed Seaman MD  Preanesthetic Checklist  Completed: patient identified, IV checked, site marked, risks and benefits discussed, surgical consent, monitors and equipment checked, pre-op evaluation and timeout performed  Peripheral Block  Patient position: supine  Prep: ChloraPrep  Patient monitoring: continuous pulse ox and frequent blood pressure checks  Block type: popliteal  Laterality: right  Injection technique: single-shot  Procedures: ultrasound guided, Ultrasound guidance required for the procedure to increase accuracy and safety of medication placement and decrease risk of complications    Ultrasound permanent image saved  Needle  Needle type: Stimuplex   Needle gauge: 21 G  Needle length: 10 cm  Needle localization: ultrasound guidance  Needle insertion depth: 3 cm  Test dose: negative  Assessment  Injection assessment: incremental injection and local visualized surrounding nerve on ultrasound  Paresthesia pain: none  Heart rate change: no  Slow fractionated injection: yes  Post-procedure:  site cleaned  patient tolerated the procedure well with no immediate complications

## 2022-11-14 NOTE — PROGRESS NOTES
Right Popliteal Nerve Block Note:    Time out preformed  Patient and consent verified  Laterality verified  Patient sedated on standard ASA monitors  Patient prepped in sterile fashion  Needle advanced under ultrasound guidance  25 mL of 0 5% Bupivacaine injected after negative aspiration in 5 cc increments for popliteal block  Image stored  No complications apparent  VSS

## 2022-11-14 NOTE — ANESTHESIA POSTPROCEDURE EVALUATION
Post-Op Assessment Note    CV Status:  Stable  Pain Score: 6    Pain management: adequate     Mental Status:  Alert and awake   Hydration Status:  Euvolemic   PONV Controlled:  Controlled   Airway Patency:  Patent      Post Op Vitals Reviewed: Yes      Staff: Anesthesiologist         No complications documented      BP   132/77   Temp     Pulse  69   Resp   20   SpO2   97% on RA

## 2023-01-09 RX ORDER — METHOCARBAMOL 500 MG/1
TABLET, FILM COATED ORAL
COMMUNITY
End: 2023-01-10 | Stop reason: ALTCHOICE

## 2023-01-09 RX ORDER — DIAZEPAM 5 MG/1
TABLET ORAL
COMMUNITY
End: 2023-01-10 | Stop reason: ALTCHOICE

## 2023-01-09 RX ORDER — PREDNISONE 10 MG/1
TABLET ORAL
COMMUNITY
End: 2023-01-10 | Stop reason: ALTCHOICE

## 2023-01-09 RX ORDER — BUPROPION HYDROCHLORIDE 150 MG/1
TABLET ORAL
COMMUNITY
End: 2023-01-10 | Stop reason: ALTCHOICE

## 2023-01-09 RX ORDER — LIDOCAINE 50 MG/G
PATCH TOPICAL
COMMUNITY
End: 2023-01-10 | Stop reason: ALTCHOICE

## 2023-01-09 RX ORDER — DOXYCYCLINE HYCLATE 100 MG
TABLET ORAL
COMMUNITY
End: 2023-01-10 | Stop reason: ALTCHOICE

## 2023-01-10 ENCOUNTER — OFFICE VISIT (OUTPATIENT)
Dept: FAMILY MEDICINE CLINIC | Facility: CLINIC | Age: 58
End: 2023-01-10

## 2023-01-10 VITALS
DIASTOLIC BLOOD PRESSURE: 98 MMHG | OXYGEN SATURATION: 99 % | RESPIRATION RATE: 16 BRPM | SYSTOLIC BLOOD PRESSURE: 138 MMHG | HEIGHT: 61 IN | BODY MASS INDEX: 36.47 KG/M2 | HEART RATE: 92 BPM

## 2023-01-10 DIAGNOSIS — M79.7 FIBROMYALGIA: ICD-10-CM

## 2023-01-10 DIAGNOSIS — Z79.899 OTHER LONG TERM (CURRENT) DRUG THERAPY: ICD-10-CM

## 2023-01-10 DIAGNOSIS — E66.01 CLASS 2 SEVERE OBESITY DUE TO EXCESS CALORIES WITH SERIOUS COMORBIDITY AND BODY MASS INDEX (BMI) OF 36.0 TO 36.9 IN ADULT (HCC): ICD-10-CM

## 2023-01-10 DIAGNOSIS — I15.8 OTHER SECONDARY HYPERTENSION: ICD-10-CM

## 2023-01-10 DIAGNOSIS — M46.1 SACROILIITIS (HCC): ICD-10-CM

## 2023-01-10 DIAGNOSIS — M48.062 SPINAL STENOSIS OF LUMBAR REGION WITH NEUROGENIC CLAUDICATION: ICD-10-CM

## 2023-01-10 DIAGNOSIS — M92.61 HAGLUND'S DEFORMITY, RIGHT: Primary | ICD-10-CM

## 2023-01-10 DIAGNOSIS — G57.01 PIRIFORMIS SYNDROME OF RIGHT SIDE: ICD-10-CM

## 2023-01-10 DIAGNOSIS — E61.1 IRON DEFICIENCY: ICD-10-CM

## 2023-01-10 DIAGNOSIS — F33.1 MODERATE EPISODE OF RECURRENT MAJOR DEPRESSIVE DISORDER (HCC): ICD-10-CM

## 2023-01-10 PROBLEM — E66.812 CLASS 2 SEVERE OBESITY DUE TO EXCESS CALORIES WITH SERIOUS COMORBIDITY AND BODY MASS INDEX (BMI) OF 36.0 TO 36.9 IN ADULT (HCC): Status: ACTIVE | Noted: 2023-01-10

## 2023-01-10 RX ORDER — BACLOFEN 20 MG/1
20 TABLET ORAL 2 TIMES DAILY PRN
Qty: 180 TABLET | Refills: 1 | Status: SHIPPED | OUTPATIENT
Start: 2023-01-10

## 2023-01-10 RX ORDER — ACETAMINOPHEN AND CODEINE PHOSPHATE 300; 30 MG/1; MG/1
1 TABLET ORAL EVERY 6 HOURS PRN
Qty: 30 TABLET | Refills: 0 | Status: SHIPPED | OUTPATIENT
Start: 2023-01-10

## 2023-01-10 RX ORDER — VALSARTAN 320 MG/1
320 TABLET ORAL DAILY
Qty: 90 TABLET | Refills: 1 | Status: SHIPPED | OUTPATIENT
Start: 2023-01-10

## 2023-01-10 NOTE — PATIENT INSTRUCTIONS
Medicare Preventive Visit Patient Instructions  Thank you for completing your Welcome to Medicare Visit or Medicare Annual Wellness Visit today  Your next wellness visit will be due in one year (1/11/2024)  The screening/preventive services that you may require over the next 5-10 years are detailed below  Some tests may not apply to you based off risk factors and/or age  Screening tests ordered at today's visit but not completed yet may show as past due  Also, please note that scanned in results may not display below  Preventive Screenings:  Service Recommendations Previous Testing/Comments   Colorectal Cancer Screening  * Colonoscopy    * Fecal Occult Blood Test (FOBT)/Fecal Immunochemical Test (FIT)  * Fecal DNA/Cologuard Test  * Flexible Sigmoidoscopy Age: 39-70 years old   Colonoscopy: every 10 years (may be performed more frequently if at higher risk)  OR  FOBT/FIT: every 1 year  OR  Cologuard: every 3 years  OR  Sigmoidoscopy: every 5 years  Screening may be recommended earlier than age 39 if at higher risk for colorectal cancer  Also, an individualized decision between you and your healthcare provider will decide whether screening between the ages of 74-80 would be appropriate  Colonoscopy: 12/31/2019  FOBT/FIT: Not on file  Cologuard: Not on file  Sigmoidoscopy: Not on file    Screening Current     Breast Cancer Screening Age: 36 years old  Frequency: every 1-2 years  Not required if history of left and right mastectomy Mammogram: 09/24/2019        Cervical Cancer Screening Between the ages of 21-29, pap smear recommended once every 3 years  Between the ages of 33-67, can perform pap smear with HPV co-testing every 5 years     Recommendations may differ for women with a history of total hysterectomy, cervical cancer, or abnormal pap smears in past  Pap Smear: Not on file        Hepatitis C Screening Once for adults born between 1945 and 1965  More frequently in patients at high risk for Hepatitis C Hep C Antibody: 01/18/2021    Screening Current   Diabetes Screening 1-2 times per year if you're at risk for diabetes or have pre-diabetes Fasting glucose: 88 mg/dL (11/9/2022)  A1C: 5 7 % (11/9/2022)  Screening Current   Cholesterol Screening Once every 5 years if you don't have a lipid disorder  May order more often based on risk factors  Lipid panel: 01/31/2022    Screening Current     Other Preventive Screenings Covered by Medicare:  1  Abdominal Aortic Aneurysm (AAA) Screening: covered once if your at risk  You're considered to be at risk if you have a family history of AAA  2  Lung Cancer Screening: covers low dose CT scan once per year if you meet all of the following conditions: (1) Age 50-69; (2) No signs or symptoms of lung cancer; (3) Current smoker or have quit smoking within the last 15 years; (4) You have a tobacco smoking history of at least 20 pack years (packs per day multiplied by number of years you smoked); (5) You get a written order from a healthcare provider  3  Glaucoma Screening: covered annually if you're considered high risk: (1) You have diabetes OR (2) Family history of glaucoma OR (3)  aged 48 and older OR (3)  American aged 72 and older  3  Osteoporosis Screening: covered every 2 years if you meet one of the following conditions: (1) You're estrogen deficient and at risk for osteoporosis based off medical history and other findings; (2) Have a vertebral abnormality; (3) On glucocorticoid therapy for more than 3 months; (4) Have primary hyperparathyroidism; (5) On osteoporosis medications and need to assess response to drug therapy  · Last bone density test (DXA Scan): 03/23/2022   5  HIV Screening: covered annually if you're between the age of 15-65  Also covered annually if you are younger than 13 and older than 72 with risk factors for HIV infection  For pregnant patients, it is covered up to 3 times per pregnancy      Immunizations:  Immunization Recommendations   Influenza Vaccine Annual influenza vaccination during flu season is recommended for all persons aged >= 6 months who do not have contraindications   Pneumococcal Vaccine   * Pneumococcal conjugate vaccine = PCV13 (Prevnar 13), PCV15 (Vaxneuvance), PCV20 (Prevnar 20)  * Pneumococcal polysaccharide vaccine = PPSV23 (Pneumovax) Adults 25-60 years old: 1-3 doses may be recommended based on certain risk factors  Adults 72 years old: 1-2 doses may be recommended based off what pneumonia vaccine you previously received   Hepatitis B Vaccine 3 dose series if at intermediate or high risk (ex: diabetes, end stage renal disease, liver disease)   Tetanus (Td) Vaccine - COST NOT COVERED BY MEDICARE PART B Following completion of primary series, a booster dose should be given every 10 years to maintain immunity against tetanus  Td may also be given as tetanus wound prophylaxis  Tdap Vaccine - COST NOT COVERED BY MEDICARE PART B Recommended at least once for all adults  For pregnant patients, recommended with each pregnancy  Shingles Vaccine (Shingrix) - COST NOT COVERED BY MEDICARE PART B  2 shot series recommended in those aged 48 and above     Health Maintenance Due:      Topic Date Due   • Cervical Cancer Screening  Never done   • Breast Cancer Screening: Mammogram  09/24/2020   • Colorectal Cancer Screening  12/31/2024   • HIV Screening  Completed   • Hepatitis C Screening  Completed     Immunizations Due:      Topic Date Due   • COVID-19 Vaccine (1) Never done     Advance Directives   What are advance directives? Advance directives are legal documents that state your wishes and plans for medical care  These plans are made ahead of time in case you lose your ability to make decisions for yourself  Advance directives can apply to any medical decision, such as the treatments you want, and if you want to donate organs  What are the types of advance directives?   There are many types of advance directives, and each state has rules about how to use them  You may choose a combination of any of the following:  · Living will: This is a written record of the treatment you want  You can also choose which treatments you do not want, which to limit, and which to stop at a certain time  This includes surgery, medicine, IV fluid, and tube feedings  · Durable power of  for healthcare [facility]): This is a written record that states who you want to make healthcare choices for you when you are unable to make them for yourself  This person, called a proxy, is usually a family member or a friend  You may choose more than 1 proxy  · Do not resuscitate (DNR) order:  A DNR order is used in case your heart stops beating or you stop breathing  It is a request not to have certain forms of treatment, such as CPR  A DNR order may be included in other types of advance directives  · Medical directive: This covers the care that you want if you are in a coma, near death, or unable to make decisions for yourself  You can list the treatments you want for each condition  Treatment may include pain medicine, surgery, blood transfusions, dialysis, IV or tube feedings, and a ventilator (breathing machine)  · Values history: This document has questions about your views, beliefs, and how you feel and think about life  This information can help others choose the care that you would choose  Why are advance directives important? An advance directive helps you control your care  Although spoken wishes may be used, it is better to have your wishes written down  Spoken wishes can be misunderstood, or not followed  Treatments may be given even if you do not want them  An advance directive may make it easier for your family to make difficult choices about your care     Weight Management   Why it is important to manage your weight:  Being overweight increases your risk of health conditions such as heart disease, high blood pressure, type 2 diabetes, and certain types of cancer  It can also increase your risk for osteoarthritis, sleep apnea, and other respiratory problems  Aim for a slow, steady weight loss  Even a small amount of weight loss can lower your risk of health problems  How to lose weight safely:  A safe and healthy way to lose weight is to eat fewer calories and get regular exercise  You can lose up about 1 pound a week by decreasing the number of calories you eat by 500 calories each day  Healthy meal plan for weight management:  A healthy meal plan includes a variety of foods, contains fewer calories, and helps you stay healthy  A healthy meal plan includes the following:  · Eat whole-grain foods more often  A healthy meal plan should contain fiber  Fiber is the part of grains, fruits, and vegetables that is not broken down by your body  Whole-grain foods are healthy and provide extra fiber in your diet  Some examples of whole-grain foods are whole-wheat breads and pastas, oatmeal, brown rice, and bulgur  · Eat a variety of vegetables every day  Include dark, leafy greens such as spinach, kale, agnieszka greens, and mustard greens  Eat yellow and orange vegetables such as carrots, sweet potatoes, and winter squash  · Eat a variety of fruits every day  Choose fresh or canned fruit (canned in its own juice or light syrup) instead of juice  Fruit juice has very little or no fiber  · Eat low-fat dairy foods  Drink fat-free (skim) milk or 1% milk  Eat fat-free yogurt and low-fat cottage cheese  Try low-fat cheeses such as mozzarella and other reduced-fat cheeses  · Choose meat and other protein foods that are low in fat  Choose beans or other legumes such as split peas or lentils  Choose fish, skinless poultry (chicken or turkey), or lean cuts of red meat (beef or pork)  Before you cook meat or poultry, cut off any visible fat  · Use less fat and oil  Try baking foods instead of frying them   Add less fat, such as margarine, sour cream, regular salad dressing and mayonnaise to foods  Eat fewer high-fat foods  Some examples of high-fat foods include french fries, doughnuts, ice cream, and cakes  · Eat fewer sweets  Limit foods and drinks that are high in sugar  This includes candy, cookies, regular soda, and sweetened drinks  Exercise:  Exercise at least 30 minutes per day on most days of the week  Some examples of exercise include walking, biking, dancing, and swimming  You can also fit in more physical activity by taking the stairs instead of the elevator or parking farther away from stores  Ask your healthcare provider about the best exercise plan for you  © Copyright [employer] 2018 Information is for End User's use only and may not be sold, redistributed or otherwise used for commercial purposes   All illustrations and images included in CareNotes® are the copyrighted property of [employer]  or [address] [place] [address]

## 2023-01-10 NOTE — PROGRESS NOTES
Assessment and Plan:     Problem List Items Addressed This Visit        Nervous and Auditory    Piriformis syndrome of right side    Relevant Medications    baclofen 20 mg tablet       Musculoskeletal and Integument    Sacroiliitis (HCC)    Relevant Orders    HEMOGLOBIN A1C W/ EAG ESTIMATION    Magnesium    CBC and differential    Comprehensive metabolic panel       Other    Iron deficiency    Moderate episode of recurrent major depressive disorder (HCC)    Fibromyalgia    Relevant Orders    EMG 1 Limb    Magnesium    CBC and differential    Comprehensive metabolic panel    Spinal stenosis of lumbar region with neurogenic claudication    Relevant Orders    EMG 1 Limb    HEMOGLOBIN A1C W/ EAG ESTIMATION    Magnesium    CBC and differential    Comprehensive metabolic panel    Class 2 severe obesity due to excess calories with serious comorbidity and body mass index (BMI) of 36 0 to 36 9 in adult [facility])   Other Visit Diagnoses     Frandy's deformity, right    -  Primary    Relevant Medications    acetaminophen-codeine (TYLENOL #3) 300-30 mg per tablet    Other Relevant Orders    EMG 1 Limb    HEMOGLOBIN A1C W/ EAG ESTIMATION    Magnesium    CBC and differential    Comprehensive metabolic panel    Other long term (current) drug therapy        Relevant Orders    HEMOGLOBIN A1C W/ EAG ESTIMATION    Other secondary hypertension        Relevant Medications    valsartan (DIOVAN) 320 MG tablet          Sending her for the EMG   Also increase the htn med   Watch for dizziness    BMI Counseling: Body mass index is 36 47 kg/m²  The BMI is above normal  Nutrition recommendations include decreasing portion sizes, encouraging healthy choices of fruits and vegetables, decreasing fast food intake, consuming healthier snacks and limiting drinks that contain sugar  Exercise recommendations include exercising 3-5 times per week  No pharmacotherapy was ordered   Rationale for BMI follow-up plan is due to patient being overweight or obese        Preventive health issues were discussed with patient, and age appropriate screening tests were ordered as noted in patient's After Visit Summary  Personalized health advice and appropriate referrals for health education or preventive services given if needed, as noted in patient's After Visit Summary  History of Present Illness:     Patient presents for a Medicare Wellness Visit    HPI     Here to go over chronic issues and labs / imaging studies if applicable  s/p haglunds repair right heel   With PA foot and ankle   11/14/22   On [address]       Patient Care Team:  Yamini Christie MD as PCP - General (Family Medicine)  Yamini Christie MD as PCP - [address] [place] [address] (RTE)  Yamini Christie MD as PCP - PCP-[employer] (RTE)     Review of Systems:     Review of Systems   Constitutional: Negative for fever and unexpected weight change  HENT: Negative for nosebleeds and trouble swallowing  Eyes: Negative for visual disturbance  Respiratory: Negative for chest tightness and shortness of breath  Cardiovascular: Negative for chest pain, palpitations and leg swelling  Gastrointestinal: Negative for abdominal pain, constipation, diarrhea and nausea  Endocrine: Negative for cold intolerance  Genitourinary: Negative for dysuria and urgency  Musculoskeletal: Positive for arthralgias, back pain and gait problem  Negative for joint swelling and myalgias  Skin: Negative for rash  Neurological: Negative for tremors, seizures and syncope  Hematological: Does not bruise/bleed easily  Psychiatric/Behavioral: Positive for dysphoric mood  Negative for hallucinations and suicidal ideas          Problem List:     Patient Active Problem List   Diagnosis   • Colon polyp   • Internal hemorrhoids   • Family history of colorectal cancer   • Vitamin D deficiency   • Iron deficiency   • H/O gastric bypass   • B12 deficiency   • Moderate episode of recurrent major depressive disorder ([facility] [address] ) • Fibromyalgia   • Chronic low back pain with left-sided sciatica   • Bilateral hip pain   • Obesity, Class II, BMI 35-39 9   • Encounter for surgical aftercare following surgery of digestive system   • Postsurgical malabsorption   • Constipation   • Bariatric surgery status   • Sacroiliitis ([address] )   • Piriformis syndrome of left side   • Piriformis syndrome of right side   • Trochanteric bursitis of both hips   • Lumbar facet arthropathy   • Osteopenia   • Anxiety   • Lumbar foraminal stenosis   • Spinal stenosis of lumbar region with neurogenic claudication   • Chronic pain syndrome   • Spasm of right piriformis muscle   • Insomnia   • Primary hypertension   • On tricyclic antidepressant therapy   • CPAP (continuous positive airway pressure) dependence   • Sleep apnea   • Class 2 severe obesity due to excess calories with serious comorbidity and body mass index (BMI) of 36 0 to 36 9 in [facility])      Past Medical and Surgical History:     Past Medical History:   Diagnosis Date   • Anemia     hx of iron   • Anxiety    • Chronic pain disorder     back radiates down legs   • Colon polyp    • COVID 03/2020   • CPAP (continuous positive airway pressure) dependence    • Fibromyalgia    • Fibromyalgia, primary    • Hypertension    • Lumbar radiculopathy    • Medical history unknown    • Osteopenia    • Partial tear of right Achilles tendon    • PONV (postoperative nausea and vomiting)    • Sleep apnea    • Spinal stenosis    • Staph infection     fingers years ago   • Use of cane as ambulatory aid      Past Surgical History:   Procedure Laterality Date   • ACHILLES TENDON SURGERY Right 11/14/2022    Procedure: ACHILLES TENDON PARITAL TEAR REPAIR;  Surgeon: Anuradha Nunes DPM;  Location: [address] [place] [address] MAIN OR;  Service: Podiatry   • BUNIONECTOMY Right 11/14/2022    Procedure: [place] Means;  Surgeon: Anuradha Nunes DPM;  Location:  MAIN OR;  Service: Podiatry   • CARPAL TUNNEL RELEASE Bilateral     21 y o  with left ulnar nerve release  • CARPAL TUNNEL RELEASE Bilateral    •  SECTION     • COLONOSCOPY     • ELBOW SURGERY      Tennis elbow   • GASTRIC BYPASS     • CT MILAN IMPLTJ NSTIM ELTRDS PLATE/PADDLE EDRL Left 08/15/2022    Procedure: Thoracic laminectomies for placement of spinal cord stimulator lead and internal pulse generator, left-sided pulse generator, with neuro monitoring;  Surgeon: Taryn Blanco MD;  Location:  MAIN OR;  Service: Neurosurgery   • REDUCTION MAMMAPLASTY Bilateral    • SPINAL CORD STIMULATOR IMPLANT     • WISDOM TOOTH EXTRACTION        Family History:     Family History   Problem Relation Age of Onset   • Ovarian cancer Mother    • Heart disease Mother    • Colon cancer Father    • No Known Problems Maternal Grandmother    • No Known Problems Maternal Grandfather    • Diabetes Paternal Grandmother    • No Known Problems Paternal Grandfather    • No Known Problems Son    • Leukemia Maternal Uncle    • Diabetes Paternal Aunt    • Diabetes Paternal Uncle       Social History:     Social History     Socioeconomic History   • Marital status:      Spouse name: None   • Number of children: None   • Years of education: None   • Highest education level: None   Occupational History   • None   Tobacco Use   • Smoking status: Never   • Smokeless tobacco: Never   Vaping Use   • Vaping Use: Never used   Substance and Sexual Activity   • Alcohol use: Yes     Comment: a glass of wine every now & then   • Drug use: Never   • Sexual activity: None   Other Topics Concern   • None   Social History Narrative   • None     Social Determinants of Health     Financial Resource Strain: Low Risk    • Difficulty of Paying Living Expenses: Not hard at all   Food Insecurity: Not on file   Transportation Needs: No Transportation Needs   • Lack of Transportation (Medical): No   • Lack of Transportation (Non-Medical):  No   Physical Activity: Not on file   Stress: Not on file   Social Connections: Not on file Intimate Partner Violence: Not on file   Housing Stability: Not on file      Medications and Allergies:     Current Outpatient Medications   Medication Sig Dispense Refill   • acetaminophen-codeine (TYLENOL #3) 300-30 mg per tablet Take 1 tablet by mouth every 6 (six) hours as needed for moderate pain 30 tablet 0   • baclofen 20 mg tablet Take 1 tablet (20 mg total) by mouth 2 (two) times a day as needed for muscle spasms 180 tablet 1   • [facility] Vitamin C 500 MG tablet TAKE 1 TABLET (500 MG TOTAL) BY MOUTH DAILY  90 tablet 2   • doxepin (SINEquan) 150 MG capsule Take 1 capsule (150 mg total) by mouth daily at bedtime 90 capsule 1   • ergocalciferol (VITAMIN D2) 50,000 units TAKE 1 CAPSULE (50,000 UNITS TOTAL) BY MOUTH EVERY 28 DAYS 12 capsule 0   • gabapentin (NEURONTIN) 300 mg capsule Take 1 capsule (300 mg total) by mouth 3 (three) times a day In addition to 600 mg TID for a total of 900 mg  capsule 1   • gabapentin (Neurontin) 600 MG tablet Take 1 tablet (600 mg total) by mouth 3 (three) times a day 270 tablet 1   • valsartan (DIOVAN) 320 MG tablet Take 1 tablet (320 mg total) by mouth daily 90 tablet 1   • vitamin B-12 (VITAMIN B-12) 500 mcg tablet Take 1 tablet (500 mcg total) by mouth daily 90 tablet 1     No current facility-administered medications for this visit       No Known Allergies   Immunizations:     Immunization History   Administered Date(s) Administered   • H1N1, All Formulations 01/15/2010   • INFLUENZA 11/03/2022   • Influenza, recombinant, quadrivalent,injectable, preservative free 01/19/2021, 11/22/2021, 11/03/2022      Health Maintenance:         Topic Date Due   • Cervical Cancer Screening  Never done   • Breast Cancer Screening: Mammogram  09/24/2020   • Colorectal Cancer Screening  12/31/2024   • HIV Screening  Completed   • Hepatitis C Screening  Completed         Topic Date Due   • COVID-19 Vaccine (1) Never done      Medicare Screening Tests and Risk Assessments:     Xin Mcdonnell is here for her Welcome to Medicare visit  Health Risk Assessment:   Patient rates overall health as good  Patient feels that their physical health rating is same  Patient is very satisfied with their life  Eyesight was rated as same  Hearing was rated as same  Patient feels that their emotional and mental health rating is same  Patients states they are never, rarely angry  Patient states they are often unusually tired/fatigued  Pain experienced in the last 7 days has been a lot  Patient's pain rating has been 8/10  Patient states that she has experienced no weight loss or gain in last 6 months  Depression Screening:   PHQ-9 Score: 0      Fall Risk Screening: In the past year, patient has experienced: history of falling in past year    Number of falls: 1  Injured during fall?: Yes    Feels unsteady when standing or walking?: No    Worried about falling?: No      Urinary Incontinence Screening:   Patient has not leaked urine accidently in the last six months  Home Safety:  Patient has trouble with stairs inside or outside of their home  Patient has working smoke alarms and has working carbon monoxide detector  Home safety hazards include: none  Nutrition:   Current diet is Regular  Medications:   Patient is currently taking over-the-counter supplements  OTC medications include: see medication list  Patient is able to manage medications  Activities of Daily Living (ADLs)/Instrumental Activities of Daily Living (IADLs):   Walk and transfer into and out of bed and chair?: Yes  Dress and groom yourself?: Yes    Bathe or shower yourself?: Yes    Feed yourself?  Yes  Do your laundry/housekeeping?: Yes  Manage your money, pay your bills and track your expenses?: Yes  Make your own meals?: Yes    Do your own shopping?: Yes    Previous Hospitalizations:   Any hospitalizations or ED visits within the last 12 months?: Yes    How many hospitalizations have you had in the last year?: 1-2    Advance Care Planning:   Living will: No    Durable POA for healthcare: No    Advanced directive: No    Five wishes given: No      Cognitive Screening:   Provider or family/friend/caregiver concerned regarding cognition?: No    PREVENTIVE SCREENINGS      Cardiovascular Screening:    General: Screening Current      Diabetes Screening:     General: Screening Current      Colorectal Cancer Screening:     General: Screening Current      Breast Cancer Screening:       Due for: Mammogram        Cervical Cancer Screening:    General: Screening Not Indicated      Osteoporosis Screening:    General: Screening Not Indicated and History Osteoporosis      Abdominal Aortic Aneurysm (AAA) Screening:        General: Screening Not Indicated      Lung Cancer Screening:     General: Screening Not Indicated      Hepatitis C Screening:    General: Screening Current    Screening, Brief Intervention, and Referral to Treatment (SBIRT)    Screening  Typical number of drinks in a day: 0  Typical number of drinks in a week: 0  Interpretation: Low risk drinking behavior  Single Item Drug Screening:  How often have you used an illegal drug (including marijuana) or a prescription medication for non-medical reasons in the past year? never    Single Item Drug Screen Score: 0  Interpretation: Negative screen for possible drug use disorder    Brief Intervention  Alcohol & drug use screenings were reviewed  No concerns regarding substance use disorder identified  Vision Screening    Right eye Left eye Both eyes   Without correction 20/30 20/50 20/30   With correction      Comments: Didn't have glasses on her but does wear them          Physical Exam:     /98 (BP Location: Left arm, Patient Position: Sitting, Cuff Size: Standard)   Pulse 92   Resp 16   Ht 5' 1" (1 549 m)   SpO2 99%   BMI 36 47 kg/m²     Physical Exam  Vitals and nursing note reviewed  Constitutional:       Appearance: She is well-developed  She is obese        Comments: Boot right foot    HENT:      Head: Normocephalic and atraumatic  Right Ear: External ear normal       Left Ear: External ear normal       Nose: Nose normal    Eyes:      Conjunctiva/sclera: Conjunctivae normal       Pupils: Pupils are equal, round, and reactive to light  Cardiovascular:      Rate and Rhythm: Normal rate and regular rhythm  Heart sounds: Normal heart sounds  No murmur heard  Pulmonary:      Effort: Pulmonary effort is normal       Breath sounds: Normal breath sounds  No wheezing  Abdominal:      General: Bowel sounds are normal       Palpations: Abdomen is soft  Musculoskeletal:         General: Tenderness present  Normal range of motion  Cervical back: Normal range of motion and neck supple  Lymphadenopathy:      Cervical: No cervical adenopathy  Skin:     General: Skin is warm and dry  Capillary Refill: Capillary refill takes less than 2 seconds  Neurological:      Mental Status: She is alert and oriented to person, place, and time  Gait: Gait abnormal    Psychiatric:         Behavior: Behavior normal          Thought Content:  Thought content normal          Judgment: Judgment normal           Lobo Valiente MD

## 2023-01-15 DIAGNOSIS — F33.1 MODERATE EPISODE OF RECURRENT MAJOR DEPRESSIVE DISORDER (HCC): ICD-10-CM

## 2023-01-16 ENCOUNTER — APPOINTMENT (OUTPATIENT)
Dept: LAB | Facility: HOSPITAL | Age: 58
End: 2023-01-16

## 2023-01-16 DIAGNOSIS — M48.062 SPINAL STENOSIS OF LUMBAR REGION WITH NEUROGENIC CLAUDICATION: ICD-10-CM

## 2023-01-16 DIAGNOSIS — Z79.899 OTHER LONG TERM (CURRENT) DRUG THERAPY: ICD-10-CM

## 2023-01-16 DIAGNOSIS — M46.1 SACROILIITIS (HCC): ICD-10-CM

## 2023-01-16 DIAGNOSIS — M79.7 FIBROMYALGIA: ICD-10-CM

## 2023-01-16 DIAGNOSIS — M92.61 HAGLUND'S DEFORMITY, RIGHT: ICD-10-CM

## 2023-01-16 LAB
ALBUMIN SERPL BCP-MCNC: 4 G/DL (ref 3.5–5)
ALP SERPL-CCNC: 73 U/L (ref 34–104)
ALT SERPL W P-5'-P-CCNC: 30 U/L (ref 7–52)
ANION GAP SERPL CALCULATED.3IONS-SCNC: 8 MMOL/L (ref 4–13)
AST SERPL W P-5'-P-CCNC: 22 U/L (ref 13–39)
BASOPHILS # BLD AUTO: 0.03 THOUSANDS/ÂΜL (ref 0–0.1)
BASOPHILS NFR BLD AUTO: 0 % (ref 0–1)
BILIRUB SERPL-MCNC: 0.5 MG/DL (ref 0.2–1)
BUN SERPL-MCNC: 14 MG/DL (ref 5–25)
CALCIUM SERPL-MCNC: 8.9 MG/DL (ref 8.4–10.2)
CHLORIDE SERPL-SCNC: 102 MMOL/L (ref 96–108)
CO2 SERPL-SCNC: 27 MMOL/L (ref 21–32)
CREAT SERPL-MCNC: 0.63 MG/DL (ref 0.6–1.3)
EOSINOPHIL # BLD AUTO: 0.11 THOUSAND/ÂΜL (ref 0–0.61)
EOSINOPHIL NFR BLD AUTO: 2 % (ref 0–6)
ERYTHROCYTE [DISTWIDTH] IN BLOOD BY AUTOMATED COUNT: 11.7 % (ref 11.6–15.1)
EST. AVERAGE GLUCOSE BLD GHB EST-MCNC: 108 MG/DL
GFR SERPL CREATININE-BSD FRML MDRD: 99 ML/MIN/1.73SQ M
GLUCOSE P FAST SERPL-MCNC: 91 MG/DL (ref 65–99)
HBA1C MFR BLD: 5.4 %
HCT VFR BLD AUTO: 39.4 % (ref 34.8–46.1)
HGB BLD-MCNC: 12.6 G/DL (ref 11.5–15.4)
IMM GRANULOCYTES # BLD AUTO: 0.01 THOUSAND/UL (ref 0–0.2)
IMM GRANULOCYTES NFR BLD AUTO: 0 % (ref 0–2)
LYMPHOCYTES # BLD AUTO: 1.39 THOUSANDS/ÂΜL (ref 0.6–4.47)
LYMPHOCYTES NFR BLD AUTO: 21 % (ref 14–44)
MAGNESIUM SERPL-MCNC: 1.8 MG/DL (ref 1.9–2.7)
MCH RBC QN AUTO: 30.3 PG (ref 26.8–34.3)
MCHC RBC AUTO-ENTMCNC: 32 G/DL (ref 31.4–37.4)
MCV RBC AUTO: 95 FL (ref 82–98)
MONOCYTES # BLD AUTO: 0.52 THOUSAND/ÂΜL (ref 0.17–1.22)
MONOCYTES NFR BLD AUTO: 8 % (ref 4–12)
NEUTROPHILS # BLD AUTO: 4.68 THOUSANDS/ÂΜL (ref 1.85–7.62)
NEUTS SEG NFR BLD AUTO: 69 % (ref 43–75)
NRBC BLD AUTO-RTO: 0 /100 WBCS
PLATELET # BLD AUTO: 254 THOUSANDS/UL (ref 149–390)
PMV BLD AUTO: 9.8 FL (ref 8.9–12.7)
POTASSIUM SERPL-SCNC: 3.6 MMOL/L (ref 3.5–5.3)
PROT SERPL-MCNC: 7.2 G/DL (ref 6.4–8.4)
RBC # BLD AUTO: 4.16 MILLION/UL (ref 3.81–5.12)
SODIUM SERPL-SCNC: 137 MMOL/L (ref 135–147)
WBC # BLD AUTO: 6.74 THOUSAND/UL (ref 4.31–10.16)

## 2023-01-16 RX ORDER — DOXEPIN HYDROCHLORIDE 150 MG/1
150 CAPSULE ORAL
Qty: 30 CAPSULE | Refills: 5 | Status: SHIPPED | OUTPATIENT
Start: 2023-01-16

## 2023-01-17 ENCOUNTER — TELEPHONE (OUTPATIENT)
Dept: FAMILY MEDICINE CLINIC | Facility: CLINIC | Age: 58
End: 2023-01-17

## 2023-01-17 NOTE — TELEPHONE ENCOUNTER
----- Message from Guille Bauman MD sent at 1/17/2023  4:57 AM EST -----  Take magnesium 250mg once daily

## 2023-01-18 ENCOUNTER — HOSPITAL ENCOUNTER (OUTPATIENT)
Dept: RADIOLOGY | Facility: MEDICAL CENTER | Age: 58
Discharge: HOME | End: 2023-01-18

## 2023-01-18 VITALS — BODY MASS INDEX: 36.44 KG/M2 | WEIGHT: 193 LBS | HEIGHT: 61 IN

## 2023-01-18 DIAGNOSIS — Z12.31 ENCOUNTER FOR SCREENING MAMMOGRAM FOR MALIGNANT NEOPLASM OF BREAST: ICD-10-CM

## 2023-01-18 DIAGNOSIS — Z12.31 ENCOUNTER FOR SCREENING MAMMOGRAM FOR BREAST CANCER: ICD-10-CM

## 2023-01-25 DIAGNOSIS — M92.61 HAGLUND'S DEFORMITY, RIGHT: ICD-10-CM

## 2023-01-28 ENCOUNTER — NURSE TRIAGE (OUTPATIENT)
Dept: OTHER | Facility: OTHER | Age: 58
End: 2023-01-28

## 2023-01-28 NOTE — TELEPHONE ENCOUNTER
Regarding: post procedure pain  ----- Message from Jim Rodas sent at 1/28/2023 12:06 PM EST -----  " I had a nerve ending test on Tuesday  I have been in pain ever since, my doctor was supposed to send more pain meds to the pharmacy for me, however it's still not there   Now I am in a lot of pain and don't know what to do "

## 2023-01-28 NOTE — TELEPHONE ENCOUNTER
called in stating she has nerve ending test completed on Tuesday and she is having 6/10 right sciatic pain and shoots down leg  Pt requesting refill on tylenol-codeine or other recommendations for pain  states tried Motrin with no relief  states she reaching out to PCP  TC to on call  Advised ED   States ED doesn't do anything and wont go  Reason for Disposition  • [1] MODERATE pain (e g , interferes with normal activities, limping) AND [2] present > 3 days    Answer Assessment - Initial Assessment Questions  1  LOCATION and RADIATION: "Where is the pain located?"       Right side of buttocks down right leg   2  QUALITY: "What does the pain feel like?"  (e g , sharp, dull, aching, burning)     Constant   3  SEVERITY: "How bad is the pain?" "What does it keep you from doing?"   (Scale 1-10; or mild, moderate, severe)    -  MILD (1-3): doesn't interfere with normal activities     -  MODERATE (4-7): interferes with normal activities (e g , work or school) or awakens from sleep, limping     -  SEVERE (8-10): excruciating pain, unable to do any normal activities, unable to walk      6/10  4  ONSET: "When did the pain start?" "Does it come and go, or is it there all the time?"     Tuesday   5  WORK OR EXERCISE: "Has there been any recent work or exercise that involved this part of the body?"       Denies   6  CAUSE: "What do you think is causing the hip pain?"     nerve testing   7  AGGRAVATING FACTORS: "What makes the hip pain worse?" (e g , walking, climbing stairs, running)      Walking and every makes it worse   8   OTHER SYMPTOMS: "Do you have any other symptoms?" (e g , back pain, pain shooting down leg,  fever, rash)     Denies    Protocols used: HIP PAIN-ADULT-

## 2023-01-31 RX ORDER — ACETAMINOPHEN AND CODEINE PHOSPHATE 300; 30 MG/1; MG/1
1 TABLET ORAL EVERY 6 HOURS PRN
Qty: 30 TABLET | Refills: 0 | Status: SHIPPED | OUTPATIENT
Start: 2023-01-31

## 2023-02-02 ENCOUNTER — CONSULT (OUTPATIENT)
Dept: PULMONOLOGY | Facility: CLINIC | Age: 58
End: 2023-02-02

## 2023-02-02 VITALS
BODY MASS INDEX: 36.06 KG/M2 | HEART RATE: 83 BPM | TEMPERATURE: 97 F | HEIGHT: 61 IN | WEIGHT: 191 LBS | RESPIRATION RATE: 18 BRPM | OXYGEN SATURATION: 99 % | DIASTOLIC BLOOD PRESSURE: 120 MMHG | SYSTOLIC BLOOD PRESSURE: 170 MMHG

## 2023-02-02 DIAGNOSIS — G47.33 OSA (OBSTRUCTIVE SLEEP APNEA): ICD-10-CM

## 2023-02-02 RX ORDER — MULTIVITAMIN WITH IRON
TABLET ORAL
COMMUNITY
Start: 2023-01-17

## 2023-02-02 NOTE — LETTER
February 3, 2023     Roque Erazo MD  [address]  [address]  [address]    Patient: Mariano Caro   YOB: 1965   Date of Visit: 2/2/2023       Dear Dr Cruz Gómez: Thank you for referring Mariano Caro to me for evaluation  Below are my notes for this consultation  If you have questions, please do not hesitate to call me  I look forward to following your patient along with you  Sincerely,        Yuli Gil DO        CC: No Recipients  Yuli Gil DO  2/3/2023  8:18 AM  Sign when Signing Visit  Sleep Consultation   Mariano Caro 62 y o  female MRN: [id number]      Reason for consultation: NOAH    Requesting physician: Dr Cruz Gómez    Assessment/Plan  62 y o  F with PMHx of s/p gastric bypass, Fibromyalgia, Sacroilitis, Piriformis, syndrome, osteopenia, HTN, B12 deficiency, Vitamin D deficiency, anxiety, chronic pain and NOAH who comes in for NOAH management  1   NOAH noncompliant with CPAP - originally diagnosed many years ago prior to bariatric surgery  She has since gained weight again      -  Check a home study to assess for obstructive sleep apnea      -  I discussed in depth the diagnostic studies and treatment options involved with obstructive sleep apnea      -  I also discussed in depth the risk of leaving sleep apnea untreated including hypertension, heart failure, arrhythmia, MI and stroke  -  The patient is agreeable to undergo testing and treatment of obstructive sleep apnea  She understands that pitfalls she may encounter along the way and is willing to attempt CPAP treatment  2   Insufficient sleep/Profound sleep deprivation - she is only sleeping 4 hrs at night  She only sleeping 4 hrs a night  [place] 20  This is likely due to insufficient sleep  At next visit we will discuss her sleep schedule further and have her complete sleep diary        3   Fibromyalgia/chronic back pain - she does use baclofen, Tylenol 3 to help with the pain  This may potentially add to central sleep apnea as well  This too may impact her sleep schedule  History of Present Illness   HPI:  Nury Sheikh is a 62 y o  female with PMHx as below who comes in for management of insomnia and NOAH  Patient notes weight gain and symptoms of difficulty falling asleep, difficulty staying asleep, snoring, excessive daytime sleepiness with an [place] score of 20, awakenings with gasping, witnessed apneas, morning headaches, awakenings with dry mouth  she note symptoms of restless legs  she denies symptoms of cataplexy, sleep paralysis, hypnopompic or hypnagogic hallucinations  Sleep History:  she goes to bed at approximately 9 pm, will get to sleep in a few minutes, will get out of bed at 2 am   she will get sleep straight through those hours but then will not be able to get back to sleep  She does not typically take naps  ROS:   Review of Systems   Constitutional: Positive for fatigue  Negative for appetite change  HENT: Negative  Eyes: Negative  Respiratory: Negative  Cardiovascular: Negative  Gastrointestinal: Negative  Endocrine: Negative  Genitourinary: Negative  Musculoskeletal: Negative  Skin: Negative  Allergic/Immunologic: Negative  Neurological: Negative  Hematological: Negative  Psychiatric/Behavioral: Negative                Historical Information   Past Medical History:   Diagnosis Date   • Anemia     hx of iron   • Anxiety    • Cataract    • Chronic pain disorder     back radiates down legs   • Colon polyp    • COVID 03/2020   • CPAP (continuous positive airway pressure) dependence    • Fibromyalgia    • Fibromyalgia, primary    • Hypertension    • Lumbar radiculopathy    • Medical history unknown    • Osteopenia    • Partial tear of right Achilles tendon    • PONV (postoperative nausea and vomiting)    • Sleep apnea    • Sleep apnea, obstructive    • Spinal stenosis    • Staph infection fingers years ago   • Use of cane as ambulatory aid      Past Surgical History:   Procedure Laterality Date   • ACHILLES TENDON SURGERY Right 2022    Procedure: ACHILLES TENDON PARITAL TEAR REPAIR;  Surgeon: Malika Peacock DPM;  Location: [facility] MAIN OR;  Service: Podiatry   • BUNIONECTOMY Right 2022    Procedure: Emerald Torres;  Surgeon: Malika Peacock DPM;  Location:  MAIN OR;  Service: Podiatry   • CARPAL TUNNEL RELEASE Bilateral     21 y o  with left ulnar nerve release  • CARPAL TUNNEL RELEASE Bilateral    •  SECTION     • COLONOSCOPY     • ELBOW SURGERY      Tennis elbow   • GASTRIC BYPASS     • MO MILAN IMPLTJ NSTIM ELTRDS PLATE/PADDLE EDRL Left 08/15/2022    Procedure: Thoracic laminectomies for placement of spinal cord stimulator lead and internal pulse generator, left-sided pulse generator, with neuro monitoring;  Surgeon: Maia Cox MD;  Location:  MAIN OR;  Service: Neurosurgery   • REDUCTION MAMMAPLASTY Bilateral    • SPINAL CORD STIMULATOR IMPLANT     • WISDOM TOOTH EXTRACTION       Family History   Problem Relation Age of Onset   • Ovarian cancer Mother    • Heart disease Mother    • Asthma Mother    • COPD Mother    • Heart failure Mother    • Colon cancer Father    • Diabetes Father    • Diabetes Maternal Grandmother         Aunt   • No Known Problems Maternal Grandfather    • Diabetes Paternal Grandmother    • No Known Problems Paternal Grandfather    • No Known Problems Son    • Leukemia Maternal Uncle    • Diabetes Paternal Aunt    • Diabetes Paternal Uncle      Social History     Socioeconomic History   • Marital status:       Spouse name: Not on file   • Number of children: Not on file   • Years of education: Not on file   • Highest education level: Not on file   Occupational History   • Not on file   Tobacco Use   • Smoking status: Never   • Smokeless tobacco: Never   Vaping Use   • Vaping Use: Never used   Substance and Sexual Activity   • Alcohol use: Yes     Alcohol/week: 1 0 standard drink     Types: 1 Glasses of wine per week     Comment: Maybe one a month if that   • Drug use: Never   • Sexual activity: Not Currently     Partners: Male     Birth control/protection: Abstinence, None   Other Topics Concern   • Not on file   Social History Narrative   • Not on file     Social Determinants of Health     Financial Resource Strain: Low Risk    • Difficulty of Paying Living Expenses: Not hard at all   Food Insecurity: Not on file   Transportation Needs: No Transportation Needs   • Lack of Transportation (Medical): No   • Lack of Transportation (Non-Medical): No   Physical Activity: Not on file   Stress: Not on file   Social Connections: Not on file   Intimate Partner Violence: Not on file   Housing Stability: Not on file       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 Authorizing Provider   acetaminophen-codeine (TYLENOL #3) 300-30 mg per tablet Take 1 tablet by mouth every 6 (six) hours as needed for moderate pain 1/31/23  Yes Aaliyah Wang MD   baclofen 20 mg tablet Take 1 tablet (20 mg total) by mouth 2 (two) times a day as needed for muscle spasms 1/10/23  Yes Aaliyah Wang MD   [facility] Vitamin C 500 MG tablet TAKE 1 TABLET (500 MG TOTAL) BY MOUTH DAILY   10/18/22  Yes Aaliyah Wang MD   doxepin (SINEquan) 150 MG capsule TAKE 1 CAPSULE (150 MG TOTAL) BY MOUTH DAILY AT BEDTIME 1/16/23  Yes Aaliyah Wang MD   ergocalciferol (VITAMIN D2) 50,000 units TAKE 1 CAPSULE (50,000 UNITS TOTAL) BY MOUTH EVERY 28 DAYS 10/17/22  Yes Aaliyah Wang MD   Magnesium 250 MG TABS  1/17/23  Yes Historical Provider, MD   valsartan (DIOVAN) 320 MG tablet Take 1 tablet (320 mg total) by mouth daily 1/10/23  Yes Aaliyah Wang MD   vitamin B-12 (VITAMIN B-12) 500 mcg tablet Take 1 tablet (500 mcg total) by mouth daily 6/28/22  Yes Rosamaria Koyanagi Felecia Adams MD   gabapentin (NEURONTIN) 300 mg capsule Take 1 capsule (300 mg total) by mouth 3 (three) times a day In addition to 600 mg TID for a total of 900 mg TID  Patient not taking: Reported on 2/2/2023 6/28/22   Addy Petersen MD   gabapentin (Neurontin) 600 MG tablet Take 1 tablet (600 mg total) by mouth 3 (three) times a day  Patient not taking: Reported on 2/2/2023 6/28/22   Addy Petersen MD       Vitals:   Blood pressure (!) 170/120, pulse 83, temperature (!) 97 °F (36 1 °C), temperature source Tympanic, resp  rate 18, height 5' 1" (1 549 m), weight 86 6 kg (191 lb), SpO2 99 % , RA, Body mass index is 36 09 kg/m²  Neck Circumference: 16    Physical Exam  General: Pleasant, Awake alert and oriented x 3, conversant without conversational dyspnea, NAD, normal affect  HEENT:  PERRL, Sclera noninjected, nonicteric OU, Nares patent,  no craniofacial abnormalities, Mucous membranes, moist, no oral lesions, normal dentition, Mallampati class 4  NECK: Trachea midline, no accessory muscle use, no stridor, no cervical or supraclavicular adenopathy, JVP not elevated  CARDIAC: Reg, single s1/S2, no m/r/g  PULM: CTA bilaterally no wheezing, rhonchi or rales  ABD: Normoactive bowel sounds, soft nontender, nondistended, no rebound, no rigidity, no guarding  EXT: No cyanosis, no clubbing, no edema, normal capillary refill  NEURO: no focal neurologic deficits, AAOx3, moving all extremities appropriately    Labs: I have personally reviewed pertinent lab results    Lab Results   Component Value Date    WBC 6 74 01/16/2023    HGB 12 6 01/16/2023    HCT 39 4 01/16/2023    MCV 95 01/16/2023     01/16/2023      Lab Results   Component Value Date    CALCIUM 8 9 01/16/2023    K 3 6 01/16/2023    CO2 27 01/16/2023     01/16/2023    BUN 14 01/16/2023    CREATININE 0 63 01/16/2023     Lab Results   Component Value Date    IRON 103 01/31/2022    TIBC 293 01/31/2022    FERRITIN 162 01/31/2022     Lab Results   Component Value Date    SFLYEHMW22 324 01/31/2022     Lab Results   Component Value Date    FOLATE 13 0 07/21/2021       Sleep studies:  None    DO [facility] Sleep Physician

## 2023-02-03 NOTE — PROGRESS NOTES
Sleep Consultation   Esha Read 62 y o  female MRN: [id number]      Reason for consultation: NOAH    Requesting physician: Dr Whitney Jones    Assessment/Plan  62 y o  F with PMHx of s/p gastric bypass, Fibromyalgia, Sacroilitis, Piriformis, syndrome, osteopenia, HTN, B12 deficiency, Vitamin D deficiency, anxiety, chronic pain and NOAH who comes in for NOAH management  1   NOAH noncompliant with CPAP - originally diagnosed many years ago prior to bariatric surgery  She has since gained weight again      -  Check a home study to assess for obstructive sleep apnea      -  I discussed in depth the diagnostic studies and treatment options involved with obstructive sleep apnea      -  I also discussed in depth the risk of leaving sleep apnea untreated including hypertension, heart failure, arrhythmia, MI and stroke  -  The patient is agreeable to undergo testing and treatment of obstructive sleep apnea  She understands that pitfalls she may encounter along the way and is willing to attempt CPAP treatment  2   Insufficient sleep/Profound sleep deprivation - she is only sleeping 4 hrs at night  She only sleeping 4 hrs a night  [place] 20  This is likely due to insufficient sleep  At next visit we will discuss her sleep schedule further and have her complete sleep diary  3   Fibromyalgia/chronic back pain - she does use baclofen, Tylenol 3 to help with the pain  This may potentially add to central sleep apnea as well  This too may impact her sleep schedule  History of Present Illness   HPI:  Esha Read is a 62 y o  female with PMHx as below who comes in for management of insomnia and NOAH  Patient notes weight gain and symptoms of difficulty falling asleep, difficulty staying asleep, snoring, excessive daytime sleepiness with an [place] score of 20, awakenings with gasping, witnessed apneas, morning headaches, awakenings with dry mouth  she note symptoms of restless legs    she denies symptoms of cataplexy, sleep paralysis, hypnopompic or hypnagogic hallucinations  Sleep History:  she goes to bed at approximately 9 pm, will get to sleep in a few minutes, will get out of bed at 2 am   she will get sleep straight through those hours but then will not be able to get back to sleep  She does not typically take naps  ROS:   Review of Systems   Constitutional: Positive for fatigue  Negative for appetite change  HENT: Negative  Eyes: Negative  Respiratory: Negative  Cardiovascular: Negative  Gastrointestinal: Negative  Endocrine: Negative  Genitourinary: Negative  Musculoskeletal: Negative  Skin: Negative  Allergic/Immunologic: Negative  Neurological: Negative  Hematological: Negative  Psychiatric/Behavioral: Negative  Historical Information   Past Medical History:   Diagnosis Date   • Anemia     hx of iron   • Anxiety    • Cataract    • Chronic pain disorder     back radiates down legs   • Colon polyp    • COVID 2020   • CPAP (continuous positive airway pressure) dependence    • Fibromyalgia    • Fibromyalgia, primary    • Hypertension    • Lumbar radiculopathy    • Medical history unknown    • Osteopenia    • Partial tear of right Achilles tendon    • PONV (postoperative nausea and vomiting)    • Sleep apnea    • Sleep apnea, obstructive    • Spinal stenosis    • Staph infection     fingers years ago   • Use of cane as ambulatory aid      Past Surgical History:   Procedure Laterality Date   • ACHILLES TENDON SURGERY Right 2022    Procedure: ACHILLES TENDON PARITAL TEAR REPAIR;  Surgeon: Rey Nguyen DPM;  Location: [facility] MAIN OR;  Service: Podiatry   • BUNIONECTOMY Right 2022    Procedure: Jerald Quinteros;  Surgeon: Rey Nguyen DPM;  Location:  MAIN OR;  Service: Podiatry   • CARPAL TUNNEL RELEASE Bilateral     21 y o  with left ulnar nerve release       • CARPAL TUNNEL RELEASE Bilateral    •  SECTION     • COLONOSCOPY • ELBOW SURGERY      Tennis elbow   • GASTRIC BYPASS     • [place] MILAN IMPLTJ NSTIM ELTRDS PLATE/PADDLE EDRL Left 08/15/2022    Procedure: Thoracic laminectomies for placement of spinal cord stimulator lead and internal pulse generator, left-sided pulse generator, with neuro monitoring;  Surgeon: Rob Camarena MD;  Location:  MAIN OR;  Service: Neurosurgery   • REDUCTION MAMMAPLASTY Bilateral    • SPINAL CORD STIMULATOR IMPLANT  2021   • WISDOM TOOTH EXTRACTION       Family History   Problem Relation Age of Onset   • Ovarian cancer Mother    • Heart disease Mother    • Asthma Mother    • COPD Mother    • Heart failure Mother    • Colon cancer Father    • Diabetes Father    • Diabetes Maternal Grandmother         Aunt   • No Known Problems Maternal Grandfather    • Diabetes Paternal Grandmother    • No Known Problems Paternal Grandfather    • No Known Problems Son    • Leukemia Maternal Uncle    • Diabetes Paternal Aunt    • Diabetes Paternal Uncle      Social History     Socioeconomic History   • Marital status:      Spouse name: Not on file   • Number of children: Not on file   • Years of education: Not on file   • Highest education level: Not on file   Occupational History   • Not on file   Tobacco Use   • Smoking status: Never   • Smokeless tobacco: Never   Vaping Use   • Vaping Use: Never used   Substance and Sexual Activity   • Alcohol use:  Yes     Alcohol/week: 1 0 standard drink     Types: 1 Glasses of wine per week     Comment: Maybe one a month if that   • Drug use: Never   • Sexual activity: Not Currently     Partners: Male     Birth control/protection: Abstinence, None   Other Topics Concern   • Not on file   Social History Narrative   • Not on file     Social Determinants of Health     Financial Resource Strain: Low Risk    • Difficulty of Paying Living Expenses: Not hard at all   Food Insecurity: Not on file   Transportation Needs: No Transportation Needs   • Lack of Transportation (Medical): No   • Lack of Transportation (Non-Medical): No   Physical Activity: Not on file   Stress: Not on file   Social Connections: Not on file   Intimate Partner Violence: Not on file   Housing Stability: Not on file       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 Authorizing Provider   acetaminophen-codeine (TYLENOL #3) 300-30 mg per tablet Take 1 tablet by mouth every 6 (six) hours as needed for moderate pain 1/31/23  Yes Yash Hernandez MD   baclofen 20 mg tablet Take 1 tablet (20 mg total) by mouth 2 (two) times a day as needed for muscle spasms 1/10/23  Yes Yash Hernandez MD   [facility] Vitamin C 500 MG tablet TAKE 1 TABLET (500 MG TOTAL) BY MOUTH DAILY  10/18/22  Yes Yash Hernandez MD   doxepin (SINEquan) 150 MG capsule TAKE 1 CAPSULE (150 MG TOTAL) BY MOUTH DAILY AT BEDTIME 1/16/23  Yes Yash Hernandez MD   ergocalciferol (VITAMIN D2) 50,000 units TAKE 1 CAPSULE (50,000 UNITS TOTAL) BY MOUTH EVERY 28 DAYS 10/17/22  Yes Yash Hernandez MD   Magnesium 250 MG TABS  1/17/23  Yes Historical Provider, MD   valsartan (DIOVAN) 320 MG tablet Take 1 tablet (320 mg total) by mouth daily 1/10/23  Yes Yash Hernandez MD   vitamin B-12 (VITAMIN B-12) 500 mcg tablet Take 1 tablet (500 mcg total) by mouth daily 6/28/22  Yes Yash Hernandez MD   gabapentin (NEURONTIN) 300 mg capsule Take 1 capsule (300 mg total) by mouth 3 (three) times a day In addition to 600 mg TID for a total of 900 mg TID  Patient not taking: Reported on 2/2/2023 6/28/22   Yash Hernandez MD   gabapentin (Neurontin) 600 MG tablet Take 1 tablet (600 mg total) by mouth 3 (three) times a day  Patient not taking: Reported on 2/2/2023 6/28/22   Yash Hernandez MD       Vitals:   Blood pressure (!) 170/120, pulse 83, temperature (!) 97 °F (36 1 °C), temperature source Tympanic, resp   rate 18, height 5' 1" (1 549 m), weight 86 6 kg (191 lb), SpO2 99 % , RA, Body mass index is 36 09 kg/m²  Neck Circumference: 16    Physical Exam  General: Pleasant, Awake alert and oriented x 3, conversant without conversational dyspnea, NAD, normal affect  HEENT:  PERRL, Sclera noninjected, nonicteric OU, Nares patent,  no craniofacial abnormalities, Mucous membranes, moist, no oral lesions, normal dentition, Mallampati class 4  NECK: Trachea midline, no accessory muscle use, no stridor, no cervical or supraclavicular adenopathy, JVP not elevated  CARDIAC: Reg, single s1/S2, no m/r/g  PULM: CTA bilaterally no wheezing, rhonchi or rales  ABD: Normoactive bowel sounds, soft nontender, nondistended, no rebound, no rigidity, no guarding  EXT: No cyanosis, no clubbing, no edema, normal capillary refill  NEURO: no focal neurologic deficits, AAOx3, moving all extremities appropriately    Labs: I have personally reviewed pertinent lab results    Lab Results   Component Value Date    WBC 6 74 01/16/2023    HGB 12 6 01/16/2023    HCT 39 4 01/16/2023    MCV 95 01/16/2023     01/16/2023      Lab Results   Component Value Date    CALCIUM 8 9 01/16/2023    K 3 6 01/16/2023    CO2 27 01/16/2023     01/16/2023    BUN 14 01/16/2023    CREATININE 0 63 01/16/2023     Lab Results   Component Value Date    IRON 103 01/31/2022    TIBC 293 01/31/2022    FERRITIN 162 01/31/2022     Lab Results   Component Value Date    [id number] 139 01/31/2022     Lab Results   Component Value Date    FOLATE 13 0 07/21/2021       Sleep studies:  None    DO Lane [facility]agan Sleep Physician

## 2023-02-08 ENCOUNTER — TELEPHONE (OUTPATIENT)
Dept: HEMATOLOGY ONCOLOGY | Facility: CLINIC | Age: 58
End: 2023-02-08

## 2023-02-08 DIAGNOSIS — E53.8 B12 DEFICIENCY: Primary | ICD-10-CM

## 2023-02-08 DIAGNOSIS — E61.1 IRON DEFICIENCY: ICD-10-CM

## 2023-02-08 NOTE — TELEPHONE ENCOUNTER
Spoke with patient reminding to have labs completed prior to appt  Patient stated she had labs completed recently wanted to know if those test are okay for appt  I advised patient URIEL DE OLIVEIRA ordered other test for her to have completed  Patient voiced understanding

## 2023-02-10 ENCOUNTER — OFFICE VISIT (OUTPATIENT)
Dept: HEMATOLOGY ONCOLOGY | Facility: CLINIC | Age: 58
End: 2023-02-10

## 2023-02-10 VITALS
RESPIRATION RATE: 16 BRPM | HEART RATE: 91 BPM | OXYGEN SATURATION: 99 % | DIASTOLIC BLOOD PRESSURE: 90 MMHG | BODY MASS INDEX: 36.06 KG/M2 | TEMPERATURE: 98 F | HEIGHT: 61 IN | SYSTOLIC BLOOD PRESSURE: 130 MMHG | WEIGHT: 191 LBS

## 2023-02-10 DIAGNOSIS — K91.2 POSTSURGICAL MALABSORPTION: Primary | ICD-10-CM

## 2023-02-10 DIAGNOSIS — E53.8 B12 DEFICIENCY: ICD-10-CM

## 2023-02-10 NOTE — PROGRESS NOTES
Hematology/Oncology Outpatient Follow- up Note  Cassidy Pablo 62 y o  female MRN: @ Encounter: [id number]        Date:  2/10/2023      Assessment / Plan:    Iron deficiency anemia and B12 deficiency secondary to decreased absorption   She is status post gastric bypass surgery  She has received IV iron in the past with good tolerance        Venofer 300 milligrams x 6 doses along with B12 1000 micro grams IM x6 doses administered 3/2021        She remains iron replete        B12 decreased to 400 range  She is taking sublingual B12  B12 1000 micro grams IM weekly x4 2/2022  Hemoglobin remains normal   Iron and B12 are replete  At this time, follow-up as needed        HPI:  Hiral Jaeger is a 57 y  o  seen for initial consultation 2/22/2021 at the referral of Cabrera Babb MD regarding iron deficiency        1/18/2021 hemoglobin 12 7, MCV of 96, white blood cell count 5 48,  62% neutrophils, 25% lymphocytes, 9% monocytes, platelets 053   CMP normal      Vitamin B12 316, ferritin of 10    Normal hepatitis C antibody and HIV antigens/ antibody     5/13/20:   Hemoglobin 13 6, MCV 91 6, white blood cell count 5 2, normal differential, platelets 027     She is status post gastric bypass surgery approximately 2006       She underwent upper endoscopy and colonoscopy 12/31/2019 by Dr Sherley Cheung anastomosis was noted to look good   Internal hemorrhoids noted in 1 small polyp was removed   Repeat colonoscopy in 5 years was recommended,     She has received IV iron in the past at [email or web address] good tolerance     Cannot recall when her last treatment was but it was many years ago      States she does not like going to doctors     She does have chronic back pain      Spends her free time wood working and being creative     She has been postmenopausal for many years  Subha Walker had always been erratic    She has fatigue   Denies any headaches, dizziness, chest pain or shortness of breath   No palpitations   No GI or  issues   She denies any melena, hematochezia or other sources of blood loss      She does take B12 orally faithfully     Venofer 300 milligrams x 6 doses along with B12 1000 micro grams IM x6 doses administered 3/2021        Ferritin improved from 10 1/2021 to 245 5/2021        Iron panel from 8/23/2021 identified saturation 25%, ferritin 170        1/31/22:  Hemoglobin 13 3, white blood cell count 5 92, platelets 289  Iron saturation 35%, ferritin 162  B12 475    Interval History:    1/16/23 hemoglobin 12 6, MCV 95, white blood cell count 6 74, normal differential, platelets 917    4/6/15 iron saturation 29%; ferritin 98 and B12 626      Test Results:        Labs:   Lab Results   Component Value Date    HGB 12 6 01/16/2023    HCT 39 4 01/16/2023    MCV 95 01/16/2023     01/16/2023    WBC 6 74 01/16/2023    NRBC 0 01/16/2023     Lab Results   Component Value Date    K 3 6 01/16/2023     01/16/2023    CO2 27 01/16/2023    BUN 14 01/16/2023    CREATININE 0 63 01/16/2023    GLUF 91 01/16/2023    CALCIUM 8 9 01/16/2023    AST 22 01/16/2023    ALT 30 01/16/2023    ALKPHOS 73 01/16/2023    EGFR 99 01/16/2023           Imaging: Mammo screening bilateral w 3d & cad    Result Date: 1/24/2023  Narrative: DIAGNOSIS: Encounter for screening mammogram for breast cancer; Encounter for screening mammogram for malignant neoplasm of breast TECHNIQUE: Digital screening mammography was performed  Computer Aided Detection (CAD) analyzed all applicable images  COMPARISONS: Prior breast imaging dated: 09/24/2019 RELEVANT HISTORY: Family Breast Cancer History: No known family history of breast cancer  Family Medical History: Family medical history includes colon cancer in father and ovarian cancer in mother  Personal History: No known relevant hormone history  Surgical history includes breast reduction  No known relevant medical history   The patient is scheduled in a reminder system for screening mammography  8-10% of cancers will be missed on mammography  Management of a palpable abnormality must be based on clinical grounds  Patients will be notified of their results via letter from our facility  Accredited by [employer] of [employer] and FDA  RISK ASSESSMENT: 5 Year Tyrer-Cuzick: 0 71 % 10 Year Tyrer-Cuzick: 1 56 % Lifetime Tyrer-Cuzick: 4 47 % TISSUE DENSITY: The breasts are almost entirely fatty  INDICATION: Cassidy Pablo is a 62 y o  female presenting for screening mammography  FINDINGS: Bilateral There are no suspicious masses, grouped microcalcifications or areas of unexplained architectural distortion  The skin and nipple areolar complex are unremarkable  Post reduction changes are noted  Benign-appearing calcifications are noted in each breast      Impression: No mammographic evidence of malignancy  ASSESSMENT/BI-RADS CATEGORY: Left: 2 - Benign Right: 2 - Benign Overall: 2 - Benign RECOMMENDATION:      - Routine screening mammogram in 1 year for both breasts  Workstation ID: [id number]             Allergies: No Known Allergies  Current Medications: Reviewed  PMH/FH/SH:  Reviewed      Physical Exam:    1 85 meters squared    Ht Readings from Last 3 Encounters:   02/10/23 5' 1" (1 549 m)   02/02/23 5' 1" (1 549 m)   01/18/23 5' 1" (1 549 m)        Wt Readings from Last 3 Encounters:   02/10/23 86 6 kg (191 lb)   02/02/23 86 6 kg (191 lb)   01/18/23 87 5 kg (193 lb)        Temp Readings from Last 3 Encounters:   02/10/23 98 °F (36 7 °C)   02/02/23 (!) 97 °F (36 1 °C) (Tympanic)   11/14/22 (!) 97 3 °F (36 3 °C) (Temporal)        BP Readings from Last 3 Encounters:   02/10/23 130/90   02/02/23 (!) 170/120   01/10/23 138/98             Physical Exam  Constitutional:       Appearance: She is well-developed  HENT:      Head: Normocephalic and atraumatic  Cardiovascular:      Rate and Rhythm: Normal rate  Pulmonary:      Effort: Pulmonary effort is normal  No respiratory distress  Skin:     General: Skin is warm and dry  Neurological:      General: No focal deficit present  Mental Status: She is alert and oriented to person, place, and time     Psychiatric:         Behavior: Behavior normal          Emergency Contacts:    Extended Emergency Contact Information  Primary Emergency Contact: [facility]  Mobile Phone: [phone]  Relation: Friend

## 2023-02-13 DIAGNOSIS — F33.1 MODERATE EPISODE OF RECURRENT MAJOR DEPRESSIVE DISORDER (HCC): ICD-10-CM

## 2023-02-14 RX ORDER — DOXEPIN HYDROCHLORIDE 150 MG/1
150 CAPSULE ORAL
Qty: 90 CAPSULE | Refills: 2 | Status: SHIPPED | OUTPATIENT
Start: 2023-02-14

## 2023-06-01 ENCOUNTER — OFFICE VISIT (OUTPATIENT)
Dept: PAIN MEDICINE | Facility: CLINIC | Age: 58
End: 2023-06-01

## 2023-06-01 VITALS
HEART RATE: 83 BPM | WEIGHT: 191 LBS | SYSTOLIC BLOOD PRESSURE: 133 MMHG | DIASTOLIC BLOOD PRESSURE: 89 MMHG | BODY MASS INDEX: 36.09 KG/M2

## 2023-06-01 DIAGNOSIS — M70.71 ISCHIAL BURSITIS OF RIGHT SIDE: Primary | ICD-10-CM

## 2023-06-01 NOTE — PROGRESS NOTES
Assessment:  1  Ischial bursitis of right side        Plan:  Ms Perri Willis is a pleasant 59-year-old female who presents for follow-up and reevaluation  We previously performed a Nevro spinal cord stimulator which has provided significant improvements in her quality of life and pain in her back, however, she did recently undergo foot surgery and reports worsening right buttock pain  During today's evaluation she is demonstrating isolated right buttock pain with difficulty sitting and suspected ischial bursitis  At this time we will plan for a right ischial bursa steroid injection under fluoroscopy guidance  All questions answered, patient is agreeable plan  Complete risks and benefits including bleeding, infection, tissue reaction, nerve injury and allergic reaction were discussed  The approach was demonstrated using models and literature was provided  Verbal and written consent was obtained  My impressions and treatment recommendations were discussed in detail with the patient who verbalized understanding and had no further questions  Discharge instructions were provided  I personally saw and examined the patient and I agree with the above discussed plan of care  Orders Placed This Encounter   Procedures   • FL spine and pain procedure     Standing Status:   Future     Standing Expiration Date:   6/1/2027     Order Specific Question:   Reason for Exam:     Answer:   Right ischial bursa inj     Order Specific Question:   Is the patient pregnant? Answer:   No     Order Specific Question:   Anticoagulant hold needed? Answer:   No     No orders of the defined types were placed in this encounter  History of Present Illness:  Yara Sandoval is a 62 y o  female who presents for a follow up office visit in regards to Buttocks Pain (Right side)     The patient’s current symptoms include right-sided low back, buttock and right foot pain currently rated 7 out of 10 and described as a constant numbness, pressure-like, throbbing sensation  Presents today for follow-up and reevaluation  I have personally reviewed and/or updated the patient's past medical history, past surgical history, family history, social history, current medications, allergies, and vital signs today  Review of Systems   Respiratory: Negative for shortness of breath  Cardiovascular: Negative for chest pain  Gastrointestinal: Negative for constipation, diarrhea, nausea and vomiting  Musculoskeletal: Positive for gait problem and joint swelling  Negative for arthralgias and myalgias  Skin: Negative for rash  Neurological: Negative for dizziness, seizures and weakness  All other systems reviewed and are negative        Patient Active Problem List   Diagnosis   • Colon polyp   • Internal hemorrhoids   • Family history of colorectal cancer   • Vitamin D deficiency   • Iron deficiency   • H/O gastric bypass   • B12 deficiency   • Moderate episode of recurrent major depressive disorder (HCC)   • Fibromyalgia   • Chronic low back pain with left-sided sciatica   • Bilateral hip pain   • Obesity, Class II, BMI 35-39 9   • Encounter for surgical aftercare following surgery of digestive system   • Postsurgical malabsorption   • Constipation   • Bariatric surgery status   • Sacroiliitis ([address] )   • Piriformis syndrome of left side   • Piriformis syndrome of right side   • Trochanteric bursitis of both hips   • Lumbar facet arthropathy   • Osteopenia   • Anxiety   • Lumbar foraminal stenosis   • Spinal stenosis of lumbar region with neurogenic claudication   • Chronic pain syndrome   • Spasm of right piriformis muscle   • Insomnia   • Primary hypertension   • On tricyclic antidepressant therapy   • CPAP (continuous positive airway pressure) dependence   • Sleep apnea   • Class 2 severe obesity due to excess calories with serious comorbidity and body mass index (BMI) of 36 0 to 36 9 in adult [facility])       Past Medical History:   Diagnosis Date • Anemia     hx of iron   • Anxiety    • Cataract    • Chronic pain disorder     back radiates down legs   • Colon polyp    • COVID 2020   • CPAP (continuous positive airway pressure) dependence    • Fibromyalgia    • Fibromyalgia, primary    • Hypertension    • Lumbar radiculopathy    • Medical history unknown    • Osteopenia    • Partial tear of right Achilles tendon    • PONV (postoperative nausea and vomiting)    • Sleep apnea    • Sleep apnea, obstructive    • Spinal stenosis    • Staph infection     fingers years ago   • Use of cane as ambulatory aid        Past Surgical History:   Procedure Laterality Date   • ACHILLES TENDON SURGERY Right 2022    Procedure: ACHILLES TENDON PARITAL TEAR REPAIR;  Surgeon: Velvet Augustin DPM;  Location: [address] [place] [address] MAIN OR;  Service: Podiatry   • BUNIONECTOMY Right 2022    Procedure: Violet Olson;  Surgeon: Velvet Augustin DPM;  Location:  MAIN OR;  Service: Podiatry   • CARPAL TUNNEL RELEASE Bilateral     21 y o  with left ulnar nerve release       • CARPAL TUNNEL RELEASE Bilateral    •  SECTION     • COLONOSCOPY     • ELBOW SURGERY      Tennis elbow   • GASTRIC BYPASS     • MD MILAN IMPLTJ NSTIM ELTRDS PLATE/PADDLE EDRL Left 08/15/2022    Procedure: Thoracic laminectomies for placement of spinal cord stimulator lead and internal pulse generator, left-sided pulse generator, with neuro monitoring;  Surgeon: Amari Rosas MD;  Location:  MAIN OR;  Service: Neurosurgery   • REDUCTION MAMMAPLASTY Bilateral    • SPINAL CORD STIMULATOR IMPLANT     • WISDOM TOOTH EXTRACTION         Family History   Problem Relation Age of Onset   • Ovarian cancer Mother    • Heart disease Mother    • Asthma Mother    • COPD Mother    • Heart failure Mother    • Colon cancer Father    • Diabetes Father    • Diabetes Maternal Grandmother         Aunt   • No Known Problems Maternal Grandfather    • Diabetes Paternal Grandmother    • No Known Problems Paternal Grandfather • No Known Problems Son    • Leukemia Maternal Uncle    • Diabetes Paternal Aunt    • Diabetes Paternal Uncle        Social History     Occupational History   • Not on file   Tobacco Use   • Smoking status: Never   • Smokeless tobacco: Never   Vaping Use   • Vaping Use: Never used   Substance and Sexual Activity   • Alcohol use: Yes     Alcohol/week: 1 0 standard drink of alcohol     Types: 1 Glasses of wine per week     Comment: Maybe one a month if that   • Drug use: Never   • Sexual activity: Not Currently     Partners: Male     Birth control/protection: Abstinence, None       Current Outpatient Medications on File Prior to Visit   Medication Sig   • baclofen 20 mg tablet Take 1 tablet (20 mg total) by mouth 2 (two) times a day as needed for muscle spasms   • [facility] Vitamin C 500 MG tablet TAKE 1 TABLET (500 MG TOTAL) BY MOUTH DAILY  • diclofenac sodium (VOLTAREN) 50 mg EC tablet diclofenac sodium 50 mg tablet,delayed release   TAKE 1 TABLET BY MOUTH TWICE A DAY   • doxepin (SINEquan) 150 MG capsule TAKE 1 CAPSULE BY MOUTH AT BEDTIME   • ergocalciferol (VITAMIN D2) 50,000 units TAKE 1 CAPSULE (50,000 UNITS TOTAL) BY MOUTH EVERY 28 DAYS   • escitalopram (LEXAPRO) 10 mg tablet Take 1 tablet (10 mg total) by mouth daily   • Magnesium 250 MG TABS    • vitamin B-12 (VITAMIN B-12) 500 mcg tablet Take 1 tablet (500 mcg total) by mouth daily     No current facility-administered medications on file prior to visit  No Known Allergies    Physical Exam:    /89   Pulse 83   Wt 86 6 kg (191 lb)   BMI 36 09 kg/m²     Constitutional:normal, well developed, well nourished, alert, in no distress and non-toxic and no overt pain behavior    Eyes:anicteric  HEENT:grossly intact  Neck:supple, symmetric, trachea midline and no masses   Pulmonary:even and unlabored  Cardiovascular:No edema or pitting edema present  Skin:Normal without rashes or lesions and well hydrated  Psychiatric:Mood and affect appropriate  Neurologic:Cranial Nerves II-XII grossly intact  Musculoskeletal: Antalgic, tenderness to palpation right ischial bursa, MMT 5 out of 5 bilateral lower extremities, sensation grossly tact light touch    Imaging

## 2023-06-12 ENCOUNTER — HOSPITAL ENCOUNTER (OUTPATIENT)
Dept: RADIOLOGY | Facility: CLINIC | Age: 58
Discharge: HOME | End: 2023-06-12
Payer: COMMERCIAL

## 2023-06-12 VITALS
OXYGEN SATURATION: 95 % | DIASTOLIC BLOOD PRESSURE: 92 MMHG | TEMPERATURE: 98.5 F | HEART RATE: 67 BPM | SYSTOLIC BLOOD PRESSURE: 138 MMHG | RESPIRATION RATE: 18 BRPM

## 2023-06-12 DIAGNOSIS — M70.71 ISCHIAL BURSITIS OF RIGHT SIDE: ICD-10-CM

## 2023-06-12 PROCEDURE — 77002 NEEDLE LOCALIZATION BY XRAY: CPT

## 2023-06-12 PROCEDURE — 77002 NEEDLE LOCALIZATION BY XRAY: CPT | Performed by: PHYSICAL MEDICINE & REHABILITATION

## 2023-06-12 PROCEDURE — 20610 DRAIN/INJ JOINT/BURSA W/O US: CPT | Performed by: PHYSICAL MEDICINE & REHABILITATION

## 2023-06-12 RX ORDER — 0.9 % SODIUM CHLORIDE 0.9 %
10 VIAL (ML) INJECTION ONCE
Status: COMPLETED | OUTPATIENT
Start: 2023-06-12 | End: 2023-06-12

## 2023-06-12 RX ORDER — METHYLPREDNISOLONE ACETATE 80 MG/ML
80 INJECTION, SUSPENSION INTRA-ARTICULAR; INTRALESIONAL; INTRAMUSCULAR; PARENTERAL; SOFT TISSUE ONCE
Status: COMPLETED | OUTPATIENT
Start: 2023-06-12 | End: 2023-06-12

## 2023-06-12 RX ORDER — BUPIVACAINE HCL/PF 2.5 MG/ML
3 VIAL (ML) INJECTION ONCE
Status: COMPLETED | OUTPATIENT
Start: 2023-06-12 | End: 2023-06-12

## 2023-06-12 RX ADMIN — SODIUM CHLORIDE 2 ML: 9 INJECTION, SOLUTION INTRAMUSCULAR; INTRAVENOUS; SUBCUTANEOUS at 14:33

## 2023-06-12 RX ADMIN — BUPIVACAINE HYDROCHLORIDE 3 ML: 2.5 INJECTION, SOLUTION EPIDURAL; INFILTRATION; INTRACAUDAL at 14:35

## 2023-06-12 RX ADMIN — Medication 2 ML: at 14:33

## 2023-06-12 RX ADMIN — METHYLPREDNISOLONE ACETATE 80 MG: 80 INJECTION, SUSPENSION INTRA-ARTICULAR; INTRALESIONAL; INTRAMUSCULAR; PARENTERAL; SOFT TISSUE at 14:35

## 2023-06-12 RX ADMIN — IOHEXOL 1 ML: 300 INJECTION, SOLUTION INTRAVENOUS at 14:35

## 2023-06-12 NOTE — H&P
History of Present Illness: The patient is a 62 y o  female who presents with complaints of right buttock pain    Past Medical History:   Diagnosis Date   • Anemia     hx of iron   • Anxiety    • Cataract    • Chronic pain disorder     back radiates down legs   • Colon polyp    • COVID 2020   • CPAP (continuous positive airway pressure) dependence    • Fibromyalgia    • Fibromyalgia, primary    • Hypertension    • Lumbar radiculopathy    • Medical history unknown    • Osteopenia    • Partial tear of right Achilles tendon    • PONV (postoperative nausea and vomiting)    • Sleep apnea    • Sleep apnea, obstructive    • Spinal stenosis    • Staph infection     fingers years ago   • Use of cane as ambulatory aid        Past Surgical History:   Procedure Laterality Date   • ACHILLES TENDON SURGERY Right 2022    Procedure: ACHILLES TENDON PARITAL TEAR REPAIR;  Surgeon: Dayna Nguyen DPM;  Location: [address] [place] [address] MAIN OR;  Service: Podiatry   • BUNIONECTOMY Right 2022    Procedure: Pepper Loss;  Surgeon: Dayna Nguyen DPM;  Location:  MAIN OR;  Service: Podiatry   • CARPAL TUNNEL RELEASE Bilateral     21 y o  with left ulnar nerve release       • CARPAL TUNNEL RELEASE Bilateral    •  SECTION     • COLONOSCOPY     • ELBOW SURGERY      Tennis elbow   • GASTRIC BYPASS     • [place] MILAN IMPLTJ NSTIM ELTRDS PLATE/PADDLE EDRL Left 08/15/2022    Procedure: Thoracic laminectomies for placement of spinal cord stimulator lead and internal pulse generator, left-sided pulse generator, with neuro monitoring;  Surgeon: Vin Ramirez MD;  Location:  MAIN OR;  Service: Neurosurgery   • REDUCTION MAMMAPLASTY Bilateral    • SPINAL CORD STIMULATOR IMPLANT     • WISDOM TOOTH EXTRACTION           Current Outpatient Medications:   •  baclofen 20 mg tablet, Take 1 tablet (20 mg total) by mouth 2 (two) times a day as needed for muscle spasms, Disp: 180 tablet, Rfl: 1  •  [facility] Vitamin C 500 MG tablet, TAKE 1 TABLET (500 MG TOTAL) BY MOUTH DAILY  , Disp: 90 tablet, Rfl: 2  •  diclofenac sodium (VOLTAREN) 50 mg EC tablet, diclofenac sodium 50 mg tablet,delayed release  TAKE 1 TABLET BY MOUTH TWICE A DAY, Disp: , Rfl:   •  doxepin (SINEquan) 150 MG capsule, TAKE 1 CAPSULE BY MOUTH AT BEDTIME, Disp: 90 capsule, Rfl: 0  •  ergocalciferol (VITAMIN D2) 50,000 units, TAKE 1 CAPSULE (50,000 UNITS TOTAL) BY MOUTH EVERY 28 DAYS, Disp: 12 capsule, Rfl: 0  •  escitalopram (LEXAPRO) 10 mg tablet, Take 1 tablet (10 mg total) by mouth daily, Disp: 90 tablet, Rfl: 1  •  Magnesium 250 MG TABS, , Disp: , Rfl:   •  vitamin B-12 (VITAMIN B-12) 500 mcg tablet, Take 1 tablet (500 mcg total) by mouth daily, Disp: 90 tablet, Rfl: 1    Current Facility-Administered Medications:   •  bupivacaine (PF) (MARCAINE) 0 25 % injection 3 mL, 3 mL, Intra-articular, Once, Pallavi Fleming DO  •  iohexol (OMNIPAQUE) 300 mg/mL injection 1 mL, 1 mL, Intra-articular, Once, Pallavi Fleming DO  •  lidocaine (PF) (XYLOCAINE-MPF) 2 % injection 2 mL, 2 mL, Infiltration, Once, Pallavi Fleming DO  •  methylPREDNISolone acetate (DEPO-MEDROL) injection 80 mg, 80 mg, Intra-articular, Once, Pallavi Fleming DO  •  sodium chloride (PF) 0 9 % injection 10 mL, 10 mL, Infiltration, Once, Pallavi Fleming DO    No Known Allergies    Physical Exam: There were no vitals filed for this visit  General: Awake, Alert, Oriented x 3, Mood and affect appropriate  Respiratory: Respirations even and unlabored  Cardiovascular: Peripheral pulses intact; no edema  Musculoskeletal Exam: Tenderness palpation right glutes  ASA Score: 2    Patient/Chart Verification  Patient ID Verified: Verbal  ID Band Applied: No  Consents Confirmed: Procedural, To be obtained in the Pre-Procedure area  H&P( within 30 days) Verified: To be obtained in the Pre-Procedure area  Interval H&P(within 24 hr) Complete (required for Outpatients and Surgery Admit only):  To be obtained in the Pre-Procedure area  Allergies Reviewed: Yes  Anticoag/NSAID held?: NA  Currently on antibiotics?: No    Assessment:   1   Ischial bursitis of right side        Plan: Right ischial bursa inj

## 2023-06-12 NOTE — DISCHARGE INSTR - LAB
Do not apply heat to any area that is numb  If you have discomfort or soreness at the injection site, you may apply ice today, 20 minutes on and 20 minutes off  Tomorrow you may use ice or warm, moist heat  Do not apply ice or heat directly to the skin  If you experience severe shortness of breath, go to the Emergency Room  You may have numbness for several hours from the local anesthetic  Please use caution and common sense, especially with weight-bearing activities  You may have an increase or change in the discomfort for 36-48 hours after your treatment  Apply ice and continue with any pain medicine you have been prescribed  Do not do anything strenuous today  You may shower, but no tub baths or hot tubs today  You may resume your normal activities tomorrow, but do not “overdo it”  Resume normal activities slowly when you are feeling better  If you experience redness, drainage or swelling at the injection site, or if you develop a fever above 100 degrees, please call [facility] at [phone] or go to the Emergency Room  Continue to take all routine medicines prescribed by your primary care physician unless otherwise instructed by our staff  Most blood thinners should be started again according to your regularly scheduled dosing  If you have any questions, please give our office a call  As no general anesthesia was used in today's procedure, you should not experience any side effects related to anesthesia  If you have a problem specifically related to your procedure, please call our office at [phone]  Problems not related to your procedure should be directed to your primary care physician

## 2023-06-19 ENCOUNTER — TELEPHONE (OUTPATIENT)
Dept: RADIOLOGY | Facility: MEDICAL CENTER | Age: 58
End: 2023-06-19

## 2023-06-21 DIAGNOSIS — F33.1 MODERATE EPISODE OF RECURRENT MAJOR DEPRESSIVE DISORDER (HCC): ICD-10-CM

## 2023-06-21 RX ORDER — DOXEPIN HYDROCHLORIDE 150 MG/1
CAPSULE ORAL
Qty: 90 CAPSULE | Refills: 0 | Status: SHIPPED | OUTPATIENT
Start: 2023-06-21

## 2023-07-10 DIAGNOSIS — I15.8 OTHER SECONDARY HYPERTENSION: ICD-10-CM

## 2023-07-10 RX ORDER — VALSARTAN 320 MG/1
320 TABLET ORAL DAILY
Qty: 90 TABLET | Refills: 1 | Status: SHIPPED | OUTPATIENT
Start: 2023-07-10

## 2023-10-01 ENCOUNTER — HOSPITAL ENCOUNTER (OUTPATIENT)
Dept: SLEEP CENTER | Facility: CLINIC | Age: 58
Discharge: HOME | End: 2023-10-01
Payer: COMMERCIAL

## 2023-10-01 DIAGNOSIS — G47.33 OSA (OBSTRUCTIVE SLEEP APNEA): ICD-10-CM

## 2023-10-01 PROCEDURE — 95811 POLYSOM 6/>YRS CPAP 4/> PARM: CPT

## 2023-10-01 PROCEDURE — 95811 POLYSOM 6/>YRS CPAP 4/> PARM: CPT | Performed by: INTERNAL MEDICINE

## 2023-10-02 DIAGNOSIS — G47.33 OSA (OBSTRUCTIVE SLEEP APNEA): Primary | ICD-10-CM

## 2023-10-02 NOTE — PROGRESS NOTES
Sleep Study Documentation    Pre-Sleep Study       Sleep testing procedure explained to patient:YES    Patient napped prior to study:NO    Caffeine:Dayshift worker after 12PM.  Caffeine use:NO    Alcohol:Dayshift workers after 5PM: Alcohol use:NO    Typical day for patient:YES       Study Documentation    Sleep Study Indications: EDS    Sleep Study: Split Optimal PAP pressure: 9/5cmH2O  Leak:None  Snore:Eliminated  REM Obtained:yes  Supplemental O2: no    Minimum SaO2 78%  Baseline SaO2 94%  PAP mask tried (list all)AirFit F30   PAP mask choice (final) AirFit P10  PAP mask type:pillows  PAP pressure at which snoring was eliminated 9/5cmH2O  Minimum SaO2 at final PAP pressure 91%  Mode of Therapy:BiPAP  ETCO2:No  CPAP changed to BiPAP:No    Mode of Therapy:BiPAP    EKG abnormalities: no     EEG abnormalities: no    Were abnormal behaviors in sleep observed:NO    Is Total Sleep Study Recording Time < 2 hours: N/A    Is Total Sleep Study Recording Time > 2 hours but study is incomplete: N/A    Is Total Sleep Study Recording Time 6 hours or more but sleep was not obtained: NO    Patient classification: disabled       Post-Sleep Study    Medication used at bedtime or during sleep study:NO    Patient reports time it took to fall asleep:greater than 60 minutes    Patient reports waking up during study:Denied    Patient reports sleeping 4 to 6 hours without dreaming. Does the Patient feel this is a typical night of sleep:better than usual    Patient rated sleepiness: Somewhat sleepy or tired    PAP treatment:yes: Post PAP treatment patient reports feeling unsure if a change is noted and  would wear PAP mask at home.

## 2023-10-03 ENCOUNTER — RA CDI HCC (OUTPATIENT)
Dept: OTHER | Facility: HOSPITAL | Age: 58
End: 2023-10-03

## 2023-10-03 NOTE — PROGRESS NOTES
[facility] coding opportunities       Chart reviewed, no opportunity found: [address]        Patients Insurance     Medicare Insurance: [facility]

## 2023-10-09 ENCOUNTER — TELEPHONE (OUTPATIENT)
Dept: SLEEP CENTER | Facility: CLINIC | Age: 58
End: 2023-10-09

## 2023-10-09 NOTE — TELEPHONE ENCOUNTER
Call placed to patient. Left message split study is resulted and to call the nursing staff to review the study results and the provider's recommendations. First portion shows severe NOAH (AHI-78.6), which was effectively treated with BiPAP during the second portion. BiPAP ordered. Patient needs DME set up and compliance appointments.

## 2023-10-11 LAB

## 2023-10-11 NOTE — TELEPHONE ENCOUNTER
Returned call to patient. Provided split study results. DME set up 10/330/2023 in [employer] office. Compliance follow up 2/12/2024 with Dr. Lincoln Lozano in [employer] office. Added to wait list for sooner appointment to meet compliance (11/30/2023-1/28/2024). Rx for BiPAP and clinicals sent to [email or web address] via [email or web address].

## 2023-10-27 ENCOUNTER — OFFICE VISIT (OUTPATIENT)
Dept: FAMILY MEDICINE CLINIC | Facility: CLINIC | Age: 58
End: 2023-10-27
Payer: MEDICARE

## 2023-10-27 VITALS
HEART RATE: 76 BPM | OXYGEN SATURATION: 99 % | RESPIRATION RATE: 16 BRPM | BODY MASS INDEX: 36.09 KG/M2 | DIASTOLIC BLOOD PRESSURE: 82 MMHG | HEIGHT: 61 IN | SYSTOLIC BLOOD PRESSURE: 138 MMHG

## 2023-10-27 DIAGNOSIS — Z98.84 H/O GASTRIC BYPASS: ICD-10-CM

## 2023-10-27 DIAGNOSIS — Z79.899 OTHER LONG TERM (CURRENT) DRUG THERAPY: ICD-10-CM

## 2023-10-27 DIAGNOSIS — M79.7 FIBROMYALGIA: ICD-10-CM

## 2023-10-27 DIAGNOSIS — Z12.31 ENCOUNTER FOR SCREENING MAMMOGRAM FOR BREAST CANCER: ICD-10-CM

## 2023-10-27 DIAGNOSIS — K59.09 CHRONIC CONSTIPATION: ICD-10-CM

## 2023-10-27 DIAGNOSIS — F41.9 ANXIETY: ICD-10-CM

## 2023-10-27 DIAGNOSIS — E83.42 HYPOMAGNESEMIA: ICD-10-CM

## 2023-10-27 DIAGNOSIS — D36.13: ICD-10-CM

## 2023-10-27 DIAGNOSIS — F33.1 MODERATE EPISODE OF RECURRENT MAJOR DEPRESSIVE DISORDER (HCC): ICD-10-CM

## 2023-10-27 DIAGNOSIS — I15.8 OTHER SECONDARY HYPERTENSION: ICD-10-CM

## 2023-10-27 DIAGNOSIS — E55.9 VITAMIN D DEFICIENCY: ICD-10-CM

## 2023-10-27 DIAGNOSIS — Z23 ENCOUNTER FOR IMMUNIZATION: Primary | ICD-10-CM

## 2023-10-27 DIAGNOSIS — E53.8 B12 DEFICIENCY: ICD-10-CM

## 2023-10-27 DIAGNOSIS — E61.1 IRON DEFICIENCY: ICD-10-CM

## 2023-10-27 PROCEDURE — 99214 OFFICE O/P EST MOD 30 MIN: CPT | Performed by: FAMILY MEDICINE

## 2023-10-27 RX ORDER — ACETAMINOPHEN AND CODEINE PHOSPHATE 300; 30 MG/1; MG/1
TABLET ORAL
COMMUNITY
End: 2023-10-27 | Stop reason: ALTCHOICE

## 2023-10-27 RX ORDER — MULTIVITAMIN WITH IRON
TABLET ORAL
Status: CANCELLED | OUTPATIENT
Start: 2023-10-27

## 2023-10-27 RX ORDER — ERGOCALCIFEROL 1.25 MG/1
50000 CAPSULE ORAL
Qty: 12 CAPSULE | Refills: 0 | Status: CANCELLED | OUTPATIENT
Start: 2023-10-27

## 2023-10-27 RX ORDER — GABAPENTIN 300 MG/1
CAPSULE ORAL
COMMUNITY
End: 2023-10-27 | Stop reason: ALTCHOICE

## 2023-10-27 RX ORDER — GABAPENTIN 600 MG/1
1 TABLET ORAL 3 TIMES DAILY
COMMUNITY
End: 2023-10-27 | Stop reason: ALTCHOICE

## 2023-10-27 RX ORDER — DOXEPIN HYDROCHLORIDE 150 MG/1
150 CAPSULE ORAL
Qty: 90 CAPSULE | Refills: 0 | Status: CANCELLED | OUTPATIENT
Start: 2023-10-27

## 2023-10-27 RX ORDER — VALSARTAN 320 MG/1
320 TABLET ORAL DAILY
Qty: 90 TABLET | Refills: 1 | Status: CANCELLED | OUTPATIENT
Start: 2023-10-27

## 2023-10-27 RX ORDER — ESCITALOPRAM OXALATE 10 MG/1
10 TABLET ORAL DAILY
Qty: 90 TABLET | Refills: 1 | Status: SHIPPED | OUTPATIENT
Start: 2023-10-27

## 2023-10-27 NOTE — PROGRESS NOTES
Assessment/Plan:  1. Encounter for immunization    2. Moderate episode of recurrent major depressive disorder ([address])    3. Vitamin D deficiency  -     Vitamin D 25 hydroxy; Future    4. Anxiety  -     escitalopram (LEXAPRO) 10 mg tablet; Take 1 tablet (10 mg total) by mouth daily    5. Other secondary hypertension  -     CBC and differential; Future  -     Comprehensive metabolic panel; Future    6. Fibromyalgia    7. H/O gastric bypass  -     Mammo screening bilateral w 3d & cad; Future; Expected date: 01/22/2024  -     escitalopram (LEXAPRO) 10 mg tablet; Take 1 tablet (10 mg total) by mouth daily  -     Vitamin B12; Future  -     Vitamin D 25 hydroxy; Future  -     Iron Panel (Includes Ferritin, Iron Sat%, Iron, and TIBC); Future  -     Magnesium; Future  -     CBC and differential; Future  -     Comprehensive metabolic panel; Future  -     HEMOGLOBIN A1C W/ EAG ESTIMATION; Future    8. Other long term (current) drug therapy  -     HEMOGLOBIN A1C W/ EAG ESTIMATION; Future    9. B12 deficiency  -     Vitamin B12; Future    10. Iron deficiency  -     Iron Panel (Includes Ferritin, Iron Sat%, Iron, and TIBC); Future    11. Neuroma of ankle    12. Hypomagnesemia  -     Magnesium; Future    13. Encounter for screening mammogram for breast cancer  -     Mammo screening bilateral w 3d & cad; Future; Expected date: 01/22/2024    14. Chronic constipation  -     linaCLOtide 145 MCG CAPS; Take 1 capsule (145 mcg total) by mouth daily at least 30 minutes prior to the first meal of the day      Health management. I will order a mammogram and blood tests. Constipation. Prescribed Linzess and take it on an empty stomach. Advised to take magnesium daily. Subjective:      Patient ID: Joellen Diamond is a 62 y.o. female here for her 6-month follow-up. She does not experience pain today. Patient has sleep apnea. She has chosen not to use a CPAP machine due to the anxiety it causes her.   She was prescribed a BiPAP device. Patient has an upcoming appointment with her pulmonologist next week. She consumes prunes and raisins every day. Her bowel movement is irregular. She takes Aleve and Tylenol for back pain, which provides effective relief. She takes citalopram for anxiety and finds relief from it. She takes baclofen for muscle spasms. Denies taking blood pressure medication. She did not receive the flu vaccine. The following portions of the patient's history were reviewed and updated as appropriate: allergies, current medications, past family history, past medical history, past social history, past surgical history and problem list.    Review of Systems   Constitutional:  Negative for fever and unexpected weight change. HENT:  Negative for nosebleeds and trouble swallowing. Eyes:  Negative for visual disturbance. Respiratory:  Negative for chest tightness and shortness of breath. Cardiovascular:  Negative for chest pain, palpitations and leg swelling. Gastrointestinal:  Negative for abdominal pain, constipation, diarrhea and nausea. Endocrine: Negative for cold intolerance. Genitourinary:  Negative for dysuria and urgency. Musculoskeletal:  Positive for arthralgias, back pain and gait problem. Negative for joint swelling and myalgias. Skin:  Negative for rash. Neurological:  Negative for tremors, seizures and syncope. Hematological:  Does not bruise/bleed easily. Psychiatric/Behavioral:  Positive for dysphoric mood. Negative for hallucinations and suicidal ideas. Objective:     /82 (BP Location: Right arm, Patient Position: Sitting, Cuff Size: Standard)   Pulse 76   Resp 16   Ht 5' 1" (1.549 m)   SpO2 99%   BMI 36.09 kg/m²    Physical Exam  Vitals and nursing note reviewed. Constitutional:       Appearance: She is well-developed. She is obese. HENT:      Head: Normocephalic and atraumatic.       Right Ear: External ear normal.      Left Ear: External ear normal.      Nose: Nose normal.   Eyes:      Conjunctiva/sclera: Conjunctivae normal.      Pupils: Pupils are equal, round, and reactive to light. Cardiovascular:      Rate and Rhythm: Normal rate and regular rhythm. Heart sounds: Normal heart sounds. No murmur heard. Pulmonary:      Effort: Pulmonary effort is normal.      Breath sounds: Normal breath sounds. No wheezing. Abdominal:      General: Bowel sounds are normal.      Palpations: Abdomen is soft. Musculoskeletal:         General: Tenderness present. Normal range of motion. Cervical back: Normal range of motion and neck supple. Lymphadenopathy:      Cervical: No cervical adenopathy. Skin:     General: Skin is warm and dry. Capillary Refill: Capillary refill takes less than 2 seconds. Neurological:      Mental Status: She is alert and oriented to person, place, and time. Gait: Gait abnormal.   Psychiatric:         Behavior: Behavior normal.         Thought Content: Thought content normal.         Judgment: Judgment normal.           No visits with results within 2 Week(s) from this visit.    Latest known visit with results is:   Appointment on 01/16/2023   Component Date Value   • Hemoglobin A1C 01/16/2023 5.4    • EAG 01/16/2023 108    • Magnesium 01/16/2023 1.8 (L)    • WBC 01/16/2023 6.74    • RBC 01/16/2023 4.16    • Hemoglobin 01/16/2023 12.6    • Hematocrit 01/16/2023 39.4    • MCV 01/16/2023 95    • MCH 01/16/2023 30.3    • MCHC 01/16/2023 32.0    • RDW 01/16/2023 11.7    • MPV 01/16/2023 9.8    • Platelets 65/26/1061 254    • nRBC 01/16/2023 0    • Neutrophils Relative 01/16/2023 69    • Immat GRANS % 01/16/2023 0    • Lymphocytes Relative 01/16/2023 21    • Monocytes Relative 01/16/2023 8    • Eosinophils Relative 01/16/2023 2    • Basophils Relative 01/16/2023 0    • Neutrophils Absolute 01/16/2023 4.68    • Immature Grans Absolute 01/16/2023 0.01    • Lymphocytes Absolute 01/16/2023 1.39    • Monocytes Absolute 01/16/2023 0.52    • Eosinophils Absolute 01/16/2023 0.11    • Basophils Absolute 01/16/2023 0.03    • Sodium 01/16/2023 137    • Potassium 01/16/2023 3.6    • Chloride 01/16/2023 102    • CO2 01/16/2023 27    • ANION GAP 01/16/2023 8    • BUN 01/16/2023 14    • Creatinine 01/16/2023 0.63    • Glucose, Fasting 01/16/2023 91    • Calcium 01/16/2023 8.9    • AST 01/16/2023 22    • ALT 01/16/2023 30    • Alkaline Phosphatase 01/16/2023 73    • Total Protein 01/16/2023 7.2    • Albumin 01/16/2023 4.0    • Total Bilirubin 01/16/2023 0.50    • eGFR 01/16/2023 99      I have personally reviewed results with the patient. BMI Counseling: Body mass index is 36.09 kg/m². The BMI is above normal. Nutrition recommendations include decreasing portion sizes, encouraging healthy choices of fruits and vegetables, decreasing fast food intake, consuming healthier snacks and limiting drinks that contain sugar. Exercise recommendations include exercising 3-5 times per week. No pharmacotherapy was ordered. Rationale for BMI follow-up plan is due to patient being overweight or obese. Timothy Olmedo MD   [facility]     Transcribed for Timothy Olmedo MD, by Grayson Romero on 10/29/23 at 8:17 AM. Powered by [employer].

## 2023-10-30 ENCOUNTER — TELEPHONE (OUTPATIENT)
Dept: SLEEP CENTER | Facility: CLINIC | Age: 58
End: 2023-10-30

## 2023-10-30 NOTE — TELEPHONE ENCOUNTER
I set this pt. up in [facility] on a ResMed S10 AirCurve BIPAP 25/5cm, PS 4cm and gave no mask she will use her P10 from the sleep study.

## 2023-11-01 LAB — HBA1C MFR BLD HPLC: 5.5 %

## 2023-11-02 DIAGNOSIS — E53.8 B12 DEFICIENCY: ICD-10-CM

## 2023-11-02 DIAGNOSIS — E55.9 VITAMIN D DEFICIENCY: ICD-10-CM

## 2023-11-02 DIAGNOSIS — G57.01 PIRIFORMIS SYNDROME OF RIGHT SIDE: ICD-10-CM

## 2023-11-02 RX ORDER — ERGOCALCIFEROL 1.25 MG/1
50000 CAPSULE ORAL
Qty: 12 CAPSULE | Refills: 0 | Status: SHIPPED | OUTPATIENT
Start: 2023-11-02

## 2023-11-02 NOTE — TELEPHONE ENCOUNTER
Requested medication(s) are due for refill today: Yes  Patient has already received a courtesy refill: Yes  Other reason request has been forwarded to provider: Refill must be reviewed and completed by the office or provider.  The refill is unable to be approved by the medication management team.

## 2023-11-03 RX ORDER — CHOLECALCIFEROL (VITAMIN D3) 125 MCG
500 CAPSULE ORAL DAILY
Qty: 90 TABLET | Refills: 0 | Status: SHIPPED | OUTPATIENT
Start: 2023-11-03

## 2023-11-03 RX ORDER — BACLOFEN 20 MG/1
20 TABLET ORAL 2 TIMES DAILY PRN
Qty: 180 TABLET | Refills: 0 | Status: SHIPPED | OUTPATIENT
Start: 2023-11-03

## 2023-11-03 RX ORDER — MULTIVITAMIN WITH IRON
1 TABLET ORAL DAILY
Qty: 90 TABLET | Refills: 1 | Status: SHIPPED | OUTPATIENT
Start: 2023-11-03

## 2024-02-21 DIAGNOSIS — I15.8 OTHER SECONDARY HYPERTENSION: ICD-10-CM

## 2024-02-23 RX ORDER — VALSARTAN 320 MG/1
320 TABLET ORAL DAILY
Qty: 90 TABLET | Refills: 1 | Status: SHIPPED | OUTPATIENT
Start: 2024-02-23 | End: 2024-02-27 | Stop reason: ALTCHOICE

## 2024-02-26 RX ORDER — NEEDLES, SAFETY 22GX1 1/2"
NEEDLE, DISPOSABLE MISCELLANEOUS
COMMUNITY
Start: 2024-01-05

## 2024-02-26 RX ORDER — CYANOCOBALAMIN 1000 UG/ML
INJECTION, SOLUTION INTRAMUSCULAR; SUBCUTANEOUS
COMMUNITY
Start: 2024-02-14

## 2024-02-27 ENCOUNTER — OFFICE VISIT (OUTPATIENT)
Dept: FAMILY MEDICINE CLINIC | Facility: CLINIC | Age: 59
End: 2024-02-27
Payer: COMMERCIAL

## 2024-02-27 VITALS
HEART RATE: 91 BPM | SYSTOLIC BLOOD PRESSURE: 142 MMHG | RESPIRATION RATE: 16 BRPM | BODY MASS INDEX: 36.09 KG/M2 | DIASTOLIC BLOOD PRESSURE: 82 MMHG | HEIGHT: 61 IN | OXYGEN SATURATION: 98 %

## 2024-02-27 DIAGNOSIS — E66.01 CLASS 2 SEVERE OBESITY DUE TO EXCESS CALORIES WITH SERIOUS COMORBIDITY AND BODY MASS INDEX (BMI) OF 36.0 TO 36.9 IN ADULT: ICD-10-CM

## 2024-02-27 DIAGNOSIS — Z78.0 POST-MENOPAUSAL: ICD-10-CM

## 2024-02-27 DIAGNOSIS — E53.8 B12 DEFICIENCY: ICD-10-CM

## 2024-02-27 DIAGNOSIS — M46.1 SACROILIITIS (HCC): ICD-10-CM

## 2024-02-27 DIAGNOSIS — E78.2 MIXED HYPERLIPIDEMIA: ICD-10-CM

## 2024-02-27 DIAGNOSIS — Z00.00 WELL ADULT EXAM: Primary | ICD-10-CM

## 2024-02-27 DIAGNOSIS — N83.209 CYST OF OVARY, UNSPECIFIED LATERALITY: ICD-10-CM

## 2024-02-27 DIAGNOSIS — F33.1 MODERATE EPISODE OF RECURRENT MAJOR DEPRESSIVE DISORDER (HCC): ICD-10-CM

## 2024-02-27 DIAGNOSIS — E55.9 VITAMIN D DEFICIENCY: ICD-10-CM

## 2024-02-27 PROCEDURE — 99214 OFFICE O/P EST MOD 30 MIN: CPT | Performed by: FAMILY MEDICINE

## 2024-02-27 PROCEDURE — G0439 PPPS, SUBSEQ VISIT: HCPCS | Performed by: FAMILY MEDICINE

## 2024-02-27 NOTE — PATIENT INSTRUCTIONS
Medicare Preventive Visit Patient Instructions  Thank you for completing your Welcome to Medicare Visit or Medicare Annual Wellness Visit today. Your next wellness visit will be due in one year (2/27/2025).  The screening/preventive services that you may require over the next 5-10 years are detailed below. Some tests may not apply to you based off risk factors and/or age. Screening tests ordered at today's visit but not completed yet may show as past due. Also, please note that scanned in results may not display below.  Preventive Screenings:  Service Recommendations Previous Testing/Comments   Colorectal Cancer Screening  * Colonoscopy    * Fecal Occult Blood Test (FOBT)/Fecal Immunochemical Test (FIT)  * Fecal DNA/Cologuard Test  * Flexible Sigmoidoscopy Age: 45-75 years old   Colonoscopy: every 10 years (may be performed more frequently if at higher risk)  OR  FOBT/FIT: every 1 year  OR  Cologuard: every 3 years  OR  Sigmoidoscopy: every 5 years  Screening may be recommended earlier than age 45 if at higher risk for colorectal cancer. Also, an individualized decision between you and your healthcare provider will decide whether screening between the ages of 76-85 would be appropriate. Colonoscopy: 12/31/2019  FOBT/FIT: Not on file  Cologuard: Not on file  Sigmoidoscopy: Not on file    Screening Current     Breast Cancer Screening Age: 40+ years old  Frequency: every 1-2 years  Not required if history of left and right mastectomy Mammogram: 01/18/2023    Screening Current   Cervical Cancer Screening Between the ages of 21-29, pap smear recommended once every 3 years.   Between the ages of 30-65, can perform pap smear with HPV co-testing every 5 years.   Recommendations may differ for women with a history of total hysterectomy, cervical cancer, or abnormal pap smears in past. Pap Smear: Not on file        Hepatitis C Screening Once for adults born between 1945 and 1965  More frequently in patients at high risk for  Hepatitis C Hep C Antibody: 01/18/2021    Screening Current   Diabetes Screening 1-2 times per year if you're at risk for diabetes or have pre-diabetes Fasting glucose: 91 mg/dL (1/16/2023)  A1C: 5.5 % (11/1/2023)  Screening Current   Cholesterol Screening Once every 5 years if you don't have a lipid disorder. May order more often based on risk factors. Lipid panel: 01/31/2022    Screening Current     Other Preventive Screenings Covered by Medicare:  Abdominal Aortic Aneurysm (AAA) Screening: covered once if your at risk. You're considered to be at risk if you have a family history of AAA.  Lung Cancer Screening: covers low dose CT scan once per year if you meet all of the following conditions: (1) Age 55-77; (2) No signs or symptoms of lung cancer; (3) Current smoker or have quit smoking within the last 15 years; (4) You have a tobacco smoking history of at least 20 pack years (packs per day multiplied by number of years you smoked); (5) You get a written order from a healthcare provider.  Glaucoma Screening: covered annually if you're considered high risk: (1) You have diabetes OR (2) Family history of glaucoma OR (3)  aged 50 and older OR (4)  American aged 65 and older  Osteoporosis Screening: covered every 2 years if you meet one of the following conditions: (1) You're estrogen deficient and at risk for osteoporosis based off medical history and other findings; (2) Have a vertebral abnormality; (3) On glucocorticoid therapy for more than 3 months; (4) Have primary hyperparathyroidism; (5) On osteoporosis medications and need to assess response to drug therapy.   Last bone density test (DXA Scan): 03/23/2022.  HIV Screening: covered annually if you're between the age of 15-65. Also covered annually if you are younger than 15 and older than 65 with risk factors for HIV infection. For pregnant patients, it is covered up to 3 times per pregnancy.    Immunizations:  Immunization  Recommendations   Influenza Vaccine Annual influenza vaccination during flu season is recommended for all persons aged >= 6 months who do not have contraindications   Pneumococcal Vaccine   * Pneumococcal conjugate vaccine = PCV13 (Prevnar 13), PCV15 (Vaxneuvance), PCV20 (Prevnar 20)  * Pneumococcal polysaccharide vaccine = PPSV23 (Pneumovax) Adults 19-63 yo with certain risk factors or if 65+ yo  If never received any pneumonia vaccine: recommend Prevnar 20 (PCV20)  Give PCV20 if previously received 1 dose of PCV13 or PPSV23   Hepatitis B Vaccine 3 dose series if at intermediate or high risk (ex: diabetes, end stage renal disease, liver disease)   Respiratory syncytial virus (RSV) Vaccine - COVERED BY MEDICARE PART D  * RSVPreF3 (Arexvy) CDC recommends that adults 60 years of age and older may receive a single dose of RSV vaccine using shared clinical decision-making (SCDM)   Tetanus (Td) Vaccine - COST NOT COVERED BY MEDICARE PART B Following completion of primary series, a booster dose should be given every 10 years to maintain immunity against tetanus. Td may also be given as tetanus wound prophylaxis.   Tdap Vaccine - COST NOT COVERED BY MEDICARE PART B Recommended at least once for all adults. For pregnant patients, recommended with each pregnancy.   Shingles Vaccine (Shingrix) - COST NOT COVERED BY MEDICARE PART B  2 shot series recommended in those 19 years and older who have or will have weakened immune systems or those 50 years and older     Health Maintenance Due:      Topic Date Due   • Cervical Cancer Screening  Never done   • Breast Cancer Screening: Mammogram  01/18/2024   • Colorectal Cancer Screening  12/31/2024   • HIV Screening  Completed   • Hepatitis C Screening  Completed     Immunizations Due:      Topic Date Due   • Influenza Vaccine (1) 09/01/2023   • COVID-19 Vaccine (1 - 2023-24 season) Never done     Advance Directives   What are advance directives?  Advance directives are legal documents  that state your wishes and plans for medical care. These plans are made ahead of time in case you lose your ability to make decisions for yourself. Advance directives can apply to any medical decision, such as the treatments you want, and if you want to donate organs.   What are the types of advance directives?  There are many types of advance directives, and each state has rules about how to use them. You may choose a combination of any of the following:  Living will:  This is a written record of the treatment you want. You can also choose which treatments you do not want, which to limit, and which to stop at a certain time. This includes surgery, medicine, IV fluid, and tube feedings.   Durable power of  for healthcare (DPAHC):  This is a written record that states who you want to make healthcare choices for you when you are unable to make them for yourself. This person, called a proxy, is usually a family member or a friend. You may choose more than 1 proxy.  Do not resuscitate (DNR) order:  A DNR order is used in case your heart stops beating or you stop breathing. It is a request not to have certain forms of treatment, such as CPR. A DNR order may be included in other types of advance directives.  Medical directive:  This covers the care that you want if you are in a coma, near death, or unable to make decisions for yourself. You can list the treatments you want for each condition. Treatment may include pain medicine, surgery, blood transfusions, dialysis, IV or tube feedings, and a ventilator (breathing machine).  Values history:  This document has questions about your views, beliefs, and how you feel and think about life. This information can help others choose the care that you would choose.  Why are advance directives important?  An advance directive helps you control your care. Although spoken wishes may be used, it is better to have your wishes written down. Spoken wishes can be misunderstood, or  not followed. Treatments may be given even if you do not want them. An advance directive may make it easier for your family to make difficult choices about your care.   Weight Management   Why it is important to manage your weight:  Being overweight increases your risk of health conditions such as heart disease, high blood pressure, type 2 diabetes, and certain types of cancer. It can also increase your risk for osteoarthritis, sleep apnea, and other respiratory problems. Aim for a slow, steady weight loss. Even a small amount of weight loss can lower your risk of health problems.  How to lose weight safely:  A safe and healthy way to lose weight is to eat fewer calories and get regular exercise. You can lose up about 1 pound a week by decreasing the number of calories you eat by 500 calories each day.   Healthy meal plan for weight management:  A healthy meal plan includes a variety of foods, contains fewer calories, and helps you stay healthy. A healthy meal plan includes the following:  Eat whole-grain foods more often.  A healthy meal plan should contain fiber. Fiber is the part of grains, fruits, and vegetables that is not broken down by your body. Whole-grain foods are healthy and provide extra fiber in your diet. Some examples of whole-grain foods are whole-wheat breads and pastas, oatmeal, brown rice, and bulgur.  Eat a variety of vegetables every day.  Include dark, leafy greens such as spinach, kale, agnieszka greens, and mustard greens. Eat yellow and orange vegetables such as carrots, sweet potatoes, and winter squash.   Eat a variety of fruits every day.  Choose fresh or canned fruit (canned in its own juice or light syrup) instead of juice. Fruit juice has very little or no fiber.  Eat low-fat dairy foods.  Drink fat-free (skim) milk or 1% milk. Eat fat-free yogurt and low-fat cottage cheese. Try low-fat cheeses such as mozzarella and other reduced-fat cheeses.  Choose meat and other protein foods that  are low in fat.  Choose beans or other legumes such as split peas or lentils. Choose fish, skinless poultry (chicken or turkey), or lean cuts of red meat (beef or pork). Before you cook meat or poultry, cut off any visible fat.   Use less fat and oil.  Try baking foods instead of frying them. Add less fat, such as margarine, sour cream, regular salad dressing and mayonnaise to foods. Eat fewer high-fat foods. Some examples of high-fat foods include french fries, doughnuts, ice cream, and cakes.  Eat fewer sweets.  Limit foods and drinks that are high in sugar. This includes candy, cookies, regular soda, and sweetened drinks.  Exercise:  Exercise at least 30 minutes per day on most days of the week. Some examples of exercise include walking, biking, dancing, and swimming. You can also fit in more physical activity by taking the stairs instead of the elevator or parking farther away from stores. Ask your healthcare provider about the best exercise plan for you.      © Copyright [employer] 2018 Information is for End User's use only and may not be sold, redistributed or otherwise used for commercial purposes. All illustrations and images included in CareNotes® are the copyrighted property of [employer] or [employer]

## 2024-02-27 NOTE — PROGRESS NOTES
Assessment and Plan:     Problem List Items Addressed This Visit    None      Depression Screening and Follow-up Plan: Patient's depression screening was positive with a PHQ-9 score of 5. Patient assessed for underlying major depression. Brief counseling provided and recommend additional follow-up/re-evaluation next office visit. Patient with underlying depression and was advised to continue current medications as prescribed.       Assessment & Plan  1. Hypertension.  Her systolic blood pressure is slightly elevated today.    2. Health maintenance.  She will reschedule her mammogram. I will order a pelvic ultrasound. She will follow up with her OB/GYN. I will order blood work to check her vitamin D, B12, and cholesterol. She will receive her shingles vaccine.    3. Weight gain.  Her vitamin D level was low. She was advised to do intermittent fasting. She was encouraged to exercise.    4. Trigger finger.  It is arthritis. She was advised to use Voltaren gel. If it is getting worse, we will refer her to a hand surgeon.    5. Mood.  She will continue Lexapro 10 mg.    Follow-up  The patient will follow up as needed.    1. Well adult exam  -     CBC and differential  -     Comprehensive metabolic panel  -     Lipid Panel with Direct LDL reflex  -     TSH, 3rd generation with Free T4 reflex    2. Class 2 severe obesity due to excess calories with serious comorbidity and body mass index (BMI) of 36.0 to 36.9 in adult     3. Cyst of ovary, unspecified laterality  -     Ambulatory Referral to Obstetrics / Gynecology; Future  -     US pelvis complete non OB; Future; Expected date: 02/27/2024    4. Vitamin D deficiency  -     Vitamin D 25 hydroxy    5. B12 deficiency  -     Vitamin B12    6. Mixed hyperlipidemia  -     Lipid Panel with Direct LDL reflex    7. Sacroiliitis (HCC)    8. Moderate episode of recurrent major depressive disorder (HCC)    9. Post-menopausal  -     DXA bone density spine hip and pelvis; Future;  Expected date: 02/27/2024        Preventive health issues were discussed with patient, and age appropriate screening tests were ordered as noted in patient's After Visit Summary.  Personalized health advice and appropriate referrals for health education or preventive services given if needed, as noted in patient's After Visit Summary.     History of Present Illness:     Patient presents for a Medicare Wellness Visit    HPI     History of Present Illness  The patient is a 58-year-old female who is here for Medicare.    She used to take blood pressure medication, but she does not take it anymore because it made her feel funny. She checks her blood pressure at home and it is not high. She forgets to check her blood pressure when she is out driving.    She did not make it for her mammogram. She saw neurology last month. She had a pelvic ultrasound. She denies any pelvic pain. She has not seen an OB/GYN since her son was born. She was put on vitamin B12 injections because her vitamins were low. She always had a problem with her vitamin B12. She did 1 injection a week for 4 weeks and then 1 injection a month. She was told to keep taking the pill too. She was not told about her vitamin D. She had bariatric surgery 15 years ago. She has gained almost all of her weight back because of her foot. She is depressed about that. Her ideal weight is 155 to 160. She does not eat a lot. She does not drink soda. She has a sandwich at lunchtime. She does not use salt. She does not eat sweets because it makes her sick. She eats 1 meal a day. She does a lot of walking on purpose. She has been doing the bike at the [employer] 3 days a week. She goes to the pool.    She thinks she has trigger finger. It was worse last week. She has been trying to exercise it. It went away, but then it came back. She has been putting ice and [place]. It feels a little better and she can move it.    Her mood is okay. She has good days and bad days. She is on Lexapro  10 mg.      Patient Care Team:  Carlos Palacios MD as PCP - General (Family Medicine)  Carlos Palacios MD as PCP - PCP-[employer] ([facility])  Akbar Antonio MD as PCP - PCP-[facility] ([facility])     Review of Systems:     Review of Systems   Constitutional:  Negative for fever and unexpected weight change.   HENT:  Negative for nosebleeds and trouble swallowing.    Eyes:  Negative for visual disturbance.   Respiratory:  Negative for chest tightness and shortness of breath.    Cardiovascular:  Negative for chest pain, palpitations and leg swelling.   Gastrointestinal:  Negative for abdominal pain, constipation, diarrhea and nausea.   Endocrine: Negative for cold intolerance.   Genitourinary:  Negative for dysuria and urgency.   Musculoskeletal:  Positive for arthralgias, back pain and gait problem. Negative for joint swelling and myalgias.   Skin:  Negative for rash.   Neurological:  Negative for tremors, seizures and syncope.   Hematological:  Does not bruise/bleed easily.   Psychiatric/Behavioral:  Positive for dysphoric mood. Negative for hallucinations and suicidal ideas.         Problem List:     Patient Active Problem List   Diagnosis    Colon polyp    Internal hemorrhoids    Family history of colorectal cancer    Vitamin D deficiency    Iron deficiency    H/O gastric bypass    B12 deficiency    Moderate episode of recurrent major depressive disorder (HCC)    Fibromyalgia    Chronic low back pain with left-sided sciatica    Bilateral hip pain    Obesity, Class II, BMI 35-39.9    Encounter for surgical aftercare following surgery of digestive system    Postsurgical malabsorption    Constipation    Bariatric surgery status    Sacroiliitis (HCC)    Piriformis syndrome of left side    Piriformis syndrome of right side    Trochanteric bursitis of both hips    Lumbar facet arthropathy    Osteopenia    Anxiety    Lumbar foraminal stenosis    Spinal stenosis of lumbar region with neurogenic claudication     Chronic pain syndrome    Spasm of right piriformis muscle    Insomnia    Primary hypertension    On tricyclic antidepressant therapy    CPAP (continuous positive airway pressure) dependence    Sleep apnea    Class 2 severe obesity due to excess calories with serious comorbidity and body mass index (BMI) of 36.0 to 36.9 in adult     Ischial bursitis of right side    NOAH (obstructive sleep apnea)      Past Medical and Surgical History:     Past Medical History:   Diagnosis Date    Anemia     hx of iron    Anxiety     Cataract     Chronic pain disorder     back radiates down legs    Colon polyp     COVID 2020    CPAP (continuous positive airway pressure) dependence     Fibromyalgia     Fibromyalgia, primary     Hypertension     Lumbar radiculopathy     Medical history unknown     Osteopenia     Partial tear of right Achilles tendon     PONV (postoperative nausea and vomiting)     Sleep apnea     Sleep apnea, obstructive     Spinal stenosis     Staph infection     fingers years ago    Use of cane as ambulatory aid      Past Surgical History:   Procedure Laterality Date    ACHILLES TENDON SURGERY Right 2022    Procedure: ACHILLES TENDON PARITAL TEAR REPAIR;  Surgeon: Ed Seaman DPM;  Location:  MAIN OR;  Service: Podiatry    BUNIONECTOMY Right 2022    Procedure: HAGLUNDS REPAIR;  Surgeon: Ed Seaman DPM;  Location:  MAIN OR;  Service: Podiatry    CARPAL TUNNEL RELEASE Bilateral     20 y.o. with left ulnar nerve release.      CARPAL TUNNEL RELEASE Bilateral      SECTION      COLONOSCOPY      ELBOW SURGERY      Tennis elbow    GASTRIC BYPASS      KY MILAN IMPLTJ NSTIM ELTRDS PLATE/PADDLE EDRL Left 08/15/2022    Procedure: Thoracic laminectomies for placement of spinal cord stimulator lead and internal pulse generator, left-sided pulse generator, with neuro monitoring;  Surgeon: Fransisco Faye MD;  Location:  MAIN OR;  Service: Neurosurgery    REDUCTION MAMMAPLASTY Bilateral      SPINAL CORD STIMULATOR IMPLANT  2021    WISDOM TOOTH EXTRACTION        Family History:     Family History   Problem Relation Age of Onset    Ovarian cancer Mother     Heart disease Mother     Asthma Mother     COPD Mother     Heart failure Mother     Colon cancer Father     Diabetes Father     Diabetes Maternal Grandmother         Aunt    No Known Problems Maternal Grandfather     Diabetes Paternal Grandmother     No Known Problems Paternal Grandfather     No Known Problems Son     Leukemia Maternal Uncle     Diabetes Paternal Aunt     Diabetes Paternal Uncle       Social History:     Social History     Socioeconomic History    Marital status:      Spouse name: Not on file    Number of children: Not on file    Years of education: Not on file    Highest education level: Not on file   Occupational History    Not on file   Tobacco Use    Smoking status: Never    Smokeless tobacco: Never   Vaping Use    Vaping status: Never Used   Substance and Sexual Activity    Alcohol use: Yes     Alcohol/week: 1.0 standard drink of alcohol     Types: 1 Glasses of wine per week     Comment: Maybe one a month if that    Drug use: Never    Sexual activity: Not Currently     Partners: Male     Birth control/protection: Abstinence, None   Other Topics Concern    Not on file   Social History Narrative    Not on file     Social Determinants of Health     Financial Resource Strain: Patient Declined (2/27/2024)    Overall Financial Resource Strain (CARDIA)     Difficulty of Paying Living Expenses: Patient declined   Food Insecurity: Not on file   Transportation Needs: Unknown (2/27/2024)    PRAPARE - Transportation     Lack of Transportation (Medical): No     Lack of Transportation (Non-Medical): Patient declined   Physical Activity: Not on file   Stress: Not on file   Social Connections: Not on file   Intimate Partner Violence: Not on file   Housing Stability: Not on file      Medications and Allergies:     Current Outpatient  "Medications   Medication Sig Dispense Refill    B-D TB SYRINGE 1CC/27GX1/2\" 27G X 1/2\" 1 ML MISC ADMINISTER B12 INJECTION WITH SYRINGE ONCE A WEEK TIMES 4 WEEKS THEN MONTHLY AS DIRECTED.      cyanocobalamin 1,000 mcg/mL       Syringe/Needle, Disp, 27G X 1/2\" 1 ML MISC Administer B12 injection with syringe once a week times 4 weeks then monthly as directed.      baclofen 20 mg tablet Take 1 tablet (20 mg total) by mouth 2 (two) times a day as needed for muscle spasms 180 tablet 0    ergocalciferol (VITAMIN D2) 50,000 units Take 1 capsule (50,000 Units total) by mouth every 28 days 12 capsule 0    escitalopram (LEXAPRO) 10 mg tablet Take 1 tablet (10 mg total) by mouth daily 90 tablet 1    linaCLOtide 145 MCG CAPS Take 1 capsule (145 mcg total) by mouth daily at least 30 minutes prior to the first meal of the day 90 capsule 3    Magnesium 250 MG TABS Take 1 tablet (250 mg total) by mouth in the morning 90 tablet 1    vitamin B-12 (VITAMIN B-12) 500 mcg tablet Take 1 tablet (500 mcg total) by mouth daily 90 tablet 0     No current facility-administered medications for this visit.     No Known Allergies   Immunizations:     Immunization History   Administered Date(s) Administered    H1N1, All Formulations 01/15/2010    INFLUENZA 11/03/2022    Influenza, recombinant, quadrivalent,injectable, preservative free 01/19/2021, 11/22/2021, 11/03/2022      Health Maintenance:         Topic Date Due    Cervical Cancer Screening  Never done    Breast Cancer Screening: Mammogram  01/18/2024    Colorectal Cancer Screening  12/31/2024    HIV Screening  Completed    Hepatitis C Screening  Completed         Topic Date Due    Influenza Vaccine (1) 09/01/2023    COVID-19 Vaccine (1 - 2023-24 season) Never done      Medicare Screening Tests and Risk Assessments:     Hiral is here for her Subsequent Wellness visit.     Health Risk Assessment:   Patient rates overall health as good. Patient feels that their physical health rating is " same. Patient is very dissatisfied with their life. Eyesight was rated as same. Hearing was rated as same. Patient feels that their emotional and mental health rating is same. Patients states they are never, rarely angry. Patient states they are often unusually tired/fatigued. Pain experienced in the last 7 days has been some. Patient's pain rating has been 7/10. Patient states that she has experienced weight loss or gain in last 6 months. Still waiting on someone to call about the shots for my bones ,And i am also trying to get some help with my weight it is making me depressed    Depression Screening:   PHQ-9 Score: 5      Fall Risk Screening:   In the past year, patient has experienced: history of falling in past year    Number of falls: 1  Injured during fall?: Yes    Feels unsteady when standing or walking?: No    Worried about falling?: No      Urinary Incontinence Screening:   Patient has not leaked urine accidently in the last six months.     Home Safety:  Patient has trouble with stairs inside or outside of their home. Patient has working smoke alarms and has working carbon monoxide detector. Home safety hazards include: none.     Nutrition:   Current diet is Regular. I eat as healthy as I can afford    Medications:   Patient is currently taking over-the-counter supplements. OTC medications include: see medication list. Patient is able to manage medications.     Activities of Daily Living (ADLs)/Instrumental Activities of Daily Living (IADLs):   Walk and transfer into and out of bed and chair?: Yes  Dress and groom yourself?: Yes    Bathe or shower yourself?: Yes    Feed yourself? Yes  Do your laundry/housekeeping?: Yes  Manage your money, pay your bills and track your expenses?: Yes  Make your own meals?: Yes    Do your own shopping?: Yes    ADL comments: Sometimes i have trouble with cleaning when my pain is bad    Previous Hospitalizations:   Any hospitalizations or ED visits within the last 12 months?:  No      Advance Care Planning:   Living will: Yes    Durable POA for healthcare: No    Advanced directive: Yes      PREVENTIVE SCREENINGS      Cardiovascular Screening:    General: Screening Current      Diabetes Screening:     General: Screening Current      Colorectal Cancer Screening:     General: Screening Current      Breast Cancer Screening:     General: Screening Current      Lung Cancer Screening:     General: Screening Not Indicated      Hepatitis C Screening:    General: Screening Current    Screening, Brief Intervention, and Referral to Treatment (SBIRT)    Screening  Typical number of drinks in a day: 0  Typical number of drinks in a week: 1  Interpretation: Low risk drinking behavior.    AUDIT-C Screenin) How often did you have a drink containing alcohol in the past year? monthly or less  2) How many drinks did you have on a typical day when you were drinking in the past year? 0  3) How often did you have 6 or more drinks on one occasion in the past year? less than monthly    AUDIT-C Score: 2  Interpretation: Score 0-2 (female): Negative screen for alcohol misuse    Single Item Drug Screening:  How often have you used an illegal drug (including marijuana) or a prescription medication for non-medical reasons in the past year? never    Single Item Drug Screen Score: 0  Interpretation: Negative screen for possible drug use disorder    No results found.     Physical Exam:     There were no vitals taken for this visit.    Physical Exam  Vitals and nursing note reviewed.   Constitutional:       Appearance: She is well-developed. She is obese.   HENT:      Head: Normocephalic and atraumatic.      Right Ear: External ear normal.      Left Ear: External ear normal.      Nose: Nose normal.   Eyes:      Conjunctiva/sclera: Conjunctivae normal.      Pupils: Pupils are equal, round, and reactive to light.   Cardiovascular:      Rate and Rhythm: Normal rate and regular rhythm.      Heart sounds: Normal heart  sounds. No murmur heard.  Pulmonary:      Effort: Pulmonary effort is normal.      Breath sounds: Normal breath sounds. No wheezing.   Abdominal:      General: Bowel sounds are normal.      Palpations: Abdomen is soft.   Musculoskeletal:         General: Tenderness present. Normal range of motion.      Cervical back: Normal range of motion and neck supple.   Lymphadenopathy:      Cervical: No cervical adenopathy.   Skin:     General: Skin is warm and dry.      Capillary Refill: Capillary refill takes less than 2 seconds.   Neurological:      Mental Status: She is alert and oriented to person, place, and time.      Gait: Gait abnormal.   Psychiatric:         Behavior: Behavior normal.         Thought Content: Thought content normal.         Judgment: Judgment normal.          Carlos Palacios MD

## 2024-03-11 ENCOUNTER — HOSPITAL ENCOUNTER (OUTPATIENT)
Dept: ULTRASOUND IMAGING | Facility: HOSPITAL | Age: 59
Discharge: HOME | End: 2024-03-11
Payer: COMMERCIAL

## 2024-03-11 DIAGNOSIS — N83.209 CYST OF OVARY, UNSPECIFIED LATERALITY: ICD-10-CM

## 2024-03-11 PROCEDURE — 76830 TRANSVAGINAL US NON-OB: CPT

## 2024-03-11 PROCEDURE — 76856 US EXAM PELVIC COMPLETE: CPT

## 2024-03-12 ENCOUNTER — OFFICE VISIT (OUTPATIENT)
Dept: OBGYN CLINIC | Facility: CLINIC | Age: 59
End: 2024-03-12
Payer: COMMERCIAL

## 2024-03-12 VITALS — BODY MASS INDEX: 36.09 KG/M2 | HEIGHT: 61 IN | SYSTOLIC BLOOD PRESSURE: 140 MMHG | DIASTOLIC BLOOD PRESSURE: 90 MMHG

## 2024-03-12 DIAGNOSIS — N83.209 CYST OF OVARY, UNSPECIFIED LATERALITY: ICD-10-CM

## 2024-03-12 PROCEDURE — 99203 OFFICE O/P NEW LOW 30 MIN: CPT | Performed by: OBSTETRICS & GYNECOLOGY

## 2024-03-12 NOTE — PROGRESS NOTES
Assessment/Plan:     Diagnoses and all orders for this visit:    Cyst of ovary, unspecified laterality  -     Ambulatory Referral to Obstetrics / Gynecology        59 yo female   Left ovarian cyst noted on MRI  Prior   Post menopause  Currently not sexually active  Prior gastric bypass  Prior multiple orthopedic surgery  History of breast reduction  Plan  Follow-up result of ultrasound and we will call patient based on the finding on ultrasound with recommendation regarding her left ovarian cyst  Patient schedule appointment for annual exam next month  Will call patient with result      Subjective:      Patient ID: Hiral Jaeger is a 58 y.o. female.    HPI  59 yo female    1 C/S  Menopause at 2000  Period always been irreg and no period since giving birth to her son  Denies hot flashes or night sweat  Here today sec to ovarian cyst noted on her ovary   Noted on MRI of the back     MRI results reviewed and discussed with patient  IMPRESSION:   1. Spinal numbering as discussed above.   2.  Dextroscoliosis.   3.  Severe multilevel degenerative changes resulting in spinal canal, lateral   recess, and foraminal stenosis as discussed above. Slightly increased   degenerative changes at L4-5 and L5-S1 compared to 2021.   4.  Severe bilateral facet arthropathy with edema/inflammation at L4-5 and   L5-S1.   5. 3.5-4.0 cm cyst/cystic lesion in the left pelvis. Correlation with a pelvic   ultrasound is recommended.   Patient has ultrasound done yesterday results still pending imaging was reviewed  Finding of left ovarian cyst noted waiting for final radiology results  As patient is asymptomatic most likely will consider repeat ultrasound in 3 to 4 months for follow-up        Denies any complaint denies any nausea vomiting  Denies any vaginal itching odor or discharge  Patient is not sexually active  Denies any pelvic pain  Having problem with her GI follow-up with gastro and up-to-date with  "colonoscopy  Denies any urgency frequency or dysuria      The following portions of the patient's history were reviewed and updated as appropriate: allergies, current medications, past family history, past medical history, past social history, past surgical history and problem list.    Review of Systems      Objective:      /90 (BP Location: Left arm, Patient Position: Sitting, Cuff Size: Adult)   Ht 5' 1\" (1.549 m)   BMI 36.09 kg/m²          Physical Exam  Constitutional:       Appearance: Normal appearance.   Neurological:      General: No focal deficit present.      Mental Status: She is alert and oriented to person, place, and time.   Psychiatric:         Mood and Affect: Mood normal.         Behavior: Behavior normal.      Patient is overdue for annual exam patient desired to reschedule till next month declined exam today  "

## 2024-03-18 LAB
25(OH)D3+25(OH)D2 SERPL-MCNC: 27 NG/ML (ref 30–100)
ALBUMIN SERPL-MCNC: 4.2 G/DL (ref 3.5–5.7)
ALP SERPL-CCNC: 68 U/L (ref 35–120)
ALT SERPL-CCNC: 14 U/L
ANION GAP SERPL CALCULATED.3IONS-SCNC: 10 MMOL/L (ref 3–11)
AST SERPL-CCNC: 18 U/L
BASOPHILS # BLD AUTO: 0 THOU/CMM (ref 0–0.1)
BASOPHILS NFR BLD AUTO: 1 %
BILIRUB SERPL-MCNC: 0.7 MG/DL (ref 0.2–1)
BUN SERPL-MCNC: 16 MG/DL (ref 7–25)
CALCIUM SERPL-MCNC: 9.2 MG/DL (ref 8.5–10.1)
CHLORIDE SERPL-SCNC: 102 MMOL/L (ref 100–109)
CHOLEST SERPL-MCNC: 177 MG/DL
CHOLEST/HDLC SERPL: 2.4 {RATIO}
CO2 SERPL-SCNC: 28 MMOL/L (ref 21–31)
CREAT SERPL-MCNC: 0.59 MG/DL (ref 0.4–1.1)
CYTOLOGY CMNT CVX/VAG CYTO-IMP: NORMAL
DIFFERENTIAL METHOD BLD: NORMAL
EOSINOPHIL # BLD AUTO: 0.1 THOU/CMM (ref 0–0.5)
EOSINOPHIL NFR BLD AUTO: 2 %
ERYTHROCYTE [DISTWIDTH] IN BLOOD BY AUTOMATED COUNT: 12.1 % (ref 12–16)
GFR/BSA.PRED SERPLBLD CYS-BASED-ARV: 104 ML/MIN/{1.73_M2}
GLUCOSE SERPL-MCNC: 93 MG/DL (ref 65–99)
HCT VFR BLD AUTO: 41.3 % (ref 35–43)
HDLC SERPL-MCNC: 74 MG/DL (ref 23–92)
HGB BLD-MCNC: 13.7 G/DL (ref 11.5–14.5)
LDLC SERPL CALC-MCNC: 88 MG/DL
LYMPHOCYTES # BLD AUTO: 1.4 THOU/CMM (ref 1–3)
LYMPHOCYTES NFR BLD AUTO: 24 %
MCH RBC QN AUTO: 30.6 PG (ref 26–34)
MCHC RBC AUTO-ENTMCNC: 33.3 G/DL (ref 32–37)
MCV RBC AUTO: 92 FL (ref 80–100)
MONOCYTES # BLD AUTO: 0.4 THOU/CMM (ref 0.3–1)
MONOCYTES NFR BLD AUTO: 7 %
NEUTROPHILS # BLD AUTO: 3.8 THOU/CMM (ref 1.8–7.8)
NEUTROPHILS NFR BLD AUTO: 66 %
NONHDLC SERPL-MCNC: 103 MG/DL
PLATELET # BLD AUTO: 256 THOU/CMM (ref 140–350)
PMV BLD REES-ECKER: 8.8 FL (ref 7.5–11.3)
POTASSIUM SERPL-SCNC: 4.5 MMOL/L (ref 3.5–5.2)
PROT SERPL-MCNC: 7 G/DL (ref 6.3–8.3)
RBC # BLD AUTO: 4.49 MILL/CMM (ref 3.7–4.7)
SODIUM SERPL-SCNC: 140 MMOL/L (ref 135–145)
TRIGL SERPL-MCNC: 75 MG/DL
TSH SERPL-ACNC: 1.97 UIU/ML (ref 0.45–5.33)
VIT B12 SERPL-MCNC: 1070 PG/ML (ref 180–914)
WBC # BLD AUTO: 5.7 THOU/CMM (ref 4–10)

## 2024-03-19 DIAGNOSIS — N83.202 LEFT OVARIAN CYST: ICD-10-CM

## 2024-03-19 DIAGNOSIS — R93.89 ABNORMAL ULTRASOUND: Primary | ICD-10-CM

## 2024-03-25 ENCOUNTER — TELEPHONE (OUTPATIENT)
Dept: FAMILY MEDICINE CLINIC | Facility: CLINIC | Age: 59
End: 2024-03-25

## 2024-03-25 NOTE — TELEPHONE ENCOUNTER
----- Message from Carlos Palacios MD sent at 3/25/2024  5:41 AM EDT -----  Cholesterols all look just fine  Thyroid looks good  Vitamin D is a little bit low take 2000 units daily  B12 looks good  Sugar is good electrolytes are good liver is good kidney is good  No anemia white blood cell count looks good

## 2024-03-25 NOTE — TELEPHONE ENCOUNTER
Patient advised.    CHIEF COMPLAINT: Cough and SOB    HPI:  28-year-old male with severe persistent, eosinophilic asthma, on benralizumab,presents with progressive worseniing of symptoms.  He has felt worse over months.  With worsening shortness of breath, coughing and wheeze cousing post tussive vomiting and syncope.  He has had an increase and change in color of his sputum. Since his benralizumab finished the loading period and was changed to q8 weeks he has noticed a slow decline.  (last injection was beginning of January, due on 2/26).  Occasional chills/subjective fevers, symptoms worse at night and exertion. He had Flu A in january and had improved from that but feels worse again over a few weeks.  No antibiotic or steroid use, no recent sick contacts or travel. Has chronic post-nasal drip, but no other URI symptoms.  Post tussive vomiting but no other GI Symptoms.  Denies GERD, chest pain, palpitations, anxiety, nausea, abdominal pain, diarrhea, constipations, dysuria, hematuria.  He was tested for allergies by skin test and has multiple allergies though denies any change in home or work environment   non smoker, [facility], works as a .   PAST MEDICAL & SURGICAL HISTORY:  Moderate persistent asthma with acute exacerbation  No significant past surgical history      FAMILY HISTORY:  Family history of asthma in grandfather (Grandparent)      SOCIAL HISTORY:  Smoking: [x] Never Smoked [ ] Former Smoker (__ packs x ___ years) [ ] Current Smoker  (__ packs x ___ years)  Substance Use: [ ] Never Used [ ] Used ____  EtOH Use:  Marital Status: [ ] Single [ ]  [ ]  [ ]   Sexual History:   Occupation:  Recent Travel:  Country of Birth:  Advance Directives:    Allergies    No Known Allergies    Intolerances        HOME MEDICATIONS: Reveiwed in outpt EMR    REVIEW OF SYSTEMS:  Constitutional: [ ] negative [ ] fevers [x ] chills [ ] weight loss [ ] weight gain  HEENT: [ ] negative [ ] dry eyes [ ] eye irritation [x ] postnasal drip [ ] nasal congestion  CV: [x ] negative  [ ] chest pain [ ] orthopnea [ ] palpitations [ ] murmur  Resp: see HPI  GI: [ ] negative [ ] nausea [x ] vomiting [ ] diarrhea [ ] constipation [ ] abd pain [ ] dysphagia   : [x ] negative [ ] dysuria [ ] nocturia [ ] hematuria [ ] increased urinary frequency  Musculoskeletal: [ x] negative [ ] back pain [ ] myalgias [ ] arthralgias [ ] fracture  Skin: [x ] negative [ ] rash [ ] itch  Neurological: [ ] negative [ ] headache [ ] dizziness [x ] syncope [ ] weakness [ ] numbness  Psychiatric: x[ ] negative [ ] anxiety [ ] depression  Endocrine: [x ] negative [ ] diabetes [ ] thyroid problem  Hematologic/Lymphatic: [ ] negative [ ] anemia [ ] bleeding problem  Allergic/Immunologic:  Skin test with multiple indoor and out door allergies  [ ] All other systems negative  [ ] Unable to assess ROS because ________    OBJECTIVE:  ICU Vital Signs Last 24 Hrs  T(C): 36.8 (19 Feb 2019 05:42), Max: 36.9 (18 Feb 2019 19:46)  T(F): 98.3 (19 Feb 2019 05:42), Max: 98.4 (18 Feb 2019 19:46)  HR: 109 (19 Feb 2019 13:43) (109 - 138)  BP: 133/67 (19 Feb 2019 05:42) (113/83 - 134/90)  BP(mean): --  ABP: --  ABP(mean): --  RR: 20 (19 Feb 2019 05:42) (20 - 30)  SpO2: 98% (19 Feb 2019 13:43) (93% - 98%)        CAPILLARY BLOOD GLUCOSE          PHYSICAL EXAM:  General: Mild distress with talking.  Other wise sitting comfortable in bed.   HEENT: No LAD, no tonsilar erythema, some cobble stoning.   Neck: No JVD  Respiratory:  At rest comfortable with talikng starts to become tachypnic.  Diffuse wheezing and significantly prolonged inspiratory time.   Cardiovascular: tachycardic otherwise normal.   Abdomen: soft NT ND  Extremities: no edema, cyanosis or clubbing  Skin: No rash  Neurological: no focla deficit noted  Psychiatry: Normal mood and affect.     HOSPITAL MEDICATIONS:  Standing Meds:  ALBUTerol/ipratropium for Nebulization 3 milliLiter(s) Nebulizer every 4 hours  buDESOnide 160 MICROgram(s)/formoterol 4.5 MICROgram(s) Inhaler 2 Puff(s) Inhalation two times a day  fluticasone propionate 50 MICROgram(s)/spray Nasal Spray 1 Spray(s) Both Nostrils two times a day  methylPREDNISolone sodium succinate Injectable 40 milliGRAM(s) IV Push two times a day      PRN Meds:  sodium chloride 0.65% Nasal 1 Spray(s) Both Nostrils two times a day PRN      LABS:                        16.0   15.29 )-----------( 308      ( 18 Feb 2019 22:00 )             49.2     Hgb Trend: 16.0<--  02-18    140  |  102  |  13  ----------------------------<  98  4.1   |  24  |  1.12    Ca    9.4      18 Feb 2019 22:00    TPro  7.8  /  Alb  4.5  /  TBili  0.7  /  DBili  x   /  AST  25  /  ALT  27  /  AlkPhos  86  02-18    Creatinine Trend: 1.12<--        Venous Blood Gas:  02-19 @ 00:45  7.33/52/30/23/46.9  VBG Lactate: 2.9  Venous Blood Gas:  02-18 @ 22:20  7.33/53/27/23/41.9  VBG Lactate: 2.0      MICROBIOLOGY:     RADIOLOGY:  [x] Reviewed and interpreted by me  UNABLE to review IMaging due to EMR dysfunction.   Report reads "clear lungs"  PULMONARY FUNCTION TESTS:    EKG:

## 2024-04-05 ENCOUNTER — TELEPHONE (OUTPATIENT)
Dept: PAIN MEDICINE | Facility: CLINIC | Age: 59
End: 2024-04-05

## 2024-04-05 ENCOUNTER — OFFICE VISIT (OUTPATIENT)
Dept: PAIN MEDICINE | Facility: CLINIC | Age: 59
End: 2024-04-05
Payer: COMMERCIAL

## 2024-04-05 VITALS
WEIGHT: 200 LBS | HEIGHT: 61 IN | SYSTOLIC BLOOD PRESSURE: 159 MMHG | DIASTOLIC BLOOD PRESSURE: 86 MMHG | BODY MASS INDEX: 37.76 KG/M2 | HEART RATE: 92 BPM

## 2024-04-05 DIAGNOSIS — M70.71 ISCHIAL BURSITIS OF RIGHT SIDE: Primary | ICD-10-CM

## 2024-04-05 PROCEDURE — 99214 OFFICE O/P EST MOD 30 MIN: CPT | Performed by: PHYSICAL MEDICINE & REHABILITATION

## 2024-04-05 NOTE — H&P (VIEW-ONLY)
Pain Medicine Follow-Up Note    Assessment:  1. Ischial bursitis of right side        Plan:    Ms. Jaeger is a pleasant 59-year-old female [facility] a previous right ischial bursa steroid injection in June 2023 presents to [facility] for follow-up and reevaluation regarding ongoing right buttock pain.  She reports nearly 10 months duration of significant greater than 75% relief in her pain and a gradual return of symptoms.  During today's evaluation she does continue to demonstrate clinical evidence of isolated ischial bursitis on the right and intermittent radicular pattern pain into the anterolateral right leg.  Would proceed with right ischial bursa steroid injection under fluoroscopy guidance.  Also advised patient to speak with Lobo from [place] regarding making adjustments to her current spinal cord stimulator.  At this time we will proceed with injection and all questions have been answered.  Complete risks and benefits including bleeding, infection, tissue reaction, nerve injury and allergic reaction were discussed. The approach was demonstrated using models and literature was provided. Verbal and written consent was obtained.    History of Present Illness:    Hiral Jaeger is a 59 y.o. female who presents to [facility] and Pain [facility] for interval re-evaluation of the above stated pain complaints. The patient has a past medical and chronic pain history as outlined in the assessment section.  Patient presents to [facility] pain [facility] for follow-up and reevaluation regarding ongoing low back pain with radiating symptoms into the right lower extremity.  We previously performed a right-sided ischial bursa injection June 12, 2023 with significant improvements in her pain and a gradual return of symptoms.  Presents today for follow-up and reevaluation.  Currently rating 5-7 out of 10 pain described as an  occasional throbbing, aching sensation.  Presents for follow-up  Other than as stated above, the patient denies any interval changes in medications, medical condition, mental condition, symptoms, or allergies since the last office visit.         Review of Systems:    Review of Systems   Constitutional:  Negative for unexpected weight change.   HENT:  Negative for ear pain.    Eyes:  Negative for visual disturbance.   Respiratory:  Negative for shortness of breath and wheezing.    Gastrointestinal:  Negative for abdominal pain.   Musculoskeletal:  Positive for back pain, gait problem and joint swelling.   Neurological:  Positive for dizziness, weakness and numbness.   Psychiatric/Behavioral:  Positive for dysphoric mood. Negative for decreased concentration. The patient is nervous/anxious.          Past Medical History:   Diagnosis Date    Anemia     hx of iron    Anxiety     Cataract     Chronic pain disorder     back radiates down legs    Colon polyp     COVID 2020    CPAP (continuous positive airway pressure) dependence     Fibromyalgia     Fibromyalgia, primary     Hypertension     Lumbar radiculopathy     Medical history unknown     Osteopenia     Ovarian cyst     Partial tear of right Achilles tendon     PONV (postoperative nausea and vomiting)     Sleep apnea     Sleep apnea, obstructive     Spinal stenosis     Staph infection     fingers years ago    Use of cane as ambulatory aid        Past Surgical History:   Procedure Laterality Date    ACHILLES TENDON SURGERY Right 2022    Procedure: ACHILLES TENDON PARITAL TEAR REPAIR;  Surgeon: Ed Seaman DPM;  Location:  MAIN OR;  Service: Podiatry    BUNIONECTOMY Right 2022    Procedure: HAGLUNDS REPAIR;  Surgeon: Ed Seaman DPM;  Location:  MAIN OR;  Service: Podiatry    CARPAL TUNNEL RELEASE Bilateral     20 y.o. with left ulnar nerve release.      CARPAL TUNNEL RELEASE Bilateral      SECTION      COLONOSCOPY      ELBOW SURGERY    "   Tennis elbow    GASTRIC BYPASS      [facility] IMPLTJ NSTIM ELTRDS PLATE/PADDLE EDRL Left 08/15/2022    Procedure: Thoracic laminectomies for placement of spinal cord stimulator lead and internal pulse generator, left-sided pulse generator, with neuro monitoring;  Surgeon: Fransisco Faye MD;  Location:  MAIN OR;  Service: Neurosurgery    REDUCTION MAMMAPLASTY Bilateral     SPINAL CORD STIMULATOR IMPLANT  2021    WISDOM TOOTH EXTRACTION         Family History   Problem Relation Age of Onset    Ovarian cancer Mother     Heart disease Mother     Asthma Mother     COPD Mother     Heart failure Mother     Colon cancer Father     Diabetes Father     Diabetes Maternal Grandmother         Aunt    No Known Problems Maternal Grandfather     Diabetes Paternal Grandmother     No Known Problems Paternal Grandfather     No Known Problems Son     Leukemia Maternal Uncle     Diabetes Paternal Aunt     Diabetes Paternal Uncle        Social History     Occupational History    Not on file   Tobacco Use    Smoking status: Never    Smokeless tobacco: Never   Vaping Use    Vaping status: Never Used   Substance and Sexual Activity    Alcohol use: Yes     Alcohol/week: 1.0 standard drink of alcohol     Types: 1 Glasses of wine per week     Comment: Maybe one a month if that    Drug use: Never    Sexual activity: Not Currently     Partners: Male     Birth control/protection: Abstinence, None         Current Outpatient Medications:     B-D TB SYRINGE 1CC/27GX1/2\" 27G X 1/2\" 1 ML MISC, ADMINISTER B12 INJECTION WITH SYRINGE ONCE A WEEK TIMES 4 WEEKS THEN MONTHLY AS DIRECTED., Disp: , Rfl:     baclofen 20 mg tablet, Take 1 tablet (20 mg total) by mouth 2 (two) times a day as needed for muscle spasms, Disp: 180 tablet, Rfl: 0    cyanocobalamin 1,000 mcg/mL, , Disp: , Rfl:     ergocalciferol (VITAMIN D2) 50,000 units, Take 1 capsule (50,000 Units total) by mouth every 28 days, Disp: 12 capsule, Rfl: 0    escitalopram (LEXAPRO) 10 mg " "tablet, Take 1 tablet (10 mg total) by mouth daily, Disp: 90 tablet, Rfl: 1    linaCLOtide 145 MCG CAPS, Take 1 capsule (145 mcg total) by mouth daily at least 30 minutes prior to the first meal of the day, Disp: 90 capsule, Rfl: 3    Magnesium 250 MG TABS, Take 1 tablet (250 mg total) by mouth in the morning, Disp: 90 tablet, Rfl: 1    Syringe/Needle, Disp, 27G X 1/2\" 1 ML MISC, Administer B12 injection with syringe once a week times 4 weeks then monthly as directed., Disp: , Rfl:     vitamin B-12 (VITAMIN B-12) 500 mcg tablet, Take 1 tablet (500 mcg total) by mouth daily, Disp: 90 tablet, Rfl: 0    No Known Allergies    Physical Exam:    /86   Pulse 92   Ht 5' 1\" (1.549 m)   Wt 90.7 kg (200 lb)   BMI 37.79 kg/m²     Constitutional:normal, well developed, well nourished, alert, in no distress and non-toxic and no overt pain behavior.  Eyes:anicteric  HEENT:grossly intact  Neck:supple, symmetric, trachea midline and no masses   Pulmonary:even and unlabored  Cardiovascular:No edema or pitting edema present  Skin:Normal without rashes or lesions and well hydrated  Psychiatric:Mood and affect appropriate  Neurologic:Cranial Nerves II-XII grossly intact  Musculoskeletal:normal      Imaging  No orders to display         No orders of the defined types were placed in this encounter.    "

## 2024-04-05 NOTE — PROGRESS NOTES
Pain Medicine Follow-Up Note    Assessment:  1. Ischial bursitis of right side        Plan:    Ms. Jaeger is a pleasant 59-year-old female significant past medical history of [facility] and a previous right ischial bursa steroid injection in June 2023 presents to [facility] for follow-up and reevaluation regarding ongoing right buttock pain.  She reports nearly 10 months duration of significant greater than 75% relief in her pain and a gradual return of symptoms.  During today's evaluation she does continue to demonstrate clinical evidence of isolated ischial bursitis on the right and intermittent radicular pattern pain into the anterolateral right leg.  Would proceed with right ischial bursa steroid injection under fluoroscopy guidance.  Also advised patient to speak with Lobo from [place] regarding making adjustments to her current spinal cord stimulator.  At this time we will proceed with injection and all questions have been answered.  Complete risks and benefits including bleeding, infection, tissue reaction, nerve injury and allergic reaction were discussed. The approach was demonstrated using models and literature was provided. Verbal and written consent was obtained.    History of Present Illness:    Hiral Jaeger is a 59 y.o. female who presents to [facility] and Pain [facility] for interval re-evaluation of the above stated pain complaints. The patient has a past medical and chronic pain history as outlined in the assessment section.  Patient presents to [facility] pain [facility] for follow-up and reevaluation regarding ongoing low back pain with radiating symptoms into the right lower extremity.  We previously performed a right-sided ischial bursa injection June 12, 2023 with significant improvements in her pain and a gradual return of symptoms.  Presents today for follow-up and reevaluation.  Currently rating 5-7 out of 10 pain described as an  occasional throbbing, aching sensation.  Presents for follow-up  Other than as stated above, the patient denies any interval changes in medications, medical condition, mental condition, symptoms, or allergies since the last office visit.         Review of Systems:    Review of Systems   Constitutional:  Negative for unexpected weight change.   HENT:  Negative for ear pain.    Eyes:  Negative for visual disturbance.   Respiratory:  Negative for shortness of breath and wheezing.    Gastrointestinal:  Negative for abdominal pain.   Musculoskeletal:  Positive for back pain, gait problem and joint swelling.   Neurological:  Positive for dizziness, weakness and numbness.   Psychiatric/Behavioral:  Positive for dysphoric mood. Negative for decreased concentration. The patient is nervous/anxious.          Past Medical History:   Diagnosis Date    Anemia     hx of iron    Anxiety     Cataract     Chronic pain disorder     back radiates down legs    Colon polyp     COVID 2020    CPAP (continuous positive airway pressure) dependence     Fibromyalgia     Fibromyalgia, primary     Hypertension     Lumbar radiculopathy     Medical history unknown     Osteopenia     Ovarian cyst     Partial tear of right Achilles tendon     PONV (postoperative nausea and vomiting)     Sleep apnea     Sleep apnea, obstructive     Spinal stenosis     Staph infection     fingers years ago    Use of cane as ambulatory aid        Past Surgical History:   Procedure Laterality Date    ACHILLES TENDON SURGERY Right 2022    Procedure: ACHILLES TENDON PARITAL TEAR REPAIR;  Surgeon: Ed Seaman DPM;  Location:  MAIN OR;  Service: Podiatry    BUNIONECTOMY Right 2022    Procedure: HAGLUNDS REPAIR;  Surgeon: Ed Seaman DPM;  Location:  MAIN OR;  Service: Podiatry    CARPAL TUNNEL RELEASE Bilateral     20 y.o. with left ulnar nerve release.      CARPAL TUNNEL RELEASE Bilateral      SECTION      COLONOSCOPY      ELBOW SURGERY    "   Tennis elbow    GASTRIC BYPASS      [facility] IMPLTJ NSTIM ELTRDS PLATE/PADDLE EDRL Left 08/15/2022    Procedure: Thoracic laminectomies for placement of spinal cord stimulator lead and internal pulse generator, left-sided pulse generator, with neuro monitoring;  Surgeon: Fransisco Faye MD;  Location:  MAIN OR;  Service: Neurosurgery    REDUCTION MAMMAPLASTY Bilateral     SPINAL CORD STIMULATOR IMPLANT  2021    WISDOM TOOTH EXTRACTION         Family History   Problem Relation Age of Onset    Ovarian cancer Mother     Heart disease Mother     Asthma Mother     COPD Mother     Heart failure Mother     Colon cancer Father     Diabetes Father     Diabetes Maternal Grandmother         Aunt    No Known Problems Maternal Grandfather     Diabetes Paternal Grandmother     No Known Problems Paternal Grandfather     No Known Problems Son     Leukemia Maternal Uncle     Diabetes Paternal Aunt     Diabetes Paternal Uncle        Social History     Occupational History    Not on file   Tobacco Use    Smoking status: Never    Smokeless tobacco: Never   Vaping Use    Vaping status: Never Used   Substance and Sexual Activity    Alcohol use: Yes     Alcohol/week: 1.0 standard drink of alcohol     Types: 1 Glasses of wine per week     Comment: Maybe one a month if that    Drug use: Never    Sexual activity: Not Currently     Partners: Male     Birth control/protection: Abstinence, None         Current Outpatient Medications:     B-D TB SYRINGE 1CC/27GX1/2\" 27G X 1/2\" 1 ML MISC, ADMINISTER B12 INJECTION WITH SYRINGE ONCE A WEEK TIMES 4 WEEKS THEN MONTHLY AS DIRECTED., Disp: , Rfl:     baclofen 20 mg tablet, Take 1 tablet (20 mg total) by mouth 2 (two) times a day as needed for muscle spasms, Disp: 180 tablet, Rfl: 0    cyanocobalamin 1,000 mcg/mL, , Disp: , Rfl:     ergocalciferol (VITAMIN D2) 50,000 units, Take 1 capsule (50,000 Units total) by mouth every 28 days, Disp: 12 capsule, Rfl: 0    escitalopram (LEXAPRO) 10 mg " "tablet, Take 1 tablet (10 mg total) by mouth daily, Disp: 90 tablet, Rfl: 1    linaCLOtide 145 MCG CAPS, Take 1 capsule (145 mcg total) by mouth daily at least 30 minutes prior to the first meal of the day, Disp: 90 capsule, Rfl: 3    Magnesium 250 MG TABS, Take 1 tablet (250 mg total) by mouth in the morning, Disp: 90 tablet, Rfl: 1    Syringe/Needle, Disp, 27G X 1/2\" 1 ML MISC, Administer B12 injection with syringe once a week times 4 weeks then monthly as directed., Disp: , Rfl:     vitamin B-12 (VITAMIN B-12) 500 mcg tablet, Take 1 tablet (500 mcg total) by mouth daily, Disp: 90 tablet, Rfl: 0    No Known Allergies    Physical Exam:    /86   Pulse 92   Ht 5' 1\" (1.549 m)   Wt 90.7 kg (200 lb)   BMI 37.79 kg/m²     Constitutional:normal, well developed, well nourished, alert, in no distress and non-toxic and no overt pain behavior.  Eyes:anicteric  HEENT:grossly intact  Neck:supple, symmetric, trachea midline and no masses   Pulmonary:even and unlabored  Cardiovascular:No edema or pitting edema present  Skin:Normal without rashes or lesions and well hydrated  Psychiatric:Mood and affect appropriate  Neurologic:Cranial Nerves II-XII grossly intact  Musculoskeletal:normal      Imaging  No orders to display         No orders of the defined types were placed in this encounter.    "

## 2024-04-05 NOTE — TELEPHONE ENCOUNTER
Scheduled patient for right bursa injection 04/24/2024  Patient denies RX blood thinners/ NSAIDS  Nothing to eat or drink 1 hour prior to procedure  Needs to arrange transportation  Proper clothing for procedure  No vaccines 2 weeks prior or after procedure  If ill or place on antibiotics, please call to reschedule

## 2024-04-11 ENCOUNTER — ANNUAL EXAM (OUTPATIENT)
Dept: OBGYN CLINIC | Facility: CLINIC | Age: 59
End: 2024-04-11
Payer: COMMERCIAL

## 2024-04-11 VITALS — SYSTOLIC BLOOD PRESSURE: 152 MMHG | BODY MASS INDEX: 37.79 KG/M2 | HEIGHT: 61 IN | DIASTOLIC BLOOD PRESSURE: 82 MMHG

## 2024-04-11 DIAGNOSIS — Z80.41 FAMILY HISTORY OF OVARIAN CANCER: ICD-10-CM

## 2024-04-11 DIAGNOSIS — Z01.419 WOMEN'S ANNUAL ROUTINE GYNECOLOGICAL EXAMINATION: Primary | ICD-10-CM

## 2024-04-11 DIAGNOSIS — Z11.51 SCREENING FOR HPV (HUMAN PAPILLOMAVIRUS): ICD-10-CM

## 2024-04-11 DIAGNOSIS — Z12.4 SCREENING FOR MALIGNANT NEOPLASM OF THE CERVIX: ICD-10-CM

## 2024-04-11 PROCEDURE — G0101 CA SCREEN;PELVIC/BREAST EXAM: HCPCS | Performed by: OBSTETRICS & GYNECOLOGY

## 2024-04-11 PROCEDURE — G0145 SCR C/V CYTO,THINLAYER,RESCR: HCPCS | Performed by: OBSTETRICS & GYNECOLOGY

## 2024-04-11 PROCEDURE — G0476 HPV COMBO ASSAY CA SCREEN: HCPCS | Performed by: OBSTETRICS & GYNECOLOGY

## 2024-04-11 NOTE — PROGRESS NOTES
"Subjective      Hiral Jaeger is a 59 y.o. female who presents for annual well woman exam.     No PMB   History of abnormal Pap smear: no  Family history of uterine or ovarian cancer: yes - MOTHER OVARIAN     Family history of breast cancer: no    Menstrual History:  OB History          1    Para   1    Term   1            AB        Living   1         SAB        IAB        Ectopic        Multiple        Live Births                      No LMP recorded. Patient is postmenopausal.       The following portions of the patient's history were reviewed and updated as appropriate: allergies, current medications, past family history, past medical history, past social history, past surgical history, and problem list.    Review of Systems  Review of Systems   Constitutional:  Positive for fatigue. Negative for activity change, appetite change, chills and fever.   Respiratory:  Negative for apnea, cough, chest tightness and shortness of breath.    Cardiovascular:  Negative for chest pain, palpitations and leg swelling.   Gastrointestinal:  Negative for abdominal pain, constipation, diarrhea, nausea and vomiting.   Genitourinary:  Negative for difficulty urinating, dysuria, flank pain, frequency, hematuria and urgency.   Neurological:  Negative for dizziness, seizures, syncope, light-headedness, numbness and headaches.   Psychiatric/Behavioral:  Negative for agitation and confusion.           Objective      /82 (BP Location: Left arm, Patient Position: Sitting, Cuff Size: Adult)   Ht 5' 1\" (1.549 m)   BMI 37.79 kg/m²     Physical Exam  OBGyn Exam     General:   alert and oriented, in no acute distress, alert, appears stated age, and cooperative   Heart: regular rate and rhythm, S1, S2 normal, no murmur, click, rub or gallop   Lungs: clear to auscultation bilaterally   Abdomen: soft, non-tender, without masses or organomegaly   Vulva: normal   Vagina: normal mucosa, normal discharge   Cervix: no cervical " motion tenderness and no lesions   Uterus: normal size   Adnexa:  Breast Exam:  normal adnexa  breasts appear normal, no suspicious masses, no skin or nipple changes or axillary nodes.                Assessment      @well woman@ .   58 yo female   Left ovarian cyst   Prior   Post menopause  Currently not sexually active  Prior gastric bypass  Prior multiple orthopedic surgery  History of breast reduction  Plan   PAP/HPV  Diet/exercise  Ca/VIT D  MAMMO  DEXA  Genetic testing referral sec to  ovarian cancer   Pelvic MRI  if reassuring conisder repeat pelvic U/S in 1y  Rto for annual exam    All questions answered.     There are no Patient Instructions on file for this visit.

## 2024-04-12 LAB
HPV HR 12 DNA CVX QL NAA+PROBE: POSITIVE
HPV16 DNA CVX QL NAA+PROBE: NEGATIVE
HPV18 DNA CVX QL NAA+PROBE: NEGATIVE

## 2024-04-15 ENCOUNTER — TELEPHONE (OUTPATIENT)
Dept: HEMATOLOGY ONCOLOGY | Facility: CLINIC | Age: 59
End: 2024-04-15

## 2024-04-15 NOTE — TELEPHONE ENCOUNTER
I spoke with Hiral to schedule their consultation with Cancer Risk and Genetics.     Scheduling Outcome: Patient is scheduled for an appointment on 6/11/24 at 9am with Priscilla Gan    Personal/Family History Related to Appointment:     Personal History of Cancer: Patient reports no personal history of cancer    Family History of Cancer: Patient reports family history of mother-ovarian; father-colon; maternal grandmother-breast;    Is patient of [employer] Heritage?: No    History of Genetic Testing:  Personal History of Genetic Testing: Patient report no personal history of Genetic Testing.    Family History of Genetic Testing: Patient reports that no family members have had Genetic Testing.     Patient's preferred e-mail address: [email or web address]

## 2024-04-16 ENCOUNTER — HOSPITAL ENCOUNTER (OUTPATIENT)
Dept: RADIOLOGY | Facility: HOSPITAL | Age: 59
Discharge: HOME | End: 2024-04-16

## 2024-04-16 DIAGNOSIS — Z96.89 SPINAL CORD STIMULATOR STATUS: ICD-10-CM

## 2024-04-18 LAB
LAB AP GYN PRIMARY INTERPRETATION: NORMAL
Lab: NORMAL

## 2024-04-24 ENCOUNTER — HOSPITAL ENCOUNTER (OUTPATIENT)
Facility: AMBULARY SURGERY CENTER | Age: 59
Setting detail: OUTPATIENT SURGERY
Discharge: HOME | End: 2024-04-24
Attending: PHYSICAL MEDICINE & REHABILITATION | Admitting: PHYSICAL MEDICINE & REHABILITATION
Payer: COMMERCIAL

## 2024-04-24 ENCOUNTER — APPOINTMENT (OUTPATIENT)
Dept: RADIOLOGY | Facility: HOSPITAL | Age: 59
End: 2024-04-24
Payer: COMMERCIAL

## 2024-04-24 VITALS
HEART RATE: 72 BPM | DIASTOLIC BLOOD PRESSURE: 78 MMHG | SYSTOLIC BLOOD PRESSURE: 142 MMHG | TEMPERATURE: 97.3 F | RESPIRATION RATE: 16 BRPM | OXYGEN SATURATION: 98 %

## 2024-04-24 PROCEDURE — NC001 PR NO CHARGE: Performed by: PHYSICAL MEDICINE & REHABILITATION

## 2024-04-24 PROCEDURE — 77002 NEEDLE LOCALIZATION BY XRAY: CPT

## 2024-04-24 PROCEDURE — 77002 NEEDLE LOCALIZATION BY XRAY: CPT | Performed by: PHYSICAL MEDICINE & REHABILITATION

## 2024-04-24 PROCEDURE — 20610 DRAIN/INJ JOINT/BURSA W/O US: CPT | Performed by: PHYSICAL MEDICINE & REHABILITATION

## 2024-04-24 RX ORDER — ROPIVACAINE HYDROCHLORIDE 2 MG/ML
INJECTION, SOLUTION EPIDURAL; INFILTRATION; PERINEURAL AS NEEDED
Status: DISCONTINUED | OUTPATIENT
Start: 2024-04-24 | End: 2024-04-24 | Stop reason: HOSPADM

## 2024-04-24 RX ORDER — METHYLPREDNISOLONE ACETATE 80 MG/ML
INJECTION, SUSPENSION INTRA-ARTICULAR; INTRALESIONAL; INTRAMUSCULAR; SOFT TISSUE AS NEEDED
Status: DISCONTINUED | OUTPATIENT
Start: 2024-04-24 | End: 2024-04-24 | Stop reason: HOSPADM

## 2024-04-24 RX ORDER — LIDOCAINE HYDROCHLORIDE 10 MG/ML
INJECTION, SOLUTION EPIDURAL; INFILTRATION; INTRACAUDAL; PERINEURAL AS NEEDED
Status: DISCONTINUED | OUTPATIENT
Start: 2024-04-24 | End: 2024-04-24 | Stop reason: HOSPADM

## 2024-04-24 NOTE — OP NOTE
OPERATIVE REPORT  PATIENT NAME: Hiral Jaeger    :  1965  MRN: [id number]  Pt Location: [facility] MINOR/PAIN ROOM 01    SURGERY DATE: 2024    Surgeons and Role:     * Rafiq John DO - Primary    Preop Diagnosis:  Bursitis of right hip, unspecified bursa [M70.71]    Post-Op Diagnosis Codes:     * Bursitis of right hip, unspecified bursa [M70.71]    Procedure(s):  Right - RIGHT ISCHIAL BURSA INJECTION    Indication: Buttock pain  Preoperative diagnosis: Right-sided ischial bursitis  Postoperative diagnosis: Right-sided ischial bursitis  Procedure: Fluoroscopically-guided right ischial bursa injection  EBL: none  Specimens: not applicable    After discussing the risks, benefits, and alternatives to the procedure, the patient expressed understanding and wished to proceed. The patient was brought to the fluoroscopic suite and placed in the prone position. Procedural pause conducted to verify: correct patient identity, procedure to be performed, and as applicable, correct side and site, correct patient position, and availability of implants, special equipment and special requirements. Using fluoroscopy, an AP view was utilized to visualize the ischium. The skin was sterilely prepped and draped in the usual fashion using Chloraprep skin prep. The skin and subcutaneous tissues were anesthetized with 1% lidocaine. Using fluoroscopic guidance, a 3.5 inch 22 gauge spinal needle was advanced into the inferior ischial tuberosity After negative aspiration, Omnipaque 240 contrast was injected which confirmed intra-articular placement without evidence of intravascular uptake. A 4 ml solution consisting of 80 mg of Depo-Medrol in 3 mL of 0.2% ropivacaine was injected. The needle was withdrawn from the skin. The patient tolerated the procedure well, and there were no apparent complications. After appropriate observation, the patient was dismissed from the outpatient procedure area in good condition under their  own power.        SIGNATURE: Rafiq John, DO  DATE: April 24, 2024  TIME: 9:48 AM

## 2024-04-24 NOTE — INTERVAL H&P NOTE
H&P reviewed. After examining the patient I find no changes in the patients condition since the H&P had been written.    Vitals:    04/24/24 0830   BP: 160/86   Pulse: 77   Resp: 16   Temp: (!) 97.3 °F (36.3 °C)   SpO2: 98%

## 2024-04-24 NOTE — DISCHARGE INSTRUCTIONS

## 2024-05-01 ENCOUNTER — TELEPHONE (OUTPATIENT)
Dept: PAIN MEDICINE | Facility: CLINIC | Age: 59
End: 2024-05-01

## 2024-05-01 ENCOUNTER — HOSPITAL ENCOUNTER (OUTPATIENT)
Dept: RADIOLOGY | Facility: MEDICAL CENTER | Age: 59
Discharge: HOME | End: 2024-05-01
Payer: COMMERCIAL

## 2024-05-01 VITALS — BODY MASS INDEX: 37.76 KG/M2 | WEIGHT: 200 LBS | HEIGHT: 61 IN

## 2024-05-01 DIAGNOSIS — Z98.84 H/O GASTRIC BYPASS: ICD-10-CM

## 2024-05-01 DIAGNOSIS — Z12.31 ENCOUNTER FOR SCREENING MAMMOGRAM FOR BREAST CANCER: ICD-10-CM

## 2024-05-01 DIAGNOSIS — Z12.31 ENCOUNTER FOR SCREENING MAMMOGRAM FOR MALIGNANT NEOPLASM OF BREAST: ICD-10-CM

## 2024-05-01 DIAGNOSIS — E55.9 VITAMIN D DEFICIENCY: ICD-10-CM

## 2024-05-01 DIAGNOSIS — Z78.0 POST-MENOPAUSAL: ICD-10-CM

## 2024-05-01 DIAGNOSIS — Z13.820 SCREENING FOR OSTEOPOROSIS: ICD-10-CM

## 2024-05-01 PROCEDURE — 77067 SCR MAMMO BI INCL CAD: CPT

## 2024-05-01 PROCEDURE — 77063 BREAST TOMOSYNTHESIS BI: CPT

## 2024-05-01 PROCEDURE — 77080 DXA BONE DENSITY AXIAL: CPT

## 2024-05-01 NOTE — TELEPHONE ENCOUNTER
Pt reports 30% improvement post inj   Pain level 4/10  Pt aware I will call next week for an update

## 2024-05-02 ENCOUNTER — TELEPHONE (OUTPATIENT)
Dept: FAMILY MEDICINE CLINIC | Facility: CLINIC | Age: 59
End: 2024-05-02

## 2024-05-02 RX ORDER — MULTIVITAMIN WITH IRON
1 TABLET ORAL DAILY
Qty: 90 TABLET | Refills: 1 | Status: SHIPPED | OUTPATIENT
Start: 2024-05-02

## 2024-05-02 RX ORDER — ERGOCALCIFEROL 1.25 MG/1
50000 CAPSULE ORAL
Qty: 12 CAPSULE | Refills: 1 | Status: SHIPPED | OUTPATIENT
Start: 2024-05-02

## 2024-05-02 NOTE — TELEPHONE ENCOUNTER
----- Message from Carlos Palacios MD sent at 5/2/2024  3:07 PM EDT -----  Well this certainly does show osteoporosis and sadly we have seen a really big drop in the density of your bone especially in the lumbar spine  To see if I can get some Prolia for you this is a twice a year shot that can treat this  Is given here in the office

## 2024-05-14 ENCOUNTER — HOSPITAL ENCOUNTER (OUTPATIENT)
Dept: RADIOLOGY | Facility: HOSPITAL | Age: 59
Discharge: HOME | End: 2024-05-14
Payer: COMMERCIAL

## 2024-05-14 DIAGNOSIS — R93.89 ABNORMAL ULTRASOUND: ICD-10-CM

## 2024-05-14 DIAGNOSIS — N83.202 LEFT OVARIAN CYST: ICD-10-CM

## 2024-05-14 PROCEDURE — 72197 MRI PELVIS W/O & W/DYE: CPT

## 2024-05-14 PROCEDURE — A9585 GADOBUTROL INJECTION: HCPCS | Performed by: OBSTETRICS & GYNECOLOGY

## 2024-05-14 RX ORDER — GADOBUTROL 604.72 MG/ML
9 INJECTION INTRAVENOUS
Status: COMPLETED | OUTPATIENT
Start: 2024-05-14 | End: 2024-05-14

## 2024-05-14 RX ADMIN — GADOBUTROL 9 ML: 604.72 INJECTION INTRAVENOUS at 13:52

## 2024-05-21 ENCOUNTER — TELEPHONE (OUTPATIENT)
Dept: FAMILY MEDICINE CLINIC | Facility: CLINIC | Age: 59
End: 2024-05-21

## 2024-05-21 NOTE — TELEPHONE ENCOUNTER
----- Message from Fran COLON sent at 5/15/2024 10:06 AM EDT -----  Regarding: RE: Prolia  [employer] AUTH # [id number] FOR  PROLIA 60 MG/ML VALID 05/14/2024-12/31/2024 ESTEPHANIA PORTILLO MD (NPI [id number]).   DOCUMENTED IN EPIC REFERRAL IN CHART.  ----- Message -----  From: Michelle Perry MA  Sent: 5/8/2024   2:27 PM EDT  To: #  Subject: Prolia                                           Dr. Portillo would like patient to start on Prolia.  This would be a new medication for her.  She has had gastric bypass in the past and unable to take oral biphosphonates.

## 2024-05-24 ENCOUNTER — CLINICAL SUPPORT (OUTPATIENT)
Dept: FAMILY MEDICINE CLINIC | Facility: CLINIC | Age: 59
End: 2024-05-24
Payer: COMMERCIAL

## 2024-05-24 DIAGNOSIS — E55.9 VITAMIN D DEFICIENCY: Primary | ICD-10-CM

## 2024-05-24 DIAGNOSIS — M81.0 AGE RELATED OSTEOPOROSIS, UNSPECIFIED PATHOLOGICAL FRACTURE PRESENCE: ICD-10-CM

## 2024-05-24 PROCEDURE — 96372 THER/PROPH/DIAG INJ SC/IM: CPT

## 2024-05-24 NOTE — PROGRESS NOTES
Requested Prolia shot order.  I had no interaction with this patient and do not follow them in the office.  Filling for nursing staff.  Diagnosis was pulled from recent comment on her DEXA scan.

## 2024-05-31 DIAGNOSIS — N83.202 LEFT OVARIAN CYST: Primary | ICD-10-CM

## 2024-06-07 ENCOUNTER — DOCUMENTATION (OUTPATIENT)
Dept: GENETICS | Facility: CLINIC | Age: 59
End: 2024-06-07

## 2024-06-11 ENCOUNTER — DOCUMENTATION (OUTPATIENT)
Dept: GENETICS | Facility: CLINIC | Age: 59
End: 2024-06-11

## 2024-06-11 ENCOUNTER — TELEPHONE (OUTPATIENT)
Dept: HEMATOLOGY ONCOLOGY | Facility: CLINIC | Age: 59
End: 2024-06-11

## 2024-06-11 ENCOUNTER — CLINICAL SUPPORT (OUTPATIENT)
Dept: GENETICS | Facility: CLINIC | Age: 59
End: 2024-06-11

## 2024-06-11 DIAGNOSIS — Z80.41 FAMILY HISTORY OF OVARIAN CANCER: ICD-10-CM

## 2024-06-11 DIAGNOSIS — Z80.0 FAMILY HISTORY OF COLON CANCER: ICD-10-CM

## 2024-06-11 DIAGNOSIS — Z80.3 FAMILY HISTORY OF BREAST CANCER: Primary | ICD-10-CM

## 2024-06-11 DIAGNOSIS — Z80.42 FAMILY HISTORY OF PROSTATE CANCER: ICD-10-CM

## 2024-06-11 NOTE — LETTER
2024     Kurt Goodson MD  [address]  [address]  [facility] [address]    Patient: Hiral Jaeger  YOB: 1965  Date of Visit: 2024      Dear Dr. Goodson:    Thank you for referring Hiral Jaeger to me for evaluation. Below are my notes for this consultation.    If you have questions, please do not hesitate to call me. I look forward to following your patient along with you.         Sincerely,        Priscilla Gan        CC: Carlos Palacios MD        Pre-Test Genetic Counseling Consult Note    Patient Name: Hiral Jaeger   /Age: 1965/59 y.o.  Referring Provider: Kurt Goodson MD     Date of Service: 2024  Genetic Counselor: Priscilla Gan MS, [facility] [place]  Interpretation Services: None  Location: Telephone consult   Length of Visit: 30 Minutes    Hiral was referred to the [facility] Risk and Genetic Assessment Program due to her family history of breast and colon cancer . she presents today to discuss the possibility of a hereditary cancer syndrome, options for genetic testing, and implications for her and her family.         Cancer History and Treatment:   Personal History:   Oncology History    No history exists.       Screening Hx:   Breast:  Breast Imagin24  Breast Density: Almost entirely fatty    Colon:  Colonoscopy: 19      Gynecologic:  Ovaries and Uterus intact     Other screening: none    Reproductive History  Age at menarche: 17  Age at first live birth:  36  Menopause: Post Menopausal (na)  Hormone replacement: none    Medical and Surgical History  Pertinent surgical history:   Past Surgical History:   Procedure Laterality Date   • ACHILLES TENDON SURGERY Right 2022    Procedure: ACHILLES TENDON PARITAL TEAR REPAIR;  Surgeon: Ed Seaman DPM;  Location:  MAIN OR;  Service: Podiatry   • BUNIONECTOMY Right 2022    Procedure: HAGLUNDS REPAIR;  Surgeon: Ed Seaman DPM;  Location:  MAIN OR;  Service: Podiatry  "  • CARPAL TUNNEL RELEASE Bilateral     20 y.o. with left ulnar nerve release.     • CARPAL TUNNEL RELEASE Bilateral    •  SECTION     • COLONOSCOPY     • ELBOW SURGERY      Tennis elbow   • GASTRIC BYPASS     • [place] ARTHROCENTESIS ASPIR&/INJ MAJOR JT/BURSA W/O US Right 2024    Procedure: RIGHT ISCHIAL BURSA INJECTION;  Surgeon: Rafiq John DO;  Location: [facility] MAIN OR;  Service: Pain Management    • NC MILAN IMPLTJ NSTIM ELTRDS PLATE/PADDLE EDRL Left 08/15/2022    Procedure: Thoracic laminectomies for placement of spinal cord stimulator lead and internal pulse generator, left-sided pulse generator, with neuro monitoring;  Surgeon: Fransisco Faye MD;  Location:  MAIN OR;  Service: Neurosurgery   • REDUCTION MAMMAPLASTY Bilateral    • SPINAL CORD STIMULATOR IMPLANT     • WISDOM TOOTH EXTRACTION        Pertinent medical history:  Past Medical History:   Diagnosis Date   • Anemia     hx of iron   • Anxiety    • Cataract    • Chronic pain disorder     back radiates down legs   • Colon polyp    • COVID 2020   • CPAP (continuous positive airway pressure) dependence    • Fibromyalgia    • Fibromyalgia, primary    • Hypertension    • Lumbar radiculopathy    • Medical history unknown    • Osteopenia    • Ovarian cyst    • Partial tear of right Achilles tendon    • PONV (postoperative nausea and vomiting)    • Sleep apnea    • Sleep apnea, obstructive    • Spinal stenosis    • Staph infection     fingers years ago   • Use of cane as ambulatory aid          Other History:  Height:   Ht Readings from Last 1 Encounters:   24 5' 1\" (1.549 m)     Weight:   Wt Readings from Last 1 Encounters:   24 90.7 kg (200 lb)       Relevant Family History       Please refer to the scanned pedigree in the Media Tab for a complete family history     *All history is reported as provided by the patient; records are not available for review, except where indicated.     Assessment:  We discussed sporadic, " familial and hereditary cancer.  We also discussed the many factors that influence our risk for cancer such as age, environmental exposures, lifestyle choices and family history.      We reviewed the indications suggestive of a hereditary predisposition to cancer.    Hiral is unaffected, but is considering genetic testing due to a family history of breast cancer. Although Hiral's sister is the most informative person to undergo genetic testing, Hiral can consider genetic testing due to the following:   Patient does not have cancer but is the most informative person to test because their sister is .    Genetic testing is indicated for Hiral based on the following criteria: Meets NCCN V2.2024 Testing Criteria for High-Penetrance Breast Cancer Susceptibility Genes: family history of a first-degree relative diagnosed with breast cancer under 50  Meets NCCN V1.2023 Testing Criteria for the Evaluation of Deleon syndrome: family history of 3 or more second-degree relatives diagnosed with colon cancer    The risks, benefits, and limitations of genetic testing were reviewed with the patient, as well as genetic discrimination laws, and possible test results (positive, negative, variants of uncertain significance) and their clinical implications. If positive for a mutation, options for managing cancer risk including increased surveillance, chemoprevention, and in some cases prophylactic surgery were discussed. Hiral was informed that if a hereditary cancer syndrome was identified in her, first degree relatives (parents, siblings, and children) have a chance of also inheriting the condition. Genetic testing would allow for predictive genetic testing in other relatives, who may also be at risk depending on their degree of relation.     Billing:  Most individuals pay <$100 for hereditary cancer genetic testing. If insurance covers the cost of the testing, individuals may still pay out of pocket  secondary to co-pays, co-insurance, or deductibles. If the cost of the testing exceeds $100, the lab will reach out to the patient via phone or e-mail. The patient will then have the option to proceed with the testing, cancel the testing, or elect the self-pay option of $250. Hiral verbalized understanding.     Plan: Patient decided to proceed with testing and provided consent.    Summary:     Sample Collection:  A blood kit was mailed home to the patient    Genetic Testing Preformed: CustomNext: Cancer® +RNAinsight® (59 genes): APC, SHEILA, AXIN2, BAP1, BARD1, BMPR1A, BRCA1, BRCA2, BRIP1, CDH1, CDK4, CDKN1B, CDKN2A, CHEK2, CTNNA1, DICER1, EGLN1, EPCAM, FH, FLCN, GREM1, HOXB13, KIF1B, KIT, MAX, MEN1, MET, MITF, MLH1, MSH2, MSH3, MSH6, MUTYH, NF1, NTHL1, PALB2, PDGFRA PMS2, POLD1, POLE, POT1, PTEN, RAD51C, RAD51D, RB1, RET, SDHA, SDHAF2, SDHB, SDHC, SDHD, SMAD4, SMARCA4, STK11, RARZ050, TP53, TSC1, TSC2, VHL     Result Call Information:  In the event that we need to reach Hiral via telephone:  I confirmed the patient's mobile number on file as the best number to call with results  I confirmed with the patient that we can leave a voicemail on the provided numbers    Results take approximately 2-3 weeks to complete once test is started.    Hiral will be notified via [employer] once results are available.      Additional recommendations for surveillance/medical management will be made pending genetic test results.

## 2024-06-11 NOTE — PROGRESS NOTES
Pre-Test Genetic Counseling Consult Note    Patient Name: Hiral Jaeger   /Age: 1965/59 y.o.  Referring Provider: Kurt Goodson MD     Date of Service: 2024  Genetic Counselor: Priscilla Gan MS, [facility] [place]  Interpretation Services: None  Location: Telephone consult   Length of Visit: 30 Minutes    Hiral was referred to the [facility] Risk and Genetic Assessment Program due to her family history of breast and colon cancer . she presents today to discuss the possibility of a hereditary cancer syndrome, options for genetic testing, and implications for her and her family.         Cancer History and Treatment:   Personal History:   Oncology History    No history exists.       Screening Hx:   Breast:  Breast Imagin24  Breast Density: Almost entirely fatty    Colon:  Colonoscopy: 19      Gynecologic:  Ovaries and Uterus intact     Other screening: none    Reproductive History  Age at menarche: 17  Age at first live birth:  36  Menopause: Post Menopausal (na)  Hormone replacement: none    Medical and Surgical History  Pertinent surgical history:   Past Surgical History:   Procedure Laterality Date    ACHILLES TENDON SURGERY Right 2022    Procedure: ACHILLES TENDON PARITAL TEAR REPAIR;  Surgeon: Ed Seaman DPM;  Location:  MAIN OR;  Service: Podiatry    BUNIONECTOMY Right 2022    Procedure: HAGLUNDS REPAIR;  Surgeon: dE Seaman DPM;  Location:  MAIN OR;  Service: Podiatry    CARPAL TUNNEL RELEASE Bilateral     20 y.o. with left ulnar nerve release.      CARPAL TUNNEL RELEASE Bilateral      SECTION      COLONOSCOPY      ELBOW SURGERY      Tennis elbow    GASTRIC BYPASS      CA ARTHROCENTESIS ASPIR&/INJ MAJOR JT/BURSA W/O US Right 2024    Procedure: RIGHT ISCHIAL BURSA INJECTION;  Surgeon: Rafiq John DO;  Location: [facility] MAIN OR;  Service: Pain Management     CA MILAN IMPLTJ NSTIM ELTRDS PLATE/PADDLE EDRL Left 08/15/2022    Procedure:  "Thoracic laminectomies for placement of spinal cord stimulator lead and internal pulse generator, left-sided pulse generator, with neuro monitoring;  Surgeon: Fransisco Faye MD;  Location:  MAIN OR;  Service: Neurosurgery    REDUCTION MAMMAPLASTY Bilateral     SPINAL CORD STIMULATOR IMPLANT  2021    WISDOM TOOTH EXTRACTION        Pertinent medical history:  Past Medical History:   Diagnosis Date    Anemia     hx of iron    Anxiety     Cataract     Chronic pain disorder     back radiates down legs    Colon polyp     COVID 03/2020    CPAP (continuous positive airway pressure) dependence     Fibromyalgia     Fibromyalgia, primary     Hypertension     Lumbar radiculopathy     Medical history unknown     Osteopenia     Ovarian cyst     Partial tear of right Achilles tendon     PONV (postoperative nausea and vomiting)     Sleep apnea     Sleep apnea, obstructive     Spinal stenosis     Staph infection     fingers years ago    Use of cane as ambulatory aid          Other History:  Height:   Ht Readings from Last 1 Encounters:   05/01/24 5' 1\" (1.549 m)     Weight:   Wt Readings from Last 1 Encounters:   05/01/24 90.7 kg (200 lb)       Relevant Family History       Please refer to the scanned pedigree in the Media Tab for a complete family history     *All history is reported as provided by the patient; records are not available for review, except where indicated.     Assessment:  We discussed sporadic, familial and hereditary cancer.  We also discussed the many factors that influence our risk for cancer such as age, environmental exposures, lifestyle choices and family history.      We reviewed the indications suggestive of a hereditary predisposition to cancer.    iHral is unaffected, but is considering genetic testing due to a family history of breast cancer. Although Hiral's sister is the most informative person to undergo genetic testing, Hiral can consider genetic testing due to the " following:   Patient does not have cancer but is the most informative person to test because their sister is .    Genetic testing is indicated for Hiral based on the following criteria: Meets NCCN V2.2024 Testing Criteria for High-Penetrance Breast Cancer Susceptibility Genes: family history of a first-degree relative diagnosed with breast cancer under 50  Meets NCCN V1.2023 Testing Criteria for the Evaluation of Deleon syndrome: family history of 3 or more second-degree relatives diagnosed with colon cancer    The risks, benefits, and limitations of genetic testing were reviewed with the patient, as well as genetic discrimination laws, and possible test results (positive, negative, variants of uncertain significance) and their clinical implications. If positive for a mutation, options for managing cancer risk including increased surveillance, chemoprevention, and in some cases prophylactic surgery were discussed. Hiral was informed that if a hereditary cancer syndrome was identified in her, first degree relatives (parents, siblings, and children) have a chance of also inheriting the condition. Genetic testing would allow for predictive genetic testing in other relatives, who may also be at risk depending on their degree of relation.     Billing:  Most individuals pay <$100 for hereditary cancer genetic testing. If insurance covers the cost of the testing, individuals may still pay out of pocket secondary to co-pays, co-insurance, or deductibles. If the cost of the testing exceeds $100, the lab will reach out to the patient via phone or e-mail. The patient will then have the option to proceed with the testing, cancel the testing, or elect the self-pay option of $250. Hiral verbalized understanding.     Plan: Patient decided to proceed with testing and provided consent.    Summary:     Sample Collection:  A blood kit was mailed home to the patient    Genetic Testing Preformed: CustomNext: Cancer®  +[employer]ice[employer]® (59 genes): APC, SHEILA, AXIN2, BAP1, BARD1, BMPR1A, BRCA1, BRCA2, BRIP1, CDH1, CDK4, CDKN1B, CDKN2A, CHEK2, CTNNA1, DICER1, EGLN1, EPCAM, FH, FLCN, GREM1, HOXB13, KIF1B, KIT, MAX, MEN1, MET, MITF, MLH1, MSH2, MSH3, MSH6, MUTYH, NF1, NTHL1, PALB2, PDGFRA PMS2, POLD1, POLE, POT1, PTEN, RAD51C, RAD51D, RB1, RET, SDHA, SDHAF2, SDHB, SDHC, SDHD, SMAD4, SMARCA4, STK11, MSAR241, TP53, TSC1, TSC2, VHL     Result Call Information:  In the event that we need to reach Hiral via telephone:  I confirmed the patient's mobile number on file as the best number to call with results  I confirmed with the patient that we can leave a voicemail on the provided numbers    Results take approximately 2-3 weeks to complete once test is started.    Hiral will be notified via [employer] once results are available.      Additional recommendations for surveillance/medical management will be made pending genetic test results.

## 2024-06-11 NOTE — TELEPHONE ENCOUNTER
Patient calling to confirm virtual appt with Priscilla Gan today at 9am.  Please call [phone]

## 2024-07-08 ENCOUNTER — TELEPHONE (OUTPATIENT)
Age: 59
End: 2024-07-08

## 2024-07-08 NOTE — TELEPHONE ENCOUNTER
Patient calling.    Tests were to be ordered but the patient is not certain of how the tests would be performed or how they would be sent to the patient.    Returning call from Halie KEN    HIPAA info confirmed.    Please call [phone]

## 2024-07-10 ENCOUNTER — APPOINTMENT (OUTPATIENT)
Dept: LAB | Facility: HOSPITAL | Age: 59
End: 2024-07-10
Payer: COMMERCIAL

## 2024-07-10 DIAGNOSIS — E83.42 HYPOMAGNESEMIA: ICD-10-CM

## 2024-07-10 DIAGNOSIS — Z79.899 OTHER LONG TERM (CURRENT) DRUG THERAPY: ICD-10-CM

## 2024-07-10 DIAGNOSIS — Z80.42 FAMILY HISTORY OF MALIGNANT NEOPLASM OF PROSTATE: ICD-10-CM

## 2024-07-10 DIAGNOSIS — Z80.0 FAMILY HISTORY OF MALIGNANT NEOPLASM OF GASTROINTESTINAL TRACT: ICD-10-CM

## 2024-07-10 DIAGNOSIS — Z98.84 H/O GASTRIC BYPASS: ICD-10-CM

## 2024-07-10 DIAGNOSIS — E61.1 IRON DEFICIENCY: ICD-10-CM

## 2024-07-10 DIAGNOSIS — Z80.41 FAMILY HISTORY OF MALIGNANT NEOPLASM OF OVARY: ICD-10-CM

## 2024-07-10 DIAGNOSIS — Z80.3 FAMILY HISTORY OF MALIGNANT NEOPLASM OF BREAST: ICD-10-CM

## 2024-07-10 LAB
25(OH)D3 SERPL-MCNC: 23.3 NG/ML (ref 30–100)
ALBUMIN SERPL BCG-MCNC: 4.1 G/DL (ref 3.5–5)
ALP SERPL-CCNC: 56 U/L (ref 34–104)
ALT SERPL W P-5'-P-CCNC: 18 U/L (ref 7–52)
ANION GAP SERPL CALCULATED.3IONS-SCNC: 9 MMOL/L (ref 4–13)
AST SERPL W P-5'-P-CCNC: 25 U/L (ref 13–39)
BASOPHILS # BLD AUTO: 0.04 THOUSANDS/ÂΜL (ref 0–0.1)
BASOPHILS NFR BLD AUTO: 1 % (ref 0–1)
BILIRUB SERPL-MCNC: 0.37 MG/DL (ref 0.2–1)
BUN SERPL-MCNC: 15 MG/DL (ref 5–25)
CALCIUM SERPL-MCNC: 8.8 MG/DL (ref 8.4–10.2)
CHLORIDE SERPL-SCNC: 105 MMOL/L (ref 96–108)
CHOLEST SERPL-MCNC: 174 MG/DL
CO2 SERPL-SCNC: 24 MMOL/L (ref 21–32)
CREAT SERPL-MCNC: 0.78 MG/DL (ref 0.6–1.3)
EOSINOPHIL # BLD AUTO: 0.08 THOUSAND/ÂΜL (ref 0–0.61)
EOSINOPHIL NFR BLD AUTO: 1 % (ref 0–6)
ERYTHROCYTE [DISTWIDTH] IN BLOOD BY AUTOMATED COUNT: 11.6 % (ref 11.6–15.1)
EST. AVERAGE GLUCOSE BLD GHB EST-MCNC: 120 MG/DL
FERRITIN SERPL-MCNC: 32 NG/ML (ref 11–307)
GFR SERPL CREATININE-BSD FRML MDRD: 83 ML/MIN/1.73SQ M
GLUCOSE SERPL-MCNC: 223 MG/DL (ref 65–140)
HBA1C MFR BLD: 5.8 %
HCT VFR BLD AUTO: 40 % (ref 34.8–46.1)
HDLC SERPL-MCNC: 67 MG/DL
HGB BLD-MCNC: 13.4 G/DL (ref 11.5–15.4)
IMM GRANULOCYTES # BLD AUTO: 0.03 THOUSAND/UL (ref 0–0.2)
IMM GRANULOCYTES NFR BLD AUTO: 0 % (ref 0–2)
IRON SATN MFR SERPL: 24 % (ref 15–50)
IRON SERPL-MCNC: 87 UG/DL (ref 50–212)
LDLC SERPL CALC-MCNC: 80 MG/DL (ref 0–100)
LYMPHOCYTES # BLD AUTO: 1.52 THOUSANDS/ÂΜL (ref 0.6–4.47)
LYMPHOCYTES NFR BLD AUTO: 21 % (ref 14–44)
MAGNESIUM SERPL-MCNC: 2.1 MG/DL (ref 1.9–2.7)
MCH RBC QN AUTO: 31.7 PG (ref 26.8–34.3)
MCHC RBC AUTO-ENTMCNC: 33.5 G/DL (ref 31.4–37.4)
MCV RBC AUTO: 95 FL (ref 82–98)
MONOCYTES # BLD AUTO: 0.38 THOUSAND/ÂΜL (ref 0.17–1.22)
MONOCYTES NFR BLD AUTO: 5 % (ref 4–12)
NEUTROPHILS # BLD AUTO: 5.08 THOUSANDS/ÂΜL (ref 1.85–7.62)
NEUTS SEG NFR BLD AUTO: 72 % (ref 43–75)
NRBC BLD AUTO-RTO: 0 /100 WBCS
PLATELET # BLD AUTO: 228 THOUSANDS/UL (ref 149–390)
PMV BLD AUTO: 10.6 FL (ref 8.9–12.7)
POTASSIUM SERPL-SCNC: 3.9 MMOL/L (ref 3.5–5.3)
PROT SERPL-MCNC: 6.9 G/DL (ref 6.4–8.4)
RBC # BLD AUTO: 4.23 MILLION/UL (ref 3.81–5.12)
SODIUM SERPL-SCNC: 138 MMOL/L (ref 135–147)
TIBC SERPL-MCNC: 366 UG/DL (ref 250–450)
TRIGL SERPL-MCNC: 135 MG/DL
TSH SERPL DL<=0.05 MIU/L-ACNC: 2.03 UIU/ML (ref 0.45–4.5)
UIBC SERPL-MCNC: 279 UG/DL (ref 155–355)
VIT B12 SERPL-MCNC: 271 PG/ML (ref 180–914)
WBC # BLD AUTO: 7.13 THOUSAND/UL (ref 4.31–10.16)

## 2024-07-10 PROCEDURE — 83550 IRON BINDING TEST: CPT

## 2024-07-10 PROCEDURE — 82728 ASSAY OF FERRITIN: CPT

## 2024-07-10 PROCEDURE — 83036 HEMOGLOBIN GLYCOSYLATED A1C: CPT

## 2024-07-10 PROCEDURE — 83540 ASSAY OF IRON: CPT

## 2024-07-10 PROCEDURE — 83735 ASSAY OF MAGNESIUM: CPT

## 2024-07-11 ENCOUNTER — TELEPHONE (OUTPATIENT)
Dept: FAMILY MEDICINE CLINIC | Facility: CLINIC | Age: 59
End: 2024-07-11

## 2024-07-11 NOTE — TELEPHONE ENCOUNTER
----- Message from Carlos Palacios MD sent at 7/10/2024 10:03 PM EDT -----  You are a PreDiabetic: watch the white rice, white bread, white Potato, and pasta. Maybe try the brown /whole wheat versions, or just limit how much and how often. Also be careful of the juices and sodas, and of course the sweets.     Maybe take iron daily   Your ferritin is dropping slowly though - like over 2 yrs

## 2024-07-29 ENCOUNTER — NURSE TRIAGE (OUTPATIENT)
Age: 59
End: 2024-07-29

## 2024-07-29 NOTE — TELEPHONE ENCOUNTER
"Incoming call from patient. Patient is having urinary urgency and incontinence. Started about a month ago. Is having to get up during the night 2-3 times to urinate. Denies pain with urination, back pain, fever/chills, other UTI symptoms.     UTI symptoms reviewed and patient will contact office if symptoms occur. Appointment scheduled for evaluation 8/8.     Reason for Disposition   Can't control passage of urine (i.e., urinary incontinence, wetting self) and present > 2 weeks    Answer Assessment - Initial Assessment Questions  1. SYMPTOM: \"What's the main symptom you're concerned about?\" (e.g., frequency, incontinence)      Urinary urgency, waking up during night to urinate. 2-3 times a night  2. ONSET: \"When did the  symptoms  start?\"      Started getting worse about 1 month ago  3. PAIN: \"Is there any pain?\" If Yes, ask: \"How bad is it?\" (Scale: 1-10; mild, moderate, severe)      Denies  4. CAUSE: \"What do you think is causing the symptoms?\"      Unknown   5. OTHER SYMPTOMS: \"Do you have any other symptoms?\" (e.g., fever, flank pain, blood in urine, pain with urination)      Denies    Protocols used: Urinary Symptoms-ADULT-[place]    "

## 2024-08-02 DIAGNOSIS — E53.8 B12 DEFICIENCY: ICD-10-CM

## 2024-08-02 DIAGNOSIS — G57.01 PIRIFORMIS SYNDROME OF RIGHT SIDE: ICD-10-CM

## 2024-08-02 RX ORDER — BACLOFEN 20 MG/1
20 TABLET ORAL 2 TIMES DAILY PRN
Qty: 30 TABLET | Refills: 0 | Status: SHIPPED | OUTPATIENT
Start: 2024-08-02

## 2024-08-02 NOTE — TELEPHONE ENCOUNTER
Patient has not had a refill for 9 months.  I will send a 30-day supply over.  This should hold him to his PCP comes back even if he is taking it daily.  I am covering for primary care provider.

## 2024-08-08 ENCOUNTER — OFFICE VISIT (OUTPATIENT)
Dept: OBGYN CLINIC | Facility: CLINIC | Age: 59
End: 2024-08-08
Payer: COMMERCIAL

## 2024-08-08 VITALS
BODY MASS INDEX: 37.79 KG/M2 | SYSTOLIC BLOOD PRESSURE: 145 MMHG | DIASTOLIC BLOOD PRESSURE: 90 MMHG | RESPIRATION RATE: 18 BRPM | HEIGHT: 61 IN

## 2024-08-08 DIAGNOSIS — N39.3 STRESS INCONTINENCE OF URINE: Primary | ICD-10-CM

## 2024-08-08 DIAGNOSIS — N36.42 INTRINSIC SPHINCTER DEFICIENCY (ISD): ICD-10-CM

## 2024-08-08 PROCEDURE — 99213 OFFICE O/P EST LOW 20 MIN: CPT | Performed by: OBSTETRICS & GYNECOLOGY

## 2024-08-08 PROCEDURE — 87086 URINE CULTURE/COLONY COUNT: CPT | Performed by: OBSTETRICS & GYNECOLOGY

## 2024-08-08 RX ORDER — UREA 10 %
500 LOTION (ML) TOPICAL DAILY
Qty: 30 TABLET | Refills: 0 | Status: SHIPPED | OUTPATIENT
Start: 2024-08-08

## 2024-08-08 NOTE — PROGRESS NOTES
"Assessment/Plan:     There are no diagnoses linked to this encounter.             58 yo female   Left ovarian cyst   Prior   Post menopause  Currently not sexually active  Family history ovarian cancer variant of uncertain significance  Prior gastric bypass  Prior multiple orthopedic surgery  History of breast reduction  FLOWER/ISD  Plan   Urine culture  Kegel exercises recommended  Return to office in 1 to 2 months if symptoms did not improve desires surgical management  Diet/exercise  Ca/VIT D  MAMMO  DEXA  Ovarian cyst recommend pelvic U/S in 1y  Rto for annual exam    All questions answered.        Subjective:      Patient ID: Hiral Jaeger is a 59 y.o. female.    HPI  59-year-old female presented to the office today secondary to urinary incontinence  Patient started leaking urine when she coughs sneeze patient is not able to hold her urine when she has a full bladder  Patient said symptom is worsening and affecting her quality of life  Denies any urgency or frequency  Denies any dysuria  Denies any pelvic pain  Patient said symptoms started 2 months ago and getting worse  Denies any vaginal itching odor or discharge  The following portions of the patient's history were reviewed and updated as appropriate: allergies, current medications, past family history, past medical history, past social history, past surgical history and problem list.    Review of Systems      Objective:      /90 (BP Location: Right arm, Patient Position: Sitting, Cuff Size: Adult)   Resp 18   Ht 5' 1\" (1.549 m)   BMI 37.79 kg/m²          Physical Exam  Constitutional:       Appearance: She is well-developed.   Abdominal:      General: There is no distension.      Palpations: Abdomen is soft.      Tenderness: There is no abdominal tenderness.   Genitourinary:     Labia:         Right: No rash, tenderness or lesion.         Left: No rash, tenderness or lesion.       Comments: Q-tip test performed today less than 30 " degree  Positive cough test  Neurological:      Mental Status: She is alert and oriented to person, place, and time.   Psychiatric:         Behavior: Behavior normal.

## 2024-08-10 LAB — BACTERIA UR CULT: NORMAL

## 2024-08-13 DIAGNOSIS — G57.01 PIRIFORMIS SYNDROME OF RIGHT SIDE: ICD-10-CM

## 2024-08-16 RX ORDER — BACLOFEN 20 MG/1
TABLET ORAL
Qty: 180 TABLET | Refills: 1 | Status: SHIPPED | OUTPATIENT
Start: 2024-08-16

## 2024-08-20 ENCOUNTER — RA CDI HCC (OUTPATIENT)
Dept: OTHER | Facility: HOSPITAL | Age: 59
End: 2024-08-20

## 2024-08-20 DIAGNOSIS — E66.01 CLASS 2 SEVERE OBESITY DUE TO EXCESS CALORIES WITH SERIOUS COMORBIDITY AND BODY MASS INDEX (BMI) OF 37.0 TO 37.9 IN ADULT (HCC): Primary | ICD-10-CM

## 2024-08-20 PROBLEM — E66.812 CLASS 2 SEVERE OBESITY DUE TO EXCESS CALORIES WITH SERIOUS COMORBIDITY AND BODY MASS INDEX (BMI) OF 36.0 TO 36.9 IN ADULT (HCC): Status: RESOLVED | Noted: 2023-01-10 | Resolved: 2024-08-20

## 2024-08-20 PROBLEM — E66.9 OBESITY, CLASS II, BMI 35-39.9: Status: RESOLVED | Noted: 2021-05-18 | Resolved: 2024-08-20

## 2024-08-20 PROBLEM — E66.812 CLASS 2 SEVERE OBESITY DUE TO EXCESS CALORIES WITH SERIOUS COMORBIDITY AND BODY MASS INDEX (BMI) OF 37.0 TO 37.9 IN ADULT (HCC): Status: ACTIVE | Noted: 2024-08-20

## 2024-08-20 PROBLEM — Z48.815 ENCOUNTER FOR SURGICAL AFTERCARE FOLLOWING SURGERY OF DIGESTIVE SYSTEM: Status: RESOLVED | Noted: 2021-05-18 | Resolved: 2024-08-20

## 2024-08-20 PROBLEM — E66.812 OBESITY, CLASS II, BMI 35-39.9: Status: RESOLVED | Noted: 2021-05-18 | Resolved: 2024-08-20

## 2024-09-10 DIAGNOSIS — E53.8 B12 DEFICIENCY: ICD-10-CM

## 2024-09-11 RX ORDER — UREA 10 %
500 LOTION (ML) TOPICAL DAILY
Qty: 90 TABLET | Refills: 1 | Status: SHIPPED | OUTPATIENT
Start: 2024-09-11

## 2024-09-20 ENCOUNTER — TELEPHONE (OUTPATIENT)
Age: 59
End: 2024-09-20

## 2024-09-20 NOTE — TELEPHONE ENCOUNTER
Caller: stella Blackman    Doctor: Alvaro    Reason for call: pt calling for a repeat hip injection    Call back#: [phone]

## 2024-09-26 NOTE — TELEPHONE ENCOUNTER
Okay to schedule repeat right ischial bursa steroid injection under fluoroscopy guidance  Thank you

## 2024-09-26 NOTE — TELEPHONE ENCOUNTER
Injection type: Rt ischial Bursa   Last injection date: 4/24/24 KW  Last office visit: 4/5/24 KW  Where is pain located: Rt hip   Is the pain the same area as before: Yes  Current pain level: 7/10 constant deep ache/soreness  How much % of relief did the last injection provide: 70%+  Duration of relief from last injection: 4mo  When did pain return: 3wks ago  Is the pain effecting your ADLs: yes, diff ambul, standing    Blood thinners/NSAIDS? N/A  Diabetes? No                   CGM? No    Pls advise on repeat Rt hip inj. Thank you!

## 2024-10-14 LAB — HCV AB SER-ACNC: NORMAL

## 2024-10-14 NOTE — LETTER
10/14/2024    Dear Hiral,    There was recently an update to your genetic test results from your genetic testing done on 7/10/2024 through the [facility] Genetics [facility]. Below is a summary of this update and a copy of your original and updated reports are attached. If you have any questions regarding your previous testing or the updates to your report, please contact our office.    Sincerely,     Cancer Risk and Genetics Program   [address], [address]  [place] [address]  Phone: [phone]   Fax: [phone]        Gene Variant Reclassification Update    SUMMARY:    Original Report Date: 7/19/2024  Provider Seen: Priscilla Gan MS, [facility]  Test: BRCA1/2 and Deleon Syndrome Analyses with [employer]® +RNAinsAdbongo®  Original Result: DICER1 Variant, Unknown Significance: [id number]  SHEILA Variant, Unknown Significance: [id number]    Reclassification Date: 10/11/2024  Reclassified Variant: SHEILA c.5089A>G (p.[id number])  New Classification: Variant, Likely Benign     Assessment:    A reclassification of a variant occurs when the lab gathers enough evidence on a genetic variant to determine that it is likely pathogenic (disease-causing), or likely benign (not associated with disease). When a reclassification is made, the lab issues a new genetic test report, and notifies your clinical team.    Your prior testing showed a variant of uncertain significance in the SHEILA gene (c.5089A>G (p.[id number])). Since the time of your testing the lab has accumulated enough information to say that this variant is unlikely to be associated with an increased risk for cancer.    A negative result significantly reduces the likelihood that there is a hereditary cancer syndrome in your family. However, this testing is unable to completely rule out the presence of hereditary cancer. It remains possible that:  There is a variant in an area of a gene which was not tested or there is a variant not  detectable due to technical limitations of this this test.     There is a variant in another gene that was not included in this test or in a gene not known to be linked to cancer or tumors.   A family member has a genetic variant that the patient did not inherit.   The cancer in the family is sporadic and is related to non-hereditary factors.     On your original test report there was an additional variant found.The new report only includes the variant that has been reclassified. It's important to refer back to the original report for complete information regarding your test results. If you have any questions regarding your original test results or would like to obtain an original copy, please call our genetics office at [phone] to speak with a genetic counselor.    Plan:   As this variant has been downgraded to a negative result, there are no additional recommendations at this time for cancer screening. We recommend that you continue to follow cancer screening as recommended by your healthcare provider.     Updated Negative Result: We encourage you to contact us regarding any changes in your personal or family history of cancer as these changes could alter our recommendation regarding genetic testing and/or cancer screening.

## 2024-10-18 ENCOUNTER — PREP FOR PROCEDURE (OUTPATIENT)
Dept: GASTROENTEROLOGY | Facility: AMBULARY SURGERY CENTER | Age: 59
End: 2024-10-18

## 2024-10-18 DIAGNOSIS — Z80.0 FAMILY HISTORY OF COLORECTAL CANCER: ICD-10-CM

## 2024-10-18 DIAGNOSIS — E61.1 IRON DEFICIENCY: ICD-10-CM

## 2024-10-18 DIAGNOSIS — K63.5 POLYP OF COLON, UNSPECIFIED PART OF COLON, UNSPECIFIED TYPE: Primary | ICD-10-CM

## 2024-10-21 ENCOUNTER — HOSPITAL ENCOUNTER (OUTPATIENT)
Dept: RADIOLOGY | Facility: CLINIC | Age: 59
Discharge: HOME | End: 2024-10-21
Payer: COMMERCIAL

## 2024-10-21 VITALS
RESPIRATION RATE: 18 BRPM | DIASTOLIC BLOOD PRESSURE: 90 MMHG | SYSTOLIC BLOOD PRESSURE: 150 MMHG | OXYGEN SATURATION: 98 % | TEMPERATURE: 97.6 F | HEART RATE: 74 BPM

## 2024-10-21 DIAGNOSIS — M70.71 ISCHIAL BURSITIS, RIGHT: ICD-10-CM

## 2024-10-21 PROCEDURE — 20610 DRAIN/INJ JOINT/BURSA W/O US: CPT | Performed by: PHYSICAL MEDICINE & REHABILITATION

## 2024-10-21 PROCEDURE — 77002 NEEDLE LOCALIZATION BY XRAY: CPT

## 2024-10-21 PROCEDURE — 77002 NEEDLE LOCALIZATION BY XRAY: CPT | Performed by: PHYSICAL MEDICINE & REHABILITATION

## 2024-10-21 RX ORDER — LIDOCAINE HYDROCHLORIDE 10 MG/ML
2 INJECTION, SOLUTION EPIDURAL; INFILTRATION; INTRACAUDAL; PERINEURAL ONCE
Status: COMPLETED | OUTPATIENT
Start: 2024-10-21 | End: 2024-10-21

## 2024-10-21 RX ORDER — METHYLPREDNISOLONE ACETATE 80 MG/ML
80 INJECTION, SUSPENSION INTRA-ARTICULAR; INTRALESIONAL; INTRAMUSCULAR; PARENTERAL; SOFT TISSUE ONCE
Status: COMPLETED | OUTPATIENT
Start: 2024-10-21 | End: 2024-10-21

## 2024-10-21 RX ORDER — ROPIVACAINE HYDROCHLORIDE 2 MG/ML
3 INJECTION, SOLUTION EPIDURAL; INFILTRATION; PERINEURAL ONCE
Status: COMPLETED | OUTPATIENT
Start: 2024-10-21 | End: 2024-10-21

## 2024-10-21 RX ADMIN — METHYLPREDNISOLONE ACETATE 80 MG: 80 INJECTION, SUSPENSION INTRA-ARTICULAR; INTRALESIONAL; INTRAMUSCULAR; SOFT TISSUE at 09:19

## 2024-10-21 RX ADMIN — ROPIVACAINE HYDROCHLORIDE 3 ML: 2 INJECTION, SOLUTION EPIDURAL; INFILTRATION at 09:19

## 2024-10-21 RX ADMIN — IOHEXOL 1 ML: 300 INJECTION, SOLUTION INTRAVENOUS at 09:19

## 2024-10-21 RX ADMIN — LIDOCAINE HYDROCHLORIDE 2 ML: 10 INJECTION, SOLUTION EPIDURAL; INFILTRATION; INTRACAUDAL; PERINEURAL at 09:18

## 2024-10-21 NOTE — H&P
History of Present Illness: The patient is a 59 y.o. female who presents with complaints of right buttock pain    Past Medical History:   Diagnosis Date    Anemia     hx of iron    Anxiety     Cataract     Chronic pain disorder     back radiates down legs    Colon polyp     COVID 2020    CPAP (continuous positive airway pressure) dependence     Depression     It has been for a long time    Fibromyalgia     Fibromyalgia, primary     Heart valve disease     Hypertension     Lumbar radiculopathy     Medical history unknown     Osteopenia     Ovarian cyst     Partial tear of right Achilles tendon     PONV (postoperative nausea and vomiting)     Sickle cell anemia (HCC)     Sleep apnea     Sleep apnea, obstructive     Spinal stenosis     Staph infection     fingers years ago    Tuberculosis     Use of cane as ambulatory aid     Varicella        Past Surgical History:   Procedure Laterality Date    ACHILLES TENDON SURGERY Right 2022    Procedure: ACHILLES TENDON PARITAL TEAR REPAIR;  Surgeon: Ed Seaman DPM;  Location:  MAIN OR;  Service: Podiatry    BARIATRIC SURGERY  Not sure    BUNIONECTOMY Right 2022    Procedure: HAGLUNDS REPAIR;  Surgeon: Ed Seaman DPM;  Location:  MAIN OR;  Service: Podiatry    CARPAL TUNNEL RELEASE Bilateral     20 y.o. with left ulnar nerve release.      CARPAL TUNNEL RELEASE Bilateral      SECTION      COLONOSCOPY      ELBOW SURGERY      Tennis elbow    GASTRIC BYPASS      OK ARTHROCENTESIS ASPIR&/INJ MAJOR JT/BURSA W/O US Right 2024    Procedure: RIGHT ISCHIAL BURSA INJECTION;  Surgeon: Rafiq John DO;  Location: [facility] MAIN OR;  Service: Pain Management     OK MILAN IMPLTJ NSTIM ELTRDS PLATE/PADDLE EDRL Left 08/15/2022    Procedure: Thoracic laminectomies for placement of spinal cord stimulator lead and internal pulse generator, left-sided pulse generator, with neuro monitoring;  Surgeon: Fransisco Faye MD;  Location:  MAIN OR;  Service:  "Neurosurgery    REDUCTION MAMMAPLASTY Bilateral     SPINAL CORD STIMULATOR IMPLANT  2021    WISDOM TOOTH EXTRACTION           Current Outpatient Medications:     B-D TB SYRINGE 1CC/27GX1/2\" 27G X 1/2\" 1 ML MISC, ADMINISTER B12 INJECTION WITH SYRINGE ONCE A WEEK TIMES 4 WEEKS THEN MONTHLY AS DIRECTED., Disp: , Rfl:     baclofen 20 mg tablet, TAKE 1 TABLET (20 MG TOTAL) BY MOUTH TWICE A DAY AS NEEDED FOR MUSCLE SPASM, Disp: 180 tablet, Rfl: 1    cyanocobalamin 1,000 mcg/mL, , Disp: , Rfl:     ergocalciferol (VITAMIN D2) 50,000 units, Take 1 capsule (50,000 Units total) by mouth every 28 days, Disp: 12 capsule, Rfl: 1    escitalopram (LEXAPRO) 10 mg tablet, Take 1 tablet (10 mg total) by mouth daily, Disp: 90 tablet, Rfl: 1    linaCLOtide 145 MCG CAPS, Take 1 capsule (145 mcg total) by mouth daily at least 30 minutes prior to the first meal of the day, Disp: 90 capsule, Rfl: 3    Magnesium 250 MG TABS, Take 1 tablet (250 mg total) by mouth in the morning, Disp: 90 tablet, Rfl: 1    Syringe/Needle, Disp, 27G X 1/2\" 1 ML MISC, Administer B12 injection with syringe once a week times 4 weeks then monthly as directed., Disp: , Rfl:     vitamin B-12 (VITAMIN B-12) 500 mcg tablet, TAKE 1 TABLET BY MOUTH DAILY, Disp: 90 tablet, Rfl: 1    Current Facility-Administered Medications:     iohexol (OMNIPAQUE) 300 mg/mL injection 1 mL, 1 mL, Intra-articular, Once, Rafiq John, DO    lidocaine (PF) (XYLOCAINE-MPF) 1 % injection 2 mL, 2 mL, Infiltration, Once, Rafiq John DO    methylPREDNISolone acetate (DEPO-MEDROL) injection 80 mg, 80 mg, Intra-articular, Once, Rafiq John DO    ropivacaine (NAROPIN) injection 3 mL, 3 mL, Intra-articular, Once, Rafiq John, DO    No Known Allergies    Physical Exam:   Vitals:    10/21/24 0901   BP: 143/84   Pulse: 80   Resp: 18   Temp: 97.6 °F (36.4 °C)   SpO2: 98%     General: Awake, Alert, Oriented x 3, Mood and affect appropriate  Respiratory: Respirations even and " unlabored  Cardiovascular: Peripheral pulses intact; no edema  Musculoskeletal Exam: Tenderness palpation right glutes    ASA Score: 2    Patient/Chart Verification  Patient ID Verified: Verbal  ID Band Applied: No  Consents Confirmed: Procedural, To be obtained in the Pre-Procedure area  H&P( within 30 days) Verified: To be obtained in the Procedural area  Allergies Reviewed: Yes  Anticoag/NSAID held?: No  Currently on antibiotics?: No    Assessment:   1. Ischial bursitis, right        Plan: right ischial bursa steroid injection

## 2024-10-21 NOTE — DISCHARGE INSTR - LAB
Do not apply heat to any area that is numb. If you have discomfort or soreness at the injection site, you may apply ice today, 20 minutes on and 20 minutes off. Tomorrow you may use ice or warm, moist heat. Do not apply ice or heat directly to the skin.  If you experience severe shortness of breath, go to the Emergency Room.  You may have numbness for several hours from the local anesthetic. Please use caution and common sense, especially with weight-bearing activities.  You may have an increase or change in the discomfort for 36-48 hours after your treatment. Apply ice and continue with any pain medicine you have been prescribed.  Do not do anything strenuous today. You may shower, but no tub baths or hot tubs today. You may resume your normal activities tomorrow, but do not “overdo it”. Resume normal activities slowly when you are feeling better.  If you experience redness, drainage or swelling at the injection site, or if you develop a fever above 100 degrees, please call [facility] at [phone] or go to the Emergency Room.  Continue to take all routine medicines prescribed by your primary care physician unless otherwise instructed by our staff. Most blood thinners should be started again according to your regularly scheduled dosing. If you have any questions, please give our office a call.    As no general anesthesia was used in today's procedure, you should not experience any side effects related to anesthesia.       If you have a problem specifically related to your procedure, please call our office at [phone].  Problems not related to your procedure should be directed to your primary care physician.

## 2024-11-04 ENCOUNTER — TELEPHONE (OUTPATIENT)
Dept: PAIN MEDICINE | Facility: CLINIC | Age: 59
End: 2024-11-04

## 2024-11-13 ENCOUNTER — TELEPHONE (OUTPATIENT)
Dept: FAMILY MEDICINE CLINIC | Facility: CLINIC | Age: 59
End: 2024-11-13

## 2024-11-13 DIAGNOSIS — M81.0 AGE RELATED OSTEOPOROSIS, UNSPECIFIED PATHOLOGICAL FRACTURE PRESENCE: Primary | ICD-10-CM

## 2024-12-17 ENCOUNTER — APPOINTMENT (OUTPATIENT)
Dept: RADIOLOGY | Facility: AMBULARY SURGERY CENTER | Age: 59
End: 2024-12-17
Attending: ORTHOPAEDIC SURGERY
Payer: COMMERCIAL

## 2024-12-17 ENCOUNTER — OFFICE VISIT (OUTPATIENT)
Dept: OBGYN CLINIC | Facility: CLINIC | Age: 59
End: 2024-12-17
Payer: COMMERCIAL

## 2024-12-17 VITALS — BODY MASS INDEX: 35.87 KG/M2 | HEIGHT: 61 IN | WEIGHT: 190 LBS

## 2024-12-17 DIAGNOSIS — M25.571 PAIN, JOINT, ANKLE AND FOOT, RIGHT: ICD-10-CM

## 2024-12-17 DIAGNOSIS — M25.571 PAIN, JOINT, ANKLE AND FOOT, RIGHT: Primary | ICD-10-CM

## 2024-12-17 DIAGNOSIS — S86.311A PERONEAL TENDON TEAR, RIGHT, INITIAL ENCOUNTER: ICD-10-CM

## 2024-12-17 DIAGNOSIS — Z01.89 ENCOUNTER FOR LOWER EXTREMITY COMPARISON IMAGING STUDY: ICD-10-CM

## 2024-12-17 PROCEDURE — 73600 X-RAY EXAM OF ANKLE: CPT

## 2024-12-17 PROCEDURE — 73610 X-RAY EXAM OF ANKLE: CPT

## 2024-12-17 PROCEDURE — 99203 OFFICE O/P NEW LOW 30 MIN: CPT | Performed by: ORTHOPAEDIC SURGERY

## 2024-12-17 NOTE — PATIENT INSTRUCTIONS
OTC Orthotics  Visit [employer]  Under shop orthotics heading, select high arches   On the left side of screen, select the following filters  Max thickness and women  4. At this point you should have two options remaining, select your desired option     List of Supportive shoe stores:     [employer], [employer], [employer] are good brands but I recommend going to a dedicate shoe store (not [employer] or [employer].) At these types of stores, they have experts that can fit you for shoes appropriate for your foot problem. Shoe choice is essential to solving/improving most types of foot pain.  Even after a surgery, good shoes are necessary to keep the foot as comfortable as possible.    [employer]  [address] [address]  [phone]    [facility]  [address], [place], [place] [address] [phone]    [employer]  [address] [place] [address] [phone]    [employer]   [address]  [phone]    [employer]  [address] [place] [address] [address], [place] [address]  [phone]    [employer]  [address] [place], [place] [id number] [phone]    [facility] [employer] [facility]   [address] [place] [address]  [phone]    [employer]  [address] [place], [place]  [phone]    [facility] [address], [address]  [phone]    [employer] [address], [address], [place] [address] [phone]

## 2024-12-17 NOTE — PROGRESS NOTES
James R Lachman, M.D.  Attending, Orthopaedic Surgery  Foot and Ankle  [facility]        ORTHOPAEDIC FOOT AND ANKLE CLINIC VISIT     Assessment:     Encounter Diagnoses   Name Primary?    Pain, joint, ankle and foot, right Yes    Encounter for lower extremity comparison imaging study     Peroneal tendon tear, right, initial encounter           Plan:   The patient verbalized understanding of exam findings and treatment plan. We engaged in the shared decision-making process and treatment options were discussed at length with the patient. Surgical and conservative management discussed today along with risks and benefits.  1-1.5 yr hx of pain along the peroneal tendons, flexible hindfoot varus deformity   OTC orthotics recommended, provided instructions for purchasing [employer]rex orthotics  MRI R ankle/heel ordered for further evaluation of peroneal tendonitis vs tear  X-rays show no subtalar or CC joint arthritis, previous MRI read indicates mild subtalar or CC joint arthritis, pt instructed to bring disc at next visit  Follow up after completion of MRI scan        History of Present Illness:   Chief Complaint:   Chief Complaint   Patient presents with    Right Ankle - Pain     Had surgery on it about a year ago. Been bothering her ever since then and getting worse.      Hiral Jaeger is a 59 y.o. female who is being seen for right ankle pain.  Pain is localized at lateral ankle with minimal radiating and described as sharp and severe. Pain is worse when standing and walking. Patient denies numbness, tingling or radicular pain. Does history of neuropathy.  Patient does not smoke, does not have diabetes and does not take blood thinners.  Patient denies family history of anesthesia complications and has not had any complications with anesthesia.     Pain/symptom timing:  Worse during the day when active  Pain/symptom context:  Worse with activites and work  Pain/symptom modifying factors:  Rest  makes better, activities make worse  Pain/symptom associated signs/symptoms: none    Prior treatment   NSAIDsYes    Injections No   Bracing/Orthotics Yes   Physical Therapy Yes     Orthopedic Surgical History:   See below    Past Medical, Surgical and Social History:  Past Medical History:  has a past medical history of Anemia, Anxiety, Cataract, Chronic pain disorder, Colon polyp, COVID (2020), CPAP (continuous positive airway pressure) dependence, Depression, Fibromyalgia, Fibromyalgia, primary, Heart valve disease, Hypertension, Lumbar radiculopathy, Medical history unknown, Osteopenia, Ovarian cyst, Partial tear of right Achilles tendon, PONV (postoperative nausea and vomiting), Sickle cell anemia (HCC), Sleep apnea, Sleep apnea, obstructive, Spinal stenosis, Staph infection, Tuberculosis, Use of cane as ambulatory aid, and Varicella.  Problem List: does not have any pertinent problems on file.  Past Surgical History:  has a past surgical history that includes Reduction mammaplasty (Bilateral); Carpal tunnel release (Bilateral);  section; Carpal tunnel release (Bilateral); Gastric bypass; [place] tooth extraction; Elbow surgery; Colonoscopy; pr barkley impltj nstim eltrds plate/paddle edrl (Left, 08/15/2022); Spinal cord stimulator implant (); Bunionectomy (Right, 2022); Achilles tendon surgery (Right, 2022); pr arthrocentesis aspir&/inj major jt/bursa w/o us (Right, 2024); and Bariatric Surgery (Not sure).  Family History: family history includes Asthma in her mother; COPD in her mother; Colon cancer in her father; Deep vein thrombosis in her mother; Diabetes in her father, maternal grandmother, paternal aunt, paternal grandmother, and paternal uncle; Heart disease in her mother; Heart failure in her mother; Leukemia in her maternal uncle; Lung disease in her mother; Miscarriages / Stillbirths in her mother; No Known Problems in her maternal grandfather, paternal grandfather, and  "son; Osteoarthritis in her maternal grandmother; Osteoporosis in her mother; Ovarian cancer in her mother; Venous thrombosis in her father.  Social History:  reports that she has never smoked. She has never used smokeless tobacco. She reports current alcohol use of about 1.0 standard drink of alcohol per week. She reports that she does not use drugs.  Current Medications: has a current medication list which includes the following prescription(s): b-d tb syringe 1cc/27gx1/2\", baclofen, cyanocobalamin, ergocalciferol, escitalopram, linaclotide, magnesium, syringe/needle (disp), vitamin b-12, and denosumab.  Allergies: has no known allergies.     Review of Systems:  General- denies fever/chills  HEENT- denies hearing loss or sore throat  Eyes- denies eye pain or visual disturbances, denies red eyes  Respiratory- denies cough or SOB  Cardio- denies chest pain or palpitations  GI- denies abdominal pain  Endocrine- denies urinary frequency  Urinary- denies pain with urination  Musculoskeletal- Negative except noted above  Skin- denies rashes or wounds  Neurological- denies dizziness or headache  Psychiatric- denies anxiety or difficulty concentrating    Physical Exam:   Ht 5' 1\" (1.549 m)   Wt 86.2 kg (190 lb)   BMI 35.90 kg/m²   General/Constitutional: No apparent distress: well-nourished and well developed.  Eyes: normal ocular motion  Cardio: RRR, Normal S1S2, No m/r/g  Lymphatic: No appreciable lymphadenopathy  Respiratory: Non-labored breathing, CTA b/l no w/c/r  Vascular: No edema, swelling or tenderness, except as noted in detailed exam.  Integumentary: No impressive skin lesions present, except as noted in detailed exam.  Neuro: No ataxia or tremors noted  Psych: Normal mood and affect, oriented to person, place and time. Appropriate affect.  Musculoskeletal: Normal, except as noted in detailed exam and in HPI.    Examination    Right    Gait Normal   Musculoskeletal Tender to palpation at TT and Sub-talar joint "    Skin Normal.      Nails Normal    Range of Motion  20 degrees dorsiflexion, 30 degrees plantarflexion  Subtalar motion: normal    Stability Stable    Muscle Strength 5/5 tibialis anterior  5/5 gastrocnemius-soleus  5/5 posterior tibialis  5/5 peroneal/eversion strength  5/5 EHL  5/5 FHL    Neurologic Normal    Sensation  Intact to light touch throughout sural, saphenous, superficial peroneal, deep peroneal and medial/lateral plantar nerve distributions.  [place] 5.07 filament (10g) testing  deferred.    Cardiovascular Brisk capillary refill < 2 seconds,intact DP and PT pulses    Special Tests None      Imaging Studies:   3 views of the right ankle were taken, reviewed and interpreted independently that demonstrate no subtalar or calcaneocuboid arthritic changes. No fracture. Reviewed by me personally.        James R. Lachman, MD  Foot & Ankle Surgery   Department of Orthopaedic Surgery  [facility]      I personally performed the service.    James R. Lachman, MD

## 2024-12-19 ENCOUNTER — TELEPHONE (OUTPATIENT)
Age: 59
End: 2024-12-19

## 2024-12-19 NOTE — TELEPHONE ENCOUNTER
Caller: Beryl     Doctor: Lachman     Reason for call: Asked what kind of Orthotics was ordered. Information was given

## 2024-12-25 ENCOUNTER — RESULTS FOLLOW-UP (OUTPATIENT)
Dept: OBGYN CLINIC | Facility: CLINIC | Age: 59
End: 2024-12-25

## 2024-12-26 ENCOUNTER — HOSPITAL ENCOUNTER (OUTPATIENT)
Dept: RADIOLOGY | Facility: HOSPITAL | Age: 59
Discharge: HOME | End: 2024-12-26
Payer: COMMERCIAL

## 2024-12-26 DIAGNOSIS — Z96.89 SPINAL CORD STIMULATOR STATUS: ICD-10-CM

## 2024-12-26 DIAGNOSIS — S86.311A PERONEAL TENDON TEAR, RIGHT, INITIAL ENCOUNTER: ICD-10-CM

## 2024-12-26 PROCEDURE — 73721 MRI JNT OF LWR EXTRE W/O DYE: CPT

## 2024-12-30 NOTE — TELEPHONE ENCOUNTER
Caller transferred to nurse What Type Of Note Output Would You Prefer (Optional)?: Standard Output What Is The Reason For Today's Visit?: Full Body Skin Examination with No Concerns What Is The Reason For Today's Visit? (Being Monitored For X): concerning skin lesions on an annual basis How Severe Are Your Spot(S)?: moderate

## 2025-04-08 DIAGNOSIS — E53.8 B12 DEFICIENCY: ICD-10-CM

## 2025-04-08 DIAGNOSIS — G57.01 PIRIFORMIS SYNDROME OF RIGHT SIDE: ICD-10-CM

## 2025-04-08 DIAGNOSIS — E55.9 VITAMIN D DEFICIENCY: ICD-10-CM

## 2025-04-08 DIAGNOSIS — M81.0 AGE RELATED OSTEOPOROSIS, UNSPECIFIED PATHOLOGICAL FRACTURE PRESENCE: ICD-10-CM

## 2025-04-08 DIAGNOSIS — Z98.84 H/O GASTRIC BYPASS: Primary | ICD-10-CM

## 2025-04-09 RX ORDER — MULTIVITAMIN WITH IRON
1 TABLET ORAL DAILY
Qty: 90 TABLET | Refills: 1 | Status: SHIPPED | OUTPATIENT
Start: 2025-04-09

## 2025-04-09 RX ORDER — MULTIVITAMIN WITH IRON
500 TABLET ORAL DAILY
Qty: 90 TABLET | Refills: 1 | Status: SHIPPED | OUTPATIENT
Start: 2025-04-09

## 2025-04-09 RX ORDER — BACLOFEN 20 MG/1
20 TABLET ORAL 2 TIMES DAILY
Qty: 180 TABLET | Refills: 1 | Status: SHIPPED | OUTPATIENT
Start: 2025-04-09

## 2025-04-09 RX ORDER — CYANOCOBALAMIN 1000 UG/ML
1000 INJECTION, SOLUTION INTRAMUSCULAR; SUBCUTANEOUS
Qty: 10 ML | Refills: 1 | Status: SHIPPED | OUTPATIENT
Start: 2025-04-09

## 2025-04-09 RX ORDER — NEEDLES, SAFETY 22GX1 1/2"
NEEDLE, DISPOSABLE MISCELLANEOUS
Qty: 10 EACH | Refills: 2 | Status: SHIPPED | OUTPATIENT
Start: 2025-04-09

## 2025-04-09 RX ORDER — ERGOCALCIFEROL 1.25 MG/1
50000 CAPSULE, LIQUID FILLED ORAL
Qty: 12 CAPSULE | Refills: 0 | Status: SHIPPED | OUTPATIENT
Start: 2025-04-09

## 2025-04-09 NOTE — TELEPHONE ENCOUNTER
Patient last seen 02/2024 left a detailed message she needs to schedule an appointment for refills.

## 2025-04-10 ENCOUNTER — TELEPHONE (OUTPATIENT)
Dept: FAMILY MEDICINE CLINIC | Facility: CLINIC | Age: 60
End: 2025-04-10

## 2025-04-10 NOTE — TELEPHONE ENCOUNTER
PA is needed for patient for Prolia 60mg/ml Syringes.      Visit [email or web address]:    Key: [id number]    PA request scanned in.

## 2025-04-21 ENCOUNTER — HOSPITAL ENCOUNTER (OUTPATIENT)
Dept: ULTRASOUND IMAGING | Facility: HOSPITAL | Age: 60
Discharge: HOME | End: 2025-04-21
Attending: OBSTETRICS & GYNECOLOGY
Payer: COMMERCIAL

## 2025-04-21 DIAGNOSIS — N83.202 LEFT OVARIAN CYST: ICD-10-CM

## 2025-04-21 PROCEDURE — 76830 TRANSVAGINAL US NON-OB: CPT

## 2025-04-21 PROCEDURE — 76856 US EXAM PELVIC COMPLETE: CPT

## 2025-04-25 ENCOUNTER — CLINICAL SUPPORT (OUTPATIENT)
Dept: FAMILY MEDICINE CLINIC | Facility: CLINIC | Age: 60
End: 2025-04-25

## 2025-04-25 DIAGNOSIS — M81.0 AGE RELATED OSTEOPOROSIS, UNSPECIFIED PATHOLOGICAL FRACTURE PRESENCE: Primary | ICD-10-CM

## 2025-04-29 ENCOUNTER — RESULTS FOLLOW-UP (OUTPATIENT)
Dept: OBGYN CLINIC | Facility: CLINIC | Age: 60
End: 2025-04-29

## 2025-07-07 ENCOUNTER — RA CDI HCC (OUTPATIENT)
Dept: OTHER | Facility: HOSPITAL | Age: 60
End: 2025-07-07

## 2025-07-15 ENCOUNTER — TELEPHONE (OUTPATIENT)
Dept: FAMILY MEDICINE CLINIC | Facility: CLINIC | Age: 60
End: 2025-07-15

## 2025-07-15 ENCOUNTER — OFFICE VISIT (OUTPATIENT)
Dept: FAMILY MEDICINE CLINIC | Facility: CLINIC | Age: 60
End: 2025-07-15
Payer: COMMERCIAL

## 2025-07-15 VITALS
SYSTOLIC BLOOD PRESSURE: 136 MMHG | HEART RATE: 88 BPM | WEIGHT: 187 LBS | DIASTOLIC BLOOD PRESSURE: 90 MMHG | OXYGEN SATURATION: 98 % | BODY MASS INDEX: 35.3 KG/M2 | HEIGHT: 61 IN

## 2025-07-15 DIAGNOSIS — Z12.31 SCREENING MAMMOGRAM FOR BREAST CANCER: ICD-10-CM

## 2025-07-15 DIAGNOSIS — E66.01 CLASS 2 SEVERE OBESITY DUE TO EXCESS CALORIES WITH SERIOUS COMORBIDITY AND BODY MASS INDEX (BMI) OF 35.0 TO 35.9 IN ADULT (HCC): ICD-10-CM

## 2025-07-15 DIAGNOSIS — E66.812 CLASS 2 SEVERE OBESITY DUE TO EXCESS CALORIES WITH SERIOUS COMORBIDITY AND BODY MASS INDEX (BMI) OF 35.0 TO 35.9 IN ADULT (HCC): ICD-10-CM

## 2025-07-15 DIAGNOSIS — S39.012A BACK STRAIN, INITIAL ENCOUNTER: Primary | ICD-10-CM

## 2025-07-15 DIAGNOSIS — Z79.899 OTHER LONG TERM (CURRENT) DRUG THERAPY: ICD-10-CM

## 2025-07-15 DIAGNOSIS — Z98.84 H/O GASTRIC BYPASS: ICD-10-CM

## 2025-07-15 DIAGNOSIS — F33.1 MODERATE EPISODE OF RECURRENT MAJOR DEPRESSIVE DISORDER (HCC): ICD-10-CM

## 2025-07-15 DIAGNOSIS — I10 PRIMARY HYPERTENSION: ICD-10-CM

## 2025-07-15 DIAGNOSIS — E61.1 IRON DEFICIENCY: ICD-10-CM

## 2025-07-15 DIAGNOSIS — E55.9 VITAMIN D DEFICIENCY: ICD-10-CM

## 2025-07-15 DIAGNOSIS — E78.2 MIXED HYPERLIPIDEMIA: ICD-10-CM

## 2025-07-15 DIAGNOSIS — E53.8 B12 DEFICIENCY: ICD-10-CM

## 2025-07-15 DIAGNOSIS — G47.33 OSA (OBSTRUCTIVE SLEEP APNEA): ICD-10-CM

## 2025-07-15 DIAGNOSIS — Z12.11 SCREENING FOR MALIGNANT NEOPLASM OF COLON: ICD-10-CM

## 2025-07-15 PROCEDURE — 99214 OFFICE O/P EST MOD 30 MIN: CPT | Performed by: FAMILY MEDICINE

## 2025-07-15 PROCEDURE — G2211 COMPLEX E/M VISIT ADD ON: HCPCS | Performed by: FAMILY MEDICINE

## 2025-07-15 RX ORDER — TIRZEPATIDE 2.5 MG/.5ML
2.5 INJECTION, SOLUTION SUBCUTANEOUS WEEKLY
Qty: 2 ML | Refills: 0 | Status: SHIPPED | OUTPATIENT
Start: 2025-07-15 | End: 2025-08-12

## 2025-07-15 RX ORDER — NABUMETONE 500 MG/1
500 TABLET, FILM COATED ORAL 2 TIMES DAILY
Qty: 14 TABLET | Refills: 0 | Status: SHIPPED | OUTPATIENT
Start: 2025-07-15

## 2025-07-15 NOTE — PROGRESS NOTES
Name: Hiral Jaeger      : 1965      MRN: [id number]  Encounter Provider: Carlos Palacios MD  Encounter Date: 7/15/2025   Encounter department: [facility]    :  Assessment & Plan  Back strain, initial encounter    Orders:  •  nabumetone (RELAFEN) 500 mg tablet; Take 1 tablet (500 mg total) by mouth 2 (two) times a day  •  CBC and differential  •  Comprehensive metabolic panel  •  TSH, 3rd generation with Free T4 reflex  •  UA w Reflex to Microscopic w Reflex to Culture  •  Magnesium    Moderate episode of recurrent major depressive disorder (HCC)  Depression Screening Follow-up Plan: Patient's depression screening was positive with a PHQ-9 score of 11. Patient with underlying depression and was advised to continue current medications as prescribed.    -stable/controlled, continue same medication. Will evaluate again next visit       Orders:  •  sertraline (ZOLOFT) 50 mg tablet; Take 1 tablet (50 mg total) by mouth daily  •  CBC and differential  •  Comprehensive metabolic panel  •  TSH, 3rd generation with Free T4 reflex  •  UA w Reflex to Microscopic w Reflex to Culture  •  Magnesium    Class 2 severe obesity due to excess calories with serious comorbidity and body mass index (BMI) of 35.0 to 35.9 in adult (formerly [facility])  Prior Authorization Clinical Questions for Weight Management Pharmacotherapy    1. Does the patient have a contrainidcation to medication prescribed for weight management?: No  2. Does the patient have a diagnosis of obesity, confirmed by a BMI greater than or equal to 30 kg/m^2?: Yes  3. Does the patient have a BMI of greater than or equal to 27 kg/m^2 with at least one weight-related comorbidity/risk factor/complication (e.g. diabetes, dyslipidemia, coronary artery disease)?: Yes  4. Weight-related co-morbidities/risk factors: dyslipidemia, hypertension, obstructive sleep apnea (NOAH), osteoarthritis, depression  5. WEGOVY CVA Indication: Does patient have established  documented cardiovascular disease (history of a prior heart attack (myocardial infarction), stroke, or symptomatic peripheral arterial disease (PAD)?: N/A  6. ZEPBOUND NOAH Indication: Does patient have documented NOAH diagnosed via sleep study (insurance will require copy of sleep study results for approval)?: Yes  7. Has the patient been on a weight loss regimen of low-calorie diet, increased physical activity, and lifestyle modifications for a minimum of 6 months?: Yes  8. Has the patient completed a comprehensive weight loss program (ie, Weight Watchers, Noom, Bariatrics, other aaron on phone)? If so, what?: No  9. Does the patient have a history of type 2 diabetes?: No  10. Has the member tried and failed other weight loss medication within the past 12 months?: No  11. Will the member use requested medication in combination with another GLP agonist or weight loss drug?: No  12. Is the medication a controlled substance?: No     Baseline weight (in pounds): 187 lbs  Current weight (in pounds): 187 lbs  Weight loss percentage: 0%         Orders:  •  tirzepatide (Zepbound) 2.5 mg/0.5 mL auto-injector; Inject 0.5 mL (2.5 mg total) under the skin once a week for 28 days  •  CBC and differential  •  Comprehensive metabolic panel  •  TSH, 3rd generation with Free T4 reflex  •  UA w Reflex to Microscopic w Reflex to Culture  •  Magnesium    NOAH (obstructive sleep apnea)    Orders:  •  CBC and differential  •  Comprehensive metabolic panel  •  TSH, 3rd generation with Free T4 reflex  •  UA w Reflex to Microscopic w Reflex to Culture  •  Magnesium    H/O gastric bypass    Orders:  •  CBC and differential  •  Comprehensive metabolic panel  •  TSH, 3rd generation with Free T4 reflex  •  UA w Reflex to Microscopic w Reflex to Culture  •  Magnesium    Primary hypertension    Orders:  •  CBC and differential  •  Comprehensive metabolic panel  •  TSH, 3rd generation with Free T4 reflex  •  UA w Reflex to Microscopic w Reflex to  Culture  •  Magnesium    Screening mammogram for breast cancer    Orders:  •  Mammo screening bilateral w 3d and cad; Future  •  CBC and differential  •  Comprehensive metabolic panel  •  TSH, 3rd generation with Free T4 reflex  •  UA w Reflex to Microscopic w Reflex to Culture  •  Magnesium    Screening for malignant neoplasm of colon    Orders:  •  Ambulatory referral to Gastroenterology; Future  •  CBC and differential  •  Comprehensive metabolic panel  •  TSH, 3rd generation with Free T4 reflex  •  UA w Reflex to Microscopic w Reflex to Culture  •  Magnesium    Other long term (current) drug therapy    Orders:  •  Hemoglobin A1C  •  CBC and differential  •  Comprehensive metabolic panel  •  TSH, 3rd generation with Free T4 reflex  •  UA w Reflex to Microscopic w Reflex to Culture  •  Magnesium    Mixed hyperlipidemia    Orders:  •  CBC and differential  •  Comprehensive metabolic panel  •  Lipid Panel with Direct LDL reflex  •  TSH, 3rd generation with Free T4 reflex  •  UA w Reflex to Microscopic w Reflex to Culture  •  Magnesium    Iron deficiency    Orders:  •  CBC and differential  •  Comprehensive metabolic panel  •  Iron Panel (Includes Ferritin, Iron Sat%, Iron, and TIBC)  •  TSH, 3rd generation with Free T4 reflex  •  UA w Reflex to Microscopic w Reflex to Culture  •  Magnesium    Vitamin D deficiency    Orders:  •  CBC and differential  •  Comprehensive metabolic panel  •  Vitamin D 25 hydroxy  •  TSH, 3rd generation with Free T4 reflex  •  UA w Reflex to Microscopic w Reflex to Culture  •  Magnesium    B12 deficiency    Orders:  •  CBC and differential  •  Comprehensive metabolic panel  •  Vitamin B12  •  TSH, 3rd generation with Free T4 reflex  •  UA w Reflex to Microscopic w Reflex to Culture  •  Magnesium      Assessment & Plan  1. Back pain.  - Reports back pain exacerbated by recent fall.  - Currently taking baclofen twice daily and Motrin.  - Advised to increase baclofen to three times daily for  a week.  - Prescription for a stronger pain medication provided.    2. Anxiety.  - Discussed anxiety management and previous use of Lexapro.  - Will start sertraline at 50 mg, taking half a pill for a few days, then increasing to a full pill, and eventually to two pills (100 mg) daily.  - Prescription for sertraline sent to pharmacy.  - Counseling on the importance of therapy and potential insurance coverage issues.    3. Weight loss.  - Has obstructive sleep apnea, which may qualify her for weight loss medication coverage.  - Prescription for Zepbound injections provided, starting at 2.5 mg once a week.  - Instructions given on how to administer the injection.  - Dosage to be increased monthly up to 15 mg as needed, with follow-up after the third injection.    4. Health maintenance.  - Repeat DEXA scan scheduled for 05/05/2026.  - Blood tests to be conducted.  - Mammogram ordered.  - Vitamin C requested and provided.    Follow-up  - Follow-up appointment scheduled in 2 months to monitor response to sertraline and review blood test results.           History of Present Illness     History of Present Illness  The patient presents for evaluation of back pain, anxiety, and weight loss.    She reports experiencing back pain that radiates down her spine when she tilts her head upwards. This discomfort began after a fall caused by her dog two days ago, during which she landed on a ceramic tile floor. She has been managing the pain with baclofen, taken twice daily, and Motrin. She does not experience any sedative effects from the baclofen.    She is seeking assistance with weight loss. She has previously tried Wegovy but found it ineffective. She also mentions having sleep apnea.    She reports experiencing anxiety and was on Lexapro a long time ago. She is currently considering trying sertraline (Zoloft) for her anxiety.     Review of Systems   Constitutional:  Negative for fever and unexpected weight change.   HENT:   "Negative for nosebleeds and trouble swallowing.    Eyes:  Negative for visual disturbance.   Respiratory:  Negative for chest tightness and shortness of breath.    Cardiovascular:  Negative for chest pain, palpitations and leg swelling.   Gastrointestinal:  Negative for abdominal pain, constipation, diarrhea and nausea.   Endocrine: Negative for cold intolerance.   Genitourinary:  Negative for dysuria and urgency.   Musculoskeletal:  Negative for joint swelling and myalgias.   Skin:  Negative for rash.   Neurological:  Negative for tremors, seizures and syncope.   Hematological:  Does not bruise/bleed easily.   Psychiatric/Behavioral:  Negative for hallucinations and suicidal ideas.      Objective   /90   Pulse 88   Ht 5' 1\" (1.549 m)   Wt 84.8 kg (187 lb)   SpO2 98%   BMI 35.33 kg/m²     Physical Exam  - Musculoskeletal: Patient reports back pain radiating down the spine when extending the neck  Physical Exam  Vitals and nursing note reviewed.   Constitutional:       Appearance: She is well-developed. She is obese.   HENT:      Head: Normocephalic and atraumatic.      Right Ear: External ear normal.      Left Ear: External ear normal.      Nose: Nose normal.     Eyes:      Conjunctiva/sclera: Conjunctivae normal.      Pupils: Pupils are equal, round, and reactive to light.       Cardiovascular:      Rate and Rhythm: Normal rate and regular rhythm.      Heart sounds: Normal heart sounds. No murmur heard.  Pulmonary:      Effort: Pulmonary effort is normal.      Breath sounds: Normal breath sounds. No wheezing.   Abdominal:      General: Bowel sounds are normal.      Palpations: Abdomen is soft.     Musculoskeletal:         General: Tenderness present.      Cervical back: Normal range of motion and neck supple.   Lymphadenopathy:      Cervical: No cervical adenopathy.     Skin:     General: Skin is warm and dry.      Capillary Refill: Capillary refill takes less than 2 seconds.     Neurological:      " Mental Status: She is alert and oriented to person, place, and time.     Psychiatric:         Behavior: Behavior normal.         Thought Content: Thought content normal.         Judgment: Judgment normal.

## 2025-07-16 NOTE — TELEPHONE ENCOUNTER
Please ask provider to sign their most recent office visit note. The pa team cannot process a prior authorization for the requested medication without a signed office visit note. Thank you.

## 2025-07-17 DIAGNOSIS — Z00.6 ENCOUNTER FOR EXAMINATION FOR NORMAL COMPARISON OR CONTROL IN CLINICAL RESEARCH PROGRAM: ICD-10-CM

## 2025-07-17 NOTE — TELEPHONE ENCOUNTER
PA for     tirzepatide (Zepbound) 2.5 mg/0.5 mL auto-injector   SUBMITTED to [employer]    via    [x]CMM-KEY: [id number]   []Surescripts-Case ID #    []Availity-Auth ID #  NDC #    []Faxed to plan   []Other website    []Phone call Case ID #      [x]PA sent as URGENT    All office notes, labs and other pertaining documents and studies sent. Clinical questions answered. Awaiting determination from insurance company.     Turnaround time for your insurance to make a decision on your Prior Authorization can take 7-21 business days.

## 2025-07-18 NOTE — TELEPHONE ENCOUNTER
PA for     tirzepatide (Zepbound) 2.5 mg/0.5 mL auto-injector   EXCLUDED from plan       Reason:(Screenshot if applicable)        Message sent to office clinical pool Yes

## 2025-07-21 ENCOUNTER — APPOINTMENT (OUTPATIENT)
Dept: LAB | Facility: CLINIC | Age: 60
End: 2025-07-21

## 2025-07-21 DIAGNOSIS — Z00.6 ENCOUNTER FOR EXAMINATION FOR NORMAL COMPARISON OR CONTROL IN CLINICAL RESEARCH PROGRAM: ICD-10-CM

## 2025-07-21 PROCEDURE — 36415 COLL VENOUS BLD VENIPUNCTURE: CPT

## 2025-07-29 ENCOUNTER — OFFICE VISIT (OUTPATIENT)
Dept: FAMILY MEDICINE CLINIC | Facility: CLINIC | Age: 60
End: 2025-07-29
Payer: COMMERCIAL

## 2025-07-29 VITALS
HEIGHT: 61 IN | SYSTOLIC BLOOD PRESSURE: 122 MMHG | OXYGEN SATURATION: 99 % | HEART RATE: 78 BPM | RESPIRATION RATE: 16 BRPM | WEIGHT: 187 LBS | DIASTOLIC BLOOD PRESSURE: 80 MMHG | BODY MASS INDEX: 35.3 KG/M2

## 2025-07-29 DIAGNOSIS — L23.7 POISON IVY: Primary | ICD-10-CM

## 2025-07-29 LAB
APOB+LDLR+PCSK9 GENE MUT ANL BLD/T: NOT DETECTED
BRCA1+BRCA2 DEL+DUP + FULL MUT ANL BLD/T: NOT DETECTED
MLH1+MSH2+MSH6+PMS2 GN DEL+DUP+FUL M: NOT DETECTED

## 2025-07-29 PROCEDURE — G2211 COMPLEX E/M VISIT ADD ON: HCPCS | Performed by: FAMILY MEDICINE

## 2025-07-29 PROCEDURE — 99213 OFFICE O/P EST LOW 20 MIN: CPT | Performed by: FAMILY MEDICINE

## 2025-07-29 RX ORDER — PREDNISONE 10 MG/1
TABLET ORAL
Qty: 26 TABLET | Refills: 0 | Status: SHIPPED | OUTPATIENT
Start: 2025-07-29

## 2025-08-04 ENCOUNTER — APPOINTMENT (OUTPATIENT)
Dept: LAB | Facility: CLINIC | Age: 60
End: 2025-08-04
Payer: COMMERCIAL

## 2025-08-04 LAB
25(OH)D3 SERPL-MCNC: 21.4 NG/ML (ref 30–100)
ALBUMIN SERPL BCG-MCNC: 4 G/DL (ref 3.5–5)
ALP SERPL-CCNC: 38 U/L (ref 34–104)
ALT SERPL W P-5'-P-CCNC: 22 U/L (ref 7–52)
ANION GAP SERPL CALCULATED.3IONS-SCNC: 7 MMOL/L (ref 4–13)
AST SERPL W P-5'-P-CCNC: 21 U/L (ref 13–39)
BASOPHILS # BLD AUTO: 0.05 THOUSANDS/ÂΜL (ref 0–0.1)
BASOPHILS NFR BLD AUTO: 1 % (ref 0–1)
BILIRUB SERPL-MCNC: 0.41 MG/DL (ref 0.2–1)
BUN SERPL-MCNC: 18 MG/DL (ref 5–25)
CALCIUM SERPL-MCNC: 8.4 MG/DL (ref 8.4–10.2)
CHLORIDE SERPL-SCNC: 104 MMOL/L (ref 96–108)
CHOLEST SERPL-MCNC: 176 MG/DL (ref ?–200)
CO2 SERPL-SCNC: 31 MMOL/L (ref 21–32)
CREAT SERPL-MCNC: 0.72 MG/DL (ref 0.6–1.3)
EOSINOPHIL # BLD AUTO: 0.13 THOUSAND/ÂΜL (ref 0–0.61)
EOSINOPHIL NFR BLD AUTO: 1 % (ref 0–6)
ERYTHROCYTE [DISTWIDTH] IN BLOOD BY AUTOMATED COUNT: 11.8 % (ref 11.6–15.1)
EST. AVERAGE GLUCOSE BLD GHB EST-MCNC: 126 MG/DL
FERRITIN SERPL-MCNC: 21 NG/ML (ref 30–307)
GFR SERPL CREATININE-BSD FRML MDRD: 91 ML/MIN/1.73SQ M
GLUCOSE P FAST SERPL-MCNC: 91 MG/DL (ref 65–99)
HBA1C MFR BLD: 6 %
HCT VFR BLD AUTO: 39.7 % (ref 34.8–46.1)
HDLC SERPL-MCNC: 79 MG/DL
HGB BLD-MCNC: 13.3 G/DL (ref 11.5–15.4)
IMM GRANULOCYTES # BLD AUTO: 0.05 THOUSAND/UL (ref 0–0.2)
IMM GRANULOCYTES NFR BLD AUTO: 1 % (ref 0–2)
IRON SATN MFR SERPL: 23 % (ref 15–50)
IRON SERPL-MCNC: 91 UG/DL (ref 50–212)
LDLC SERPL CALC-MCNC: 82 MG/DL (ref 0–100)
LYMPHOCYTES # BLD AUTO: 2.65 THOUSANDS/ÂΜL (ref 0.6–4.47)
LYMPHOCYTES NFR BLD AUTO: 27 % (ref 14–44)
MAGNESIUM SERPL-MCNC: 2.2 MG/DL (ref 1.9–2.7)
MCH RBC QN AUTO: 31.2 PG (ref 26.8–34.3)
MCHC RBC AUTO-ENTMCNC: 33.5 G/DL (ref 31.4–37.4)
MCV RBC AUTO: 93 FL (ref 82–98)
MONOCYTES # BLD AUTO: 0.83 THOUSAND/ÂΜL (ref 0.17–1.22)
MONOCYTES NFR BLD AUTO: 8 % (ref 4–12)
NEUTROPHILS # BLD AUTO: 6.2 THOUSANDS/ÂΜL (ref 1.85–7.62)
NEUTS SEG NFR BLD AUTO: 62 % (ref 43–75)
NRBC BLD AUTO-RTO: 0 /100 WBCS
PLATELET # BLD AUTO: 258 THOUSANDS/UL (ref 149–390)
PMV BLD AUTO: 10.3 FL (ref 8.9–12.7)
POTASSIUM SERPL-SCNC: 3.7 MMOL/L (ref 3.5–5.3)
PROT SERPL-MCNC: 6.8 G/DL (ref 6.4–8.4)
RBC # BLD AUTO: 4.26 MILLION/UL (ref 3.81–5.12)
SODIUM SERPL-SCNC: 142 MMOL/L (ref 135–147)
TIBC SERPL-MCNC: 403.2 UG/DL (ref 250–450)
TRANSFERRIN SERPL-MCNC: 288 MG/DL (ref 203–362)
TRIGL SERPL-MCNC: 74 MG/DL (ref ?–150)
TSH SERPL DL<=0.05 MIU/L-ACNC: 2.14 UIU/ML (ref 0.45–4.5)
UIBC SERPL-MCNC: 312 UG/DL (ref 155–355)
VIT B12 SERPL-MCNC: 498 PG/ML (ref 180–914)
WBC # BLD AUTO: 9.91 THOUSAND/UL (ref 4.31–10.16)

## (undated) DEVICE — INTENDED FOR TISSUE SEPARATION, AND OTHER PROCEDURES THAT REQUIRE A SHARP SURGICAL BLADE TO PUNCTURE OR CUT.: Brand: BARD-PARKER SAFETY BLADES SIZE 10, STERILE

## (undated) DEVICE — PAD CAST 6 IN COTTON NON STERILE

## (undated) DEVICE — GAUZE SPONGES,16 PLY: Brand: CURITY

## (undated) DEVICE — PASSING ELEVATOR ACCESSORY TOOL: Brand: NEVRO

## (undated) DEVICE — PENCIL ELECTROSURG E-Z CLEAN -0035H

## (undated) DEVICE — CRADLE EXTREMITY UNIVERSAL CONTOURED

## (undated) DEVICE — TOOL 14MH30 LEGEND 14CM 3MM: Brand: MIDAS REX ™

## (undated) DEVICE — SUT VICRYL 4-0 PS-2 27 IN J426H

## (undated) DEVICE — SUT VICRYL 0 CT-1 CR/8 27 IN JJ41G

## (undated) DEVICE — CHLORAPREP HI-LITE 26ML ORANGE

## (undated) DEVICE — SUT PROLENE 2-0 FS 18 IN 8685H

## (undated) DEVICE — STOCKINETTE 2P PREROLLD 6X60

## (undated) DEVICE — CAST PADDING 4 IN SYNTHETIC NON-STRL

## (undated) DEVICE — IPG2000 TEMPLATE KIT: Brand: NEVRO

## (undated) DEVICE — HEMOSTATIC MATRIX SURGIFLO 8ML W/THROMBIN

## (undated) DEVICE — ELECTRODE BLADE MOD E-Z CLEAN 4IN -0014AM

## (undated) DEVICE — SYRINGE 3ML LL

## (undated) DEVICE — STRETCH BANDAGE: Brand: CURITY

## (undated) DEVICE — TIBURON SPLIT SHEET: Brand: CONVERTORS

## (undated) DEVICE — INTENDED FOR TISSUE SEPARATION, AND OTHER PROCEDURES THAT REQUIRE A SHARP SURGICAL BLADE TO PUNCTURE OR CUT.: Brand: BARD-PARKER ® SAFETYLOCK CARBON RIB-BACK BLADES

## (undated) DEVICE — ACE WRAP 4 IN UNSTERILE

## (undated) DEVICE — RADIOLOGY STERILE LABELS: Brand: CENTURION

## (undated) DEVICE — CUFF TOURNIQUET 30 X 4 IN QUICK CONNECT DISP 1BLA

## (undated) DEVICE — LIGHT HANDLE COVER SLEEVE DISP BLUE STELLAR

## (undated) DEVICE — BETHLEHEM UNIVERSAL  MIONR EXT: Brand: CARDINAL HEALTH

## (undated) DEVICE — DRAPE C-ARMOUR

## (undated) DEVICE — ADHESIVE SKIN HIGH VISCOSITY EXOFIN 1ML

## (undated) DEVICE — SUT PROLENE 3-0 PS-2 18 IN 8687H

## (undated) DEVICE — SPONGE SCRUB 4 PCT CHLORHEXIDINE

## (undated) DEVICE — NEEDLE 25G X 1 1/2

## (undated) DEVICE — TELFA ADHESIVE ISLAND DRESSING: Brand: TELFA

## (undated) DEVICE — Device

## (undated) DEVICE — PLASTIC ADHESIVE BANDAGE: Brand: CURITY

## (undated) DEVICE — TOWEL SET X-RAY

## (undated) DEVICE — CURITY NON-ADHERENT STRIPS: Brand: CURITY

## (undated) DEVICE — Device: Brand: PORTEX

## (undated) DEVICE — NEEDLE BLUNT 18 G X 1 1/2IN

## (undated) DEVICE — NEEDLE SPINAL 22G X 3.5 IN PLST HUB

## (undated) DEVICE — TUNNELING TOOL KIT, 35CM: Brand: NEVRO®

## (undated) DEVICE — SUT SILK 0 SH 30 IN K834H

## (undated) DEVICE — DRESSING MEPILEX AG BORDER 4 X 4 IN

## (undated) DEVICE — SCD SEQUENTIAL COMPRESSION COMFORT SLEEVE MEDIUM KNEE LENGTH: Brand: KENDALL SCD

## (undated) DEVICE — GLOVE SRG BIOGEL 7.5

## (undated) DEVICE — SINGLE PORT MANIFOLD: Brand: NEPTUNE 2

## (undated) DEVICE — DISPOSABLE OR TOWEL: Brand: CARDINAL HEALTH

## (undated) DEVICE — INTENDED FOR TISSUE SEPARATION, AND OTHER PROCEDURES THAT REQUIRE A SHARP SURGICAL BLADE TO PUNCTURE OR CUT.: Brand: BARD-PARKER SAFETY BLADES SIZE 15, STERILE

## (undated) DEVICE — SUT STRATAFIX SPIRAL MONOCRYL PLUS 4-0 PS-2 45CM SXMP1B118

## (undated) DEVICE — BETHLEHEM UNIVERSAL SPINE, KIT: Brand: CARDINAL HEALTH

## (undated) DEVICE — GLOVE SRG LF STRL BGL SKNSNS 7.5 PF

## (undated) DEVICE — DRILL: Brand: SONICFUSION

## (undated) DEVICE — ANTIBACTERIAL VIOLET BRAIDED (POLYGLACTIN 910), SYNTHETIC ABSORBABLE SUTURE: Brand: COATED VICRYL

## (undated) DEVICE — SMALL TEAR CROSS CUT RASP (11.0 X 5.0MM)

## (undated) DEVICE — SYRINGE 10ML LL

## (undated) DEVICE — CHLORAPREP APPLICATOR TINTED 10.5ML ONE-STEP

## (undated) DEVICE — CHARGER 2500 KIT: Brand: OMNIA

## (undated) DEVICE — SPONGE STICK WITH PVP-I: Brand: KENDALL

## (undated) DEVICE — BUCKET PLASTER DISPOSABLE

## (undated) DEVICE — SYRINGE 5ML LL

## (undated) DEVICE — SPLINT 4 IN X 15 FT DYNACAST S

## (undated) DEVICE — POV-IOD SOLUTION 4OZ BT

## (undated) DEVICE — WEBRIL 6 IN UNSTERILE

## (undated) DEVICE — CAST PADDING 4 IN UNSTERILE

## (undated) DEVICE — STERILE POLYISOPRENE POWDER-FREE SURGICAL GLOVES: Brand: PROTEXIS

## (undated) DEVICE — SUT VICRYL 4-0 SH 27 IN J415H

## (undated) DEVICE — BULB SYRINGE,IRRIGATION WITH PROTECTIVE CAP: Brand: DOVER

## (undated) DEVICE — PATIENT REMOTE 2500 KIT: Brand: OMNIA

## (undated) DEVICE — GLOVE INDICATOR PI UNDERGLOVE SZ 7.5 BLUE

## (undated) DEVICE — DRAPE C-ARM X-RAY

## (undated) DEVICE — ACE WRAP 6 IN UNSTERILE